# Patient Record
Sex: MALE | Race: WHITE | HISPANIC OR LATINO | URBAN - METROPOLITAN AREA
[De-identification: names, ages, dates, MRNs, and addresses within clinical notes are randomized per-mention and may not be internally consistent; named-entity substitution may affect disease eponyms.]

---

## 2019-12-18 ENCOUNTER — EMERGENCY (EMERGENCY)
Facility: HOSPITAL | Age: 34
LOS: 1 days | Discharge: HOME | End: 2019-12-18
Attending: INTERNAL MEDICINE | Admitting: INTERNAL MEDICINE
Payer: SELF-PAY

## 2019-12-18 VITALS
HEART RATE: 89 BPM | SYSTOLIC BLOOD PRESSURE: 139 MMHG | RESPIRATION RATE: 17 BRPM | DIASTOLIC BLOOD PRESSURE: 86 MMHG | TEMPERATURE: 99 F | OXYGEN SATURATION: 96 % | WEIGHT: 130.07 LBS | HEIGHT: 62 IN

## 2019-12-18 PROCEDURE — 99283 EMERGENCY DEPT VISIT LOW MDM: CPT

## 2019-12-18 RX ORDER — POLYMYXIN B SULF/TRIMETHOPRIM 10000-1/ML
1 DROPS OPHTHALMIC (EYE) ONCE
Refills: 0 | Status: COMPLETED | OUTPATIENT
Start: 2019-12-18 | End: 2019-12-18

## 2019-12-18 RX ADMIN — Medication 1 DROP(S): at 18:26

## 2019-12-18 NOTE — ED ADULT NURSE NOTE - CHIEF COMPLAINT QUOTE
Pt c/o right eye pain/redness, possible foreign body, vision test bilateral eyes 20/40, left eye 20/40, right eye 20/100

## 2019-12-18 NOTE — ED PROVIDER NOTE - ATTENDING CONTRIBUTION TO CARE
Pt c/o right eye pain/redness, possible foreign body, vision test bilateral eyes 20/40, left eye 20/40, right eye 20/100    pain, eye, possible foreign body  + Flourescin uptake R eye  dx corneal abrasion  Dr. García:  I have reviewed and discussed with the PA/ resident the case specifics, including the history, physical assessment, evaluation, conclusion, laboratory results, and medical plan. I agree with the contents, and conclusions. I have personally examined, and interviewed the patient.

## 2019-12-18 NOTE — ED PROVIDER NOTE - PATIENT PORTAL LINK FT
You can access the FollowMyHealth Patient Portal offered by [facility] by registering at the following website: [email or web address]. By joining [facility]’s FollowMyHealth portal, you will also be able to view your health information using other applications (apps) compatible with our system.

## 2019-12-18 NOTE — ED ADULT NURSE NOTE - OBJECTIVE STATEMENT
Pt arrived to the ER c/o right eye pain.  ID #[id number] used for assessment. 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 Pt denies any blurriness. Pt c/o pain in right eye 8/10. Pt presents with right eye redness.

## 2019-12-18 NOTE — ED PROVIDER NOTE - OBJECTIVE STATEMENT
34 year old male, no PMHx, presents to the ED complaining of right eye pain and foreign body sensation. Patient states he was taking off a vent cover, and when he did insulation fell into his right eye. The accident occurred around 0230, he washed it out but it continues to hurt, increased tearing, redness, and he feels like there is a foreign body present. He denies other trauma/injury or complaints.

## 2019-12-18 NOTE — ED ADULT TRIAGE NOTE - CHIEF COMPLAINT QUOTE
Pt c/o right eye pain/redness, possible foreign body Pt c/o right eye pain/redness, possible foreign body, vision test bilateral eyes 20/40, left eye 20/40, right eye 20/100

## 2019-12-18 NOTE — ED PROVIDER NOTE - NSFOLLOWUPINSTRUCTIONS_ED_ALL_ED_FT
Apply clean, warm, moist compresses to eye  Keep hands clean, do not rub the eye  Use Polytrim drops 1 drop in each eye every 4 hours for ten days  Take Motrin 600mg every 6-8 hours with food as needed  Follow up with ophthalmology: [phone]  Follow up with your PMD 2-3 days  Return to the ER for worsening    Corneal Abrasion    The cornea is the clear covering at the front and center of the eye. This very thin tissue is made up of many layers. If a scratch or injury causes the corneal epithelium to come off, it is called a corneal abrasion. Symptoms include eye pain, redness, tearing, difficulty keeping eye open, and light sensitivity. Do not drive or operate machinery if your eye is patched.  Antibiotic eye drops may be prescribed to reduce the risk of infection.  It is important to follow up with an ophthalmologist (eye doctor) to ensure proper healing.    SEEK IMMEDIATE MEDICAL CARE IF YOU HAVE ANY OF THE FOLLOWING SYMPTOMS: discharge from eyes, changes in vision, fever, or swelling.        Aplique compresas limpias, cálidas y húmedas en los ojos.  Mantenga las marco limpias, no se frote los ojos.  Use gotas de Polytrim 1 gota en cada mack cada 4 horas jesus hilary días  [place] Motrin 600mg cada 6-8 horas con alimentos según sea necesario  Seguimiento con oftalmología: [phone]  April un seguimiento con gomes PMD 2-3 días  Regrese a la latonya de emergencias para empeorar    Abrasión corneal    La córnea es la cubierta transparente en la parte frontal y central del mack. Tawnya tejido muy calabrese está formado por muchas capas. Si un rasguño o lesión causa que el epitelio corneal se desprenda, se llama abrasión corneal. Los síntomas incluyen dolor ocular, enrojecimiento, lagrimeo, dificultad para mantener el mack abierto y sensibilidad a la edson. No conduzca ni maneje maquinaria si gomes mack está parchado. Se pueden recetar gotas antibióticas para los ojos para reducir el riesgo de infección. Es importante hacer un seguimiento con un oftalmólogo (oftalmólogo) para garantizar oliver curación adecuada.    BUSQUE ATENCIÓN MÉDICA INMEDIATA SI TIENE ALGUNO DE LOS SÍNTOMAS SIGUIENTES: secreción de los ojos, cambios en la visión, fiebre o hinchazón.

## 2019-12-28 DIAGNOSIS — H57.10 OCULAR PAIN, UNSPECIFIED EYE: ICD-10-CM

## 2020-05-23 ENCOUNTER — EMERGENCY (EMERGENCY)
Facility: HOSPITAL | Age: 35
LOS: 1 days | Discharge: HOME | End: 2020-05-23
Attending: EMERGENCY MEDICINE | Admitting: EMERGENCY MEDICINE
Payer: SELF-PAY

## 2020-05-23 VITALS — WEIGHT: 160.06 LBS | HEIGHT: 65 IN

## 2020-05-23 DIAGNOSIS — F10.129 ALCOHOL ABUSE WITH INTOXICATION, UNSPECIFIED: ICD-10-CM

## 2020-05-23 PROCEDURE — 99284 EMERGENCY DEPT VISIT MOD MDM: CPT

## 2020-05-23 RX ORDER — HALOPERIDOL DECANOATE 100 MG/ML
5 INJECTION INTRAMUSCULAR ONCE
Refills: 0 | Status: COMPLETED | OUTPATIENT
Start: 2020-05-23 | End: 2020-05-23

## 2020-05-23 RX ORDER — SODIUM CHLORIDE 9 MG/ML
1000 INJECTION INTRAMUSCULAR; INTRAVENOUS; SUBCUTANEOUS ONCE
Refills: 0 | Status: COMPLETED | OUTPATIENT
Start: 2020-05-23 | End: 2020-05-23

## 2020-05-23 RX ADMIN — SODIUM CHLORIDE 1000 MILLILITER(S): 9 INJECTION INTRAMUSCULAR; INTRAVENOUS; SUBCUTANEOUS at 22:53

## 2020-05-23 RX ADMIN — Medication 1 MILLIGRAM(S): at 20:55

## 2020-05-23 RX ADMIN — HALOPERIDOL DECANOATE 5 MILLIGRAM(S): 100 INJECTION INTRAMUSCULAR at 20:40

## 2020-05-23 RX ADMIN — Medication 2 MILLIGRAM(S): at 20:40

## 2020-05-23 NOTE — ED ADULT NURSE REASSESSMENT NOTE - NS ED NURSE REASSESS COMMENT FT1
patient too aggressive to take vital signs, security called to assist patient into room and keep a safe environment. medicated with ativan 2 and haldol 5 IM per verbal order SHOAIB NGUYEN

## 2020-05-23 NOTE — ED PROVIDER NOTE - OBJECTIVE STATEMENT
34 y/o 33 y/o M with unknown PMH was BIB EMS for public intoxication, patient found on the streets. Patient agitated on arrival. Unable to obtained further information

## 2020-05-23 NOTE — ED PROVIDER NOTE - PATIENT PORTAL LINK FT
You can access the FollowMyHealth Patient Portal offered by [facility] by registering at the following website: [email or web address]. By joining [facility]’s FollowMyHealth portal, you will also be able to view your health information using other applications (apps) compatible with our system.

## 2020-05-23 NOTE — ED PROVIDER NOTE - PHYSICAL EXAMINATION
PHYSICAL EXAM:   · CONSTITUTIONAL: Well appearing, well nourished, and in no apparent distress.  · ENMT: Airway patent, Nasal mucosa clear. Mouth with normal mucosa. Throat has no vesicles, no oropharyngeal exudates and uvula is midline.  · EYES: Clear bilaterally, pupils equal, round and reactive to light.  · CARDIAC: Normal rate, regular rhythm.  Heart sounds S1, S2.  No murmurs, rubs or gallops.  · RESPIRATORY: Breath sounds clear and equal bilaterally.  · GASTROINTESTINAL: Abdomen soft, non-tender, no guarding.  · MUSCULOSKELETAL: no injury  · NEUROLOGICAL: responds to verbal stimuli, moves all four PHYSICAL EXAM:   · CONSTITUTIONAL: Well appearing, well nourished, and in no apparent distress. +AOB  · ENMT: Airway patent, Nasal mucosa clear. Mouth with normal mucosa. Throat has no vesicles, no oropharyngeal exudates and uvula is midline.  · EYES: Clear bilaterally, pupils equal, round and reactive to light.  · CARDIAC: Normal rate, regular rhythm.  Heart sounds S1, S2.  No murmurs, rubs or gallops.  · RESPIRATORY: Breath sounds clear and equal bilaterally.  · GASTROINTESTINAL: Abdomen soft, non-tender, no guarding.  · MUSCULOSKELETAL: no injury  · NEUROLOGICAL: responds to verbal stimuli, moves all four

## 2020-05-23 NOTE — ED ADULT NURSE NOTE - NSIMPLEMENTINTERV_GEN_ALL_ED
Implemented All Fall Risk Interventions:  [place] to call system. Call bell, personal items and telephone within reach. Instruct patient to call for assistance. Room bathroom lighting operational. Non-slip footwear when patient is off stretcher. Physically safe environment: no spills, clutter or unnecessary equipment. Stretcher in lowest position, wheels locked, appropriate side rails in place. Provide visual cue, wrist band, yellow gown, etc. Monitor gait and stability. Monitor for mental status changes and reorient to person, place, and time. Review medications for side effects contributing to fall risk. Reinforce activity limits and safety measures with patient and family.

## 2020-05-23 NOTE — ED PROVIDER NOTE - CLINICAL SUMMARY MEDICAL DECISION MAKING FREE TEXT BOX
33 y/o M with unknown PMH was BIB EMS for public intoxication, patient found on the streets. Patient agitated on arrival. Unable to obtained further information. PE as noted above.  No signs of trauma. patient agitated and aggressive towards staff, ativan and haldol given for sedation. he was placed on the monitor. Will continue to observe and monitor until sober

## 2020-05-23 NOTE — ED ADULT NURSE REASSESSMENT NOTE - NS ED NURSE REASSESS COMMENT FT1
Pt is in bed sleeping at this time. No agitation noted. Respirations easy and unlabored. Vital signs stable. Bed alarm in place including all fall precautions. Will continue to monitor.

## 2020-05-23 NOTE — ED ADULT NURSE NOTE - OBJECTIVE STATEMENT
Pt brought in by ems for alcohol intox. On arrival pt was agitated and combative. No injuries noted. Pt stated he drank liquor but denies any drug use.

## 2020-05-23 NOTE — ED ADULT NURSE NOTE - PAIN RATING/NUMBER SCALE (0-10): ACTIVITY
Left message for patient she has to come in and sign the form and we give her a copy and a bracelet.     
Pt wants to obtain a sign stating \"do not resuscitate\". She wants something saying this so if something happens the medical staff are aware of her wishes.     
0

## 2020-05-24 VITALS
OXYGEN SATURATION: 99 % | DIASTOLIC BLOOD PRESSURE: 76 MMHG | HEART RATE: 95 BPM | SYSTOLIC BLOOD PRESSURE: 125 MMHG | RESPIRATION RATE: 17 BRPM | TEMPERATURE: 98 F

## 2022-01-01 ENCOUNTER — APPOINTMENT (OUTPATIENT)
Dept: ULTRASOUND IMAGING | Facility: HOSPITAL | Age: 37
End: 2022-01-01

## 2022-01-01 ENCOUNTER — APPOINTMENT (OUTPATIENT)
Dept: FAMILY MEDICINE | Facility: HOSPITAL | Age: 37
End: 2022-01-01

## 2022-01-01 ENCOUNTER — EMERGENCY (EMERGENCY)
Facility: HOSPITAL | Age: 37
LOS: 1 days | Discharge: LEFT BEFORE DISCHARGE | End: 2022-01-01
Admitting: EMERGENCY MEDICINE

## 2022-01-01 VITALS
HEART RATE: 88 BPM | SYSTOLIC BLOOD PRESSURE: 133 MMHG | WEIGHT: 131.4 LBS | TEMPERATURE: 99 F | DIASTOLIC BLOOD PRESSURE: 90 MMHG | HEIGHT: 65 IN | OXYGEN SATURATION: 100 % | RESPIRATION RATE: 18 BRPM

## 2022-01-01 PROCEDURE — L9991: CPT

## 2022-09-06 NOTE — ED ADULT TRIAGE NOTE - CHIEF COMPLAINT QUOTE
Pt c/o dry mouth with tremors/blurry vision started 2 days ago Pt c/o dry mouth with tremors/blurry vision started 2 days ago, refused to do vision test

## 2022-09-24 PROBLEM — Z00.00 ENCOUNTER FOR PREVENTIVE HEALTH EXAMINATION: Status: ACTIVE | Noted: 2022-01-01

## 2023-01-01 ENCOUNTER — INPATIENT (INPATIENT)
Facility: HOSPITAL | Age: 38
LOS: 9 days | DRG: 643 | End: 2023-01-13
Attending: STUDENT IN AN ORGANIZED HEALTH CARE EDUCATION/TRAINING PROGRAM | Admitting: INTERNAL MEDICINE
Payer: MEDICAID

## 2023-01-01 VITALS
HEART RATE: 131 BPM | WEIGHT: 113.98 LBS | TEMPERATURE: 99 F | OXYGEN SATURATION: 97 % | HEIGHT: 63 IN | RESPIRATION RATE: 18 BRPM | DIASTOLIC BLOOD PRESSURE: 97 MMHG | SYSTOLIC BLOOD PRESSURE: 137 MMHG

## 2023-01-01 VITALS — RESPIRATION RATE: 18 BRPM | HEART RATE: 150 BPM | OXYGEN SATURATION: 92 %

## 2023-01-01 DIAGNOSIS — C22.9 MALIGNANT NEOPLASM OF LIVER, NOT SPECIFIED AS PRIMARY OR SECONDARY: ICD-10-CM

## 2023-01-01 DIAGNOSIS — K76.82 HEPATIC ENCEPHALOPATHY: ICD-10-CM

## 2023-01-01 DIAGNOSIS — E16.2 HYPOGLYCEMIA, UNSPECIFIED: ICD-10-CM

## 2023-01-01 DIAGNOSIS — U07.1 COVID-19: ICD-10-CM

## 2023-01-01 DIAGNOSIS — Z71.89 OTHER SPECIFIED COUNSELING: ICD-10-CM

## 2023-01-01 LAB
A1C WITH ESTIMATED AVERAGE GLUCOSE RESULT: 4.7 % — SIGNIFICANT CHANGE UP (ref 4–5.6)
ALBUMIN SERPL ELPH-MCNC: 2.1 G/DL — LOW (ref 3.3–5)
ALBUMIN SERPL ELPH-MCNC: 2.2 G/DL — LOW (ref 3.3–5)
ALBUMIN SERPL ELPH-MCNC: 2.2 G/DL — LOW (ref 3.3–5)
ALBUMIN SERPL ELPH-MCNC: 2.3 G/DL — LOW (ref 3.3–5)
ALBUMIN SERPL ELPH-MCNC: 2.3 G/DL — LOW (ref 3.3–5)
ALP SERPL-CCNC: 199 U/L — HIGH (ref 40–120)
ALP SERPL-CCNC: 206 U/L — HIGH (ref 40–120)
ALP SERPL-CCNC: 207 U/L — HIGH (ref 40–120)
ALP SERPL-CCNC: 245 U/L — HIGH (ref 40–120)
ALP SERPL-CCNC: 245 U/L — HIGH (ref 40–120)
ALT FLD-CCNC: 123 U/L — HIGH (ref 10–45)
ALT FLD-CCNC: 126 U/L — HIGH (ref 10–45)
ALT FLD-CCNC: 78 U/L — HIGH (ref 10–45)
ALT FLD-CCNC: 79 U/L — HIGH (ref 10–45)
ALT FLD-CCNC: 84 U/L — HIGH (ref 10–45)
AMMONIA BLD-MCNC: 136 UMOL/L — HIGH (ref 11–55)
AMMONIA BLD-MCNC: 147 UMOL/L — HIGH (ref 11–55)
ANION GAP SERPL CALC-SCNC: 10 MMOL/L — SIGNIFICANT CHANGE UP (ref 5–17)
ANION GAP SERPL CALC-SCNC: 10 MMOL/L — SIGNIFICANT CHANGE UP (ref 5–17)
ANION GAP SERPL CALC-SCNC: 11 MMOL/L — SIGNIFICANT CHANGE UP (ref 5–17)
ANION GAP SERPL CALC-SCNC: 12 MMOL/L — SIGNIFICANT CHANGE UP (ref 5–17)
ANION GAP SERPL CALC-SCNC: 13 MMOL/L — SIGNIFICANT CHANGE UP (ref 5–17)
ANION GAP SERPL CALC-SCNC: 8 MMOL/L — SIGNIFICANT CHANGE UP (ref 5–17)
ANION GAP SERPL CALC-SCNC: 8 MMOL/L — SIGNIFICANT CHANGE UP (ref 5–17)
ANION GAP SERPL CALC-SCNC: 9 MMOL/L — SIGNIFICANT CHANGE UP (ref 5–17)
ANION GAP SERPL CALC-SCNC: 9 MMOL/L — SIGNIFICANT CHANGE UP (ref 5–17)
ANISOCYTOSIS BLD QL: SLIGHT — SIGNIFICANT CHANGE UP
APPEARANCE UR: CLEAR — SIGNIFICANT CHANGE UP
APTT BLD: 40.4 SEC — HIGH (ref 27.5–35.5)
AST SERPL-CCNC: 106 U/L — HIGH (ref 10–40)
AST SERPL-CCNC: 78 U/L — HIGH (ref 10–40)
AST SERPL-CCNC: 94 U/L — HIGH (ref 10–40)
AST SERPL-CCNC: 96 U/L — HIGH (ref 10–40)
AST SERPL-CCNC: 99 U/L — HIGH (ref 10–40)
B PERT IGG+IGM PNL SER: ABNORMAL
BACTERIA # UR AUTO: ABNORMAL /HPF
BASOPHILS # BLD AUTO: 0 K/UL — SIGNIFICANT CHANGE UP (ref 0–0.2)
BASOPHILS # BLD AUTO: 0.01 K/UL — SIGNIFICANT CHANGE UP (ref 0–0.2)
BASOPHILS # BLD AUTO: 0.01 K/UL — SIGNIFICANT CHANGE UP (ref 0–0.2)
BASOPHILS # BLD AUTO: 0.03 K/UL — SIGNIFICANT CHANGE UP (ref 0–0.2)
BASOPHILS # BLD AUTO: 0.03 K/UL — SIGNIFICANT CHANGE UP (ref 0–0.2)
BASOPHILS # BLD AUTO: 0.04 K/UL — SIGNIFICANT CHANGE UP (ref 0–0.2)
BASOPHILS NFR BLD AUTO: 0 % — SIGNIFICANT CHANGE UP (ref 0–2)
BASOPHILS NFR BLD AUTO: 0.1 % — SIGNIFICANT CHANGE UP (ref 0–2)
BASOPHILS NFR BLD AUTO: 0.1 % — SIGNIFICANT CHANGE UP (ref 0–2)
BASOPHILS NFR BLD AUTO: 0.2 % — SIGNIFICANT CHANGE UP (ref 0–2)
BILIRUB SERPL-MCNC: 2 MG/DL — HIGH (ref 0.2–1.2)
BILIRUB SERPL-MCNC: 2.1 MG/DL — HIGH (ref 0.2–1.2)
BILIRUB SERPL-MCNC: 2.3 MG/DL — HIGH (ref 0.2–1.2)
BILIRUB SERPL-MCNC: 2.5 MG/DL — HIGH (ref 0.2–1.2)
BILIRUB SERPL-MCNC: 2.5 MG/DL — HIGH (ref 0.2–1.2)
BILIRUB SERPL-MCNC: 3.3 MG/DL — HIGH (ref 0.2–1.2)
BILIRUB UR-MCNC: NEGATIVE — SIGNIFICANT CHANGE UP
BLD GP AB SCN SERPL QL: SIGNIFICANT CHANGE UP
BUN SERPL-MCNC: 10 MG/DL — SIGNIFICANT CHANGE UP (ref 7–23)
BUN SERPL-MCNC: 11 MG/DL — SIGNIFICANT CHANGE UP (ref 7–23)
BUN SERPL-MCNC: 11 MG/DL — SIGNIFICANT CHANGE UP (ref 7–23)
BUN SERPL-MCNC: 13 MG/DL — SIGNIFICANT CHANGE UP (ref 7–23)
BUN SERPL-MCNC: 15 MG/DL — SIGNIFICANT CHANGE UP (ref 7–23)
BUN SERPL-MCNC: 15 MG/DL — SIGNIFICANT CHANGE UP (ref 7–23)
BUN SERPL-MCNC: 8 MG/DL — SIGNIFICANT CHANGE UP (ref 7–23)
CALCIUM SERPL-MCNC: 8.2 MG/DL — LOW (ref 8.4–10.5)
CALCIUM SERPL-MCNC: 8.3 MG/DL — LOW (ref 8.4–10.5)
CALCIUM SERPL-MCNC: 8.4 MG/DL — SIGNIFICANT CHANGE UP (ref 8.4–10.5)
CALCIUM SERPL-MCNC: 8.5 MG/DL — SIGNIFICANT CHANGE UP (ref 8.4–10.5)
CALCIUM SERPL-MCNC: 8.6 MG/DL — SIGNIFICANT CHANGE UP (ref 8.4–10.5)
CALCIUM SERPL-MCNC: 8.6 MG/DL — SIGNIFICANT CHANGE UP (ref 8.4–10.5)
CALCIUM SERPL-MCNC: 8.8 MG/DL — SIGNIFICANT CHANGE UP (ref 8.4–10.5)
CALCIUM SERPL-MCNC: 8.8 MG/DL — SIGNIFICANT CHANGE UP (ref 8.4–10.5)
CALCIUM SERPL-MCNC: 8.9 MG/DL — SIGNIFICANT CHANGE UP (ref 8.4–10.5)
CHLORIDE SERPL-SCNC: 101 MMOL/L — SIGNIFICANT CHANGE UP (ref 96–108)
CHLORIDE SERPL-SCNC: 101 MMOL/L — SIGNIFICANT CHANGE UP (ref 96–108)
CHLORIDE SERPL-SCNC: 104 MMOL/L — SIGNIFICANT CHANGE UP (ref 96–108)
CHLORIDE SERPL-SCNC: 95 MMOL/L — LOW (ref 96–108)
CHLORIDE SERPL-SCNC: 98 MMOL/L — SIGNIFICANT CHANGE UP (ref 96–108)
CHLORIDE SERPL-SCNC: 99 MMOL/L — SIGNIFICANT CHANGE UP (ref 96–108)
CO2 SERPL-SCNC: 20 MMOL/L — LOW (ref 22–31)
CO2 SERPL-SCNC: 22 MMOL/L — SIGNIFICANT CHANGE UP (ref 22–31)
CO2 SERPL-SCNC: 23 MMOL/L — SIGNIFICANT CHANGE UP (ref 22–31)
CO2 SERPL-SCNC: 25 MMOL/L — SIGNIFICANT CHANGE UP (ref 22–31)
COLOR FLD: YELLOW — SIGNIFICANT CHANGE UP
COLOR SPEC: YELLOW — SIGNIFICANT CHANGE UP
COMMENT - FLUIDS: SIGNIFICANT CHANGE UP
CORTICOSTEROID BINDING GLOBULIN RESULT: 1.4 MG/DL — LOW
CORTIS F/TOTAL MFR SERPL: <4 % — SIGNIFICANT CHANGE UP
CORTIS SERPL-MCNC: <1 UG/DL — LOW
CORTISOL, FREE RESULT: <0.04 UG/DL — LOW
CREAT SERPL-MCNC: 0.4 MG/DL — LOW (ref 0.5–1.3)
CREAT SERPL-MCNC: 0.4 MG/DL — LOW (ref 0.5–1.3)
CREAT SERPL-MCNC: 0.49 MG/DL — LOW (ref 0.5–1.3)
CREAT SERPL-MCNC: 0.51 MG/DL — SIGNIFICANT CHANGE UP (ref 0.5–1.3)
CREAT SERPL-MCNC: 0.54 MG/DL — SIGNIFICANT CHANGE UP (ref 0.5–1.3)
CREAT SERPL-MCNC: 0.55 MG/DL — SIGNIFICANT CHANGE UP (ref 0.5–1.3)
CREAT SERPL-MCNC: 0.56 MG/DL — SIGNIFICANT CHANGE UP (ref 0.5–1.3)
CREAT SERPL-MCNC: 0.57 MG/DL — SIGNIFICANT CHANGE UP (ref 0.5–1.3)
CREAT SERPL-MCNC: 0.6 MG/DL — SIGNIFICANT CHANGE UP (ref 0.5–1.3)
CREAT SERPL-MCNC: 0.76 MG/DL — SIGNIFICANT CHANGE UP (ref 0.5–1.3)
CULTURE RESULTS: NO GROWTH — SIGNIFICANT CHANGE UP
CULTURE RESULTS: SIGNIFICANT CHANGE UP
CULTURE RESULTS: SIGNIFICANT CHANGE UP
DACRYOCYTES BLD QL SMEAR: SLIGHT — SIGNIFICANT CHANGE UP
DIFF PNL FLD: ABNORMAL
EGFR: 118 ML/MIN/1.73M2 — SIGNIFICANT CHANGE UP
EGFR: 127 ML/MIN/1.73M2 — SIGNIFICANT CHANGE UP
EGFR: 128 ML/MIN/1.73M2 — SIGNIFICANT CHANGE UP
EGFR: 129 ML/MIN/1.73M2 — SIGNIFICANT CHANGE UP
EGFR: 130 ML/MIN/1.73M2 — SIGNIFICANT CHANGE UP
EGFR: 130 ML/MIN/1.73M2 — SIGNIFICANT CHANGE UP
EGFR: 133 ML/MIN/1.73M2 — SIGNIFICANT CHANGE UP
EGFR: 134 ML/MIN/1.73M2 — SIGNIFICANT CHANGE UP
EGFR: 143 ML/MIN/1.73M2 — SIGNIFICANT CHANGE UP
EGFR: 144 ML/MIN/1.73M2 — SIGNIFICANT CHANGE UP
ELLIPTOCYTES BLD QL SMEAR: SLIGHT — SIGNIFICANT CHANGE UP
EOSINOPHIL # BLD AUTO: 0 K/UL — SIGNIFICANT CHANGE UP (ref 0–0.5)
EOSINOPHIL # BLD AUTO: 0.01 K/UL — SIGNIFICANT CHANGE UP (ref 0–0.5)
EOSINOPHIL # BLD AUTO: 0.03 K/UL — SIGNIFICANT CHANGE UP (ref 0–0.5)
EOSINOPHIL NFR BLD AUTO: 0 % — SIGNIFICANT CHANGE UP (ref 0–6)
EOSINOPHIL NFR BLD AUTO: 0.1 % — SIGNIFICANT CHANGE UP (ref 0–6)
EOSINOPHIL NFR BLD AUTO: 0.2 % — SIGNIFICANT CHANGE UP (ref 0–6)
EPI CELLS # UR: SIGNIFICANT CHANGE UP
ESTIMATED AVERAGE GLUCOSE: 88 MG/DL — SIGNIFICANT CHANGE UP (ref 68–114)
FLUID INTAKE SUBSTANCE CLASS: SIGNIFICANT CHANGE UP
GLUCOSE BLDC GLUCOMTR-MCNC: 100 MG/DL — HIGH (ref 70–99)
GLUCOSE BLDC GLUCOMTR-MCNC: 100 MG/DL — HIGH (ref 70–99)
GLUCOSE BLDC GLUCOMTR-MCNC: 101 MG/DL — HIGH (ref 70–99)
GLUCOSE BLDC GLUCOMTR-MCNC: 101 MG/DL — HIGH (ref 70–99)
GLUCOSE BLDC GLUCOMTR-MCNC: 102 MG/DL — HIGH (ref 70–99)
GLUCOSE BLDC GLUCOMTR-MCNC: 106 MG/DL — HIGH (ref 70–99)
GLUCOSE BLDC GLUCOMTR-MCNC: 106 MG/DL — HIGH (ref 70–99)
GLUCOSE BLDC GLUCOMTR-MCNC: 108 MG/DL — HIGH (ref 70–99)
GLUCOSE BLDC GLUCOMTR-MCNC: 109 MG/DL — HIGH (ref 70–99)
GLUCOSE BLDC GLUCOMTR-MCNC: 111 MG/DL — HIGH (ref 70–99)
GLUCOSE BLDC GLUCOMTR-MCNC: 112 MG/DL — HIGH (ref 70–99)
GLUCOSE BLDC GLUCOMTR-MCNC: 113 MG/DL — HIGH (ref 70–99)
GLUCOSE BLDC GLUCOMTR-MCNC: 115 MG/DL — HIGH (ref 70–99)
GLUCOSE BLDC GLUCOMTR-MCNC: 117 MG/DL — HIGH (ref 70–99)
GLUCOSE BLDC GLUCOMTR-MCNC: 118 MG/DL — HIGH (ref 70–99)
GLUCOSE BLDC GLUCOMTR-MCNC: 119 MG/DL — HIGH (ref 70–99)
GLUCOSE BLDC GLUCOMTR-MCNC: 119 MG/DL — HIGH (ref 70–99)
GLUCOSE BLDC GLUCOMTR-MCNC: 121 MG/DL — HIGH (ref 70–99)
GLUCOSE BLDC GLUCOMTR-MCNC: 129 MG/DL — HIGH (ref 70–99)
GLUCOSE BLDC GLUCOMTR-MCNC: 134 MG/DL — HIGH (ref 70–99)
GLUCOSE BLDC GLUCOMTR-MCNC: 135 MG/DL — HIGH (ref 70–99)
GLUCOSE BLDC GLUCOMTR-MCNC: 138 MG/DL — HIGH (ref 70–99)
GLUCOSE BLDC GLUCOMTR-MCNC: 143 MG/DL — HIGH (ref 70–99)
GLUCOSE BLDC GLUCOMTR-MCNC: 160 MG/DL — HIGH (ref 70–99)
GLUCOSE BLDC GLUCOMTR-MCNC: 188 MG/DL — HIGH (ref 70–99)
GLUCOSE BLDC GLUCOMTR-MCNC: 287 MG/DL — HIGH (ref 70–99)
GLUCOSE BLDC GLUCOMTR-MCNC: 304 MG/DL — HIGH (ref 70–99)
GLUCOSE BLDC GLUCOMTR-MCNC: 38 MG/DL — CRITICAL LOW (ref 70–99)
GLUCOSE BLDC GLUCOMTR-MCNC: 39 MG/DL — CRITICAL LOW (ref 70–99)
GLUCOSE BLDC GLUCOMTR-MCNC: 43 MG/DL — CRITICAL LOW (ref 70–99)
GLUCOSE BLDC GLUCOMTR-MCNC: 45 MG/DL — CRITICAL LOW (ref 70–99)
GLUCOSE BLDC GLUCOMTR-MCNC: 45 MG/DL — CRITICAL LOW (ref 70–99)
GLUCOSE BLDC GLUCOMTR-MCNC: 56 MG/DL — LOW (ref 70–99)
GLUCOSE BLDC GLUCOMTR-MCNC: 61 MG/DL — LOW (ref 70–99)
GLUCOSE BLDC GLUCOMTR-MCNC: 62 MG/DL — LOW (ref 70–99)
GLUCOSE BLDC GLUCOMTR-MCNC: 64 MG/DL — LOW (ref 70–99)
GLUCOSE BLDC GLUCOMTR-MCNC: 65 MG/DL — LOW (ref 70–99)
GLUCOSE BLDC GLUCOMTR-MCNC: 67 MG/DL — LOW (ref 70–99)
GLUCOSE BLDC GLUCOMTR-MCNC: 68 MG/DL — LOW (ref 70–99)
GLUCOSE BLDC GLUCOMTR-MCNC: 69 MG/DL — LOW (ref 70–99)
GLUCOSE BLDC GLUCOMTR-MCNC: 77 MG/DL — SIGNIFICANT CHANGE UP (ref 70–99)
GLUCOSE BLDC GLUCOMTR-MCNC: 80 MG/DL — SIGNIFICANT CHANGE UP (ref 70–99)
GLUCOSE BLDC GLUCOMTR-MCNC: 83 MG/DL — SIGNIFICANT CHANGE UP (ref 70–99)
GLUCOSE BLDC GLUCOMTR-MCNC: 84 MG/DL — SIGNIFICANT CHANGE UP (ref 70–99)
GLUCOSE BLDC GLUCOMTR-MCNC: 89 MG/DL — SIGNIFICANT CHANGE UP (ref 70–99)
GLUCOSE BLDC GLUCOMTR-MCNC: 89 MG/DL — SIGNIFICANT CHANGE UP (ref 70–99)
GLUCOSE BLDC GLUCOMTR-MCNC: 90 MG/DL — SIGNIFICANT CHANGE UP (ref 70–99)
GLUCOSE BLDC GLUCOMTR-MCNC: 91 MG/DL — SIGNIFICANT CHANGE UP (ref 70–99)
GLUCOSE BLDC GLUCOMTR-MCNC: 91 MG/DL — SIGNIFICANT CHANGE UP (ref 70–99)
GLUCOSE BLDC GLUCOMTR-MCNC: 92 MG/DL — SIGNIFICANT CHANGE UP (ref 70–99)
GLUCOSE BLDC GLUCOMTR-MCNC: 93 MG/DL — SIGNIFICANT CHANGE UP (ref 70–99)
GLUCOSE BLDC GLUCOMTR-MCNC: 93 MG/DL — SIGNIFICANT CHANGE UP (ref 70–99)
GLUCOSE BLDC GLUCOMTR-MCNC: 94 MG/DL — SIGNIFICANT CHANGE UP (ref 70–99)
GLUCOSE SERPL-MCNC: 104 MG/DL — HIGH (ref 70–99)
GLUCOSE SERPL-MCNC: 109 MG/DL — HIGH (ref 70–99)
GLUCOSE SERPL-MCNC: 117 MG/DL — HIGH (ref 70–99)
GLUCOSE SERPL-MCNC: 118 MG/DL — HIGH (ref 70–99)
GLUCOSE SERPL-MCNC: 118 MG/DL — HIGH (ref 70–99)
GLUCOSE SERPL-MCNC: 175 MG/DL — HIGH (ref 70–99)
GLUCOSE SERPL-MCNC: 241 MG/DL — HIGH (ref 70–99)
GLUCOSE SERPL-MCNC: 250 MG/DL — HIGH (ref 70–99)
GLUCOSE SERPL-MCNC: 86 MG/DL — SIGNIFICANT CHANGE UP (ref 70–99)
GLUCOSE UR QL: 50 MG/DL
HCT VFR BLD CALC: 38.7 % — LOW (ref 39–50)
HCT VFR BLD CALC: 39 % — SIGNIFICANT CHANGE UP (ref 39–50)
HCT VFR BLD CALC: 39.6 % — SIGNIFICANT CHANGE UP (ref 39–50)
HCT VFR BLD CALC: 39.8 % — SIGNIFICANT CHANGE UP (ref 39–50)
HCT VFR BLD CALC: 40.7 % — SIGNIFICANT CHANGE UP (ref 39–50)
HCT VFR BLD CALC: 41.2 % — SIGNIFICANT CHANGE UP (ref 39–50)
HCT VFR BLD CALC: 41.3 % — SIGNIFICANT CHANGE UP (ref 39–50)
HCT VFR BLD CALC: 42.4 % — SIGNIFICANT CHANGE UP (ref 39–50)
HGB BLD-MCNC: 12.6 G/DL — LOW (ref 13–17)
HGB BLD-MCNC: 12.8 G/DL — LOW (ref 13–17)
HGB BLD-MCNC: 13.1 G/DL — SIGNIFICANT CHANGE UP (ref 13–17)
HGB BLD-MCNC: 13.1 G/DL — SIGNIFICANT CHANGE UP (ref 13–17)
HGB BLD-MCNC: 13.2 G/DL — SIGNIFICANT CHANGE UP (ref 13–17)
HGB BLD-MCNC: 13.5 G/DL — SIGNIFICANT CHANGE UP (ref 13–17)
HGB BLD-MCNC: 13.6 G/DL — SIGNIFICANT CHANGE UP (ref 13–17)
HGB BLD-MCNC: 13.8 G/DL — SIGNIFICANT CHANGE UP (ref 13–17)
IMM GRANULOCYTES NFR BLD AUTO: 0.6 % — SIGNIFICANT CHANGE UP (ref 0–0.9)
IMM GRANULOCYTES NFR BLD AUTO: 0.6 % — SIGNIFICANT CHANGE UP (ref 0–0.9)
IMM GRANULOCYTES NFR BLD AUTO: 0.7 % — SIGNIFICANT CHANGE UP (ref 0–0.9)
IMM GRANULOCYTES NFR BLD AUTO: 0.8 % — SIGNIFICANT CHANGE UP (ref 0–0.9)
IMM GRANULOCYTES NFR BLD AUTO: 1.1 % — HIGH (ref 0–0.9)
INR BLD: 2.44 RATIO — HIGH (ref 0.88–1.16)
INR BLD: 2.49 RATIO — HIGH (ref 0.88–1.16)
INR BLD: 2.71 RATIO — HIGH (ref 0.88–1.16)
KETONES UR-MCNC: ABNORMAL
LACTATE SERPL-SCNC: 2.5 MMOL/L — HIGH (ref 0.7–2)
LACTATE SERPL-SCNC: 4.5 MMOL/L — CRITICAL HIGH (ref 0.7–2)
LACTATE SERPL-SCNC: 4.5 MMOL/L — CRITICAL HIGH (ref 0.7–2)
LACTATE SERPL-SCNC: 5 MMOL/L — CRITICAL HIGH (ref 0.7–2)
LACTATE SERPL-SCNC: 5.1 MMOL/L — CRITICAL HIGH (ref 0.7–2)
LACTATE SERPL-SCNC: 5.1 MMOL/L — CRITICAL HIGH (ref 0.7–2)
LACTATE SERPL-SCNC: 5.6 MMOL/L — CRITICAL HIGH (ref 0.7–2)
LEUKOCYTE ESTERASE UR-ACNC: ABNORMAL
LG PLATELETS BLD QL AUTO: SLIGHT — SIGNIFICANT CHANGE UP
LIDOCAIN IGE QN: 23 U/L — LOW (ref 73–393)
LYMPHOCYTES # BLD AUTO: 0.51 K/UL — LOW (ref 1–3.3)
LYMPHOCYTES # BLD AUTO: 0.94 K/UL — LOW (ref 1–3.3)
LYMPHOCYTES # BLD AUTO: 1.1 K/UL — SIGNIFICANT CHANGE UP (ref 1–3.3)
LYMPHOCYTES # BLD AUTO: 1.5 K/UL — SIGNIFICANT CHANGE UP (ref 1–3.3)
LYMPHOCYTES # BLD AUTO: 1.63 K/UL — SIGNIFICANT CHANGE UP (ref 1–3.3)
LYMPHOCYTES # BLD AUTO: 1.69 K/UL — SIGNIFICANT CHANGE UP (ref 1–3.3)
LYMPHOCYTES # BLD AUTO: 11.6 % — LOW (ref 13–44)
LYMPHOCYTES # BLD AUTO: 4 % — LOW (ref 13–44)
LYMPHOCYTES # BLD AUTO: 6.7 % — LOW (ref 13–44)
LYMPHOCYTES # BLD AUTO: 7.9 % — LOW (ref 13–44)
LYMPHOCYTES # BLD AUTO: 8.9 % — LOW (ref 13–44)
LYMPHOCYTES # BLD AUTO: 9.1 % — LOW (ref 13–44)
LYMPHOCYTES # FLD: 14 % — SIGNIFICANT CHANGE UP
MAGNESIUM SERPL-MCNC: 1.7 MG/DL — SIGNIFICANT CHANGE UP (ref 1.6–2.6)
MANUAL SMEAR VERIFICATION: SIGNIFICANT CHANGE UP
MCHC RBC-ENTMCNC: 29.1 PG — SIGNIFICANT CHANGE UP (ref 27–34)
MCHC RBC-ENTMCNC: 29.1 PG — SIGNIFICANT CHANGE UP (ref 27–34)
MCHC RBC-ENTMCNC: 29.2 PG — SIGNIFICANT CHANGE UP (ref 27–34)
MCHC RBC-ENTMCNC: 29.2 PG — SIGNIFICANT CHANGE UP (ref 27–34)
MCHC RBC-ENTMCNC: 29.3 PG — SIGNIFICANT CHANGE UP (ref 27–34)
MCHC RBC-ENTMCNC: 29.8 PG — SIGNIFICANT CHANGE UP (ref 27–34)
MCHC RBC-ENTMCNC: 30 PG — SIGNIFICANT CHANGE UP (ref 27–34)
MCHC RBC-ENTMCNC: 30.2 PG — SIGNIFICANT CHANGE UP (ref 27–34)
MCHC RBC-ENTMCNC: 32.2 GM/DL — SIGNIFICANT CHANGE UP (ref 32–36)
MCHC RBC-ENTMCNC: 32.3 GM/DL — SIGNIFICANT CHANGE UP (ref 32–36)
MCHC RBC-ENTMCNC: 32.5 GM/DL — SIGNIFICANT CHANGE UP (ref 32–36)
MCHC RBC-ENTMCNC: 32.7 GM/DL — SIGNIFICANT CHANGE UP (ref 32–36)
MCHC RBC-ENTMCNC: 33 GM/DL — SIGNIFICANT CHANGE UP (ref 32–36)
MCHC RBC-ENTMCNC: 33.1 GM/DL — SIGNIFICANT CHANGE UP (ref 32–36)
MCHC RBC-ENTMCNC: 33.1 GM/DL — SIGNIFICANT CHANGE UP (ref 32–36)
MCHC RBC-ENTMCNC: 33.2 GM/DL — SIGNIFICANT CHANGE UP (ref 32–36)
MCV RBC AUTO: 88.2 FL — SIGNIFICANT CHANGE UP (ref 80–100)
MCV RBC AUTO: 88.8 FL — SIGNIFICANT CHANGE UP (ref 80–100)
MCV RBC AUTO: 89 FL — SIGNIFICANT CHANGE UP (ref 80–100)
MCV RBC AUTO: 90.1 FL — SIGNIFICANT CHANGE UP (ref 80–100)
MCV RBC AUTO: 90.2 FL — SIGNIFICANT CHANGE UP (ref 80–100)
MCV RBC AUTO: 90.8 FL — SIGNIFICANT CHANGE UP (ref 80–100)
MCV RBC AUTO: 91.1 FL — SIGNIFICANT CHANGE UP (ref 80–100)
MCV RBC AUTO: 92.2 FL — SIGNIFICANT CHANGE UP (ref 80–100)
MELD SCORE WITH DIALYSIS: 35 POINTS — SIGNIFICANT CHANGE UP
MELD SCORE WITHOUT DIALYSIS: 23 POINTS — SIGNIFICANT CHANGE UP
MESOTHL CELL # FLD: 9 % — SIGNIFICANT CHANGE UP
MONOCYTES # BLD AUTO: 0.51 K/UL — SIGNIFICANT CHANGE UP (ref 0–0.9)
MONOCYTES # BLD AUTO: 0.51 K/UL — SIGNIFICANT CHANGE UP (ref 0–0.9)
MONOCYTES # BLD AUTO: 0.56 K/UL — SIGNIFICANT CHANGE UP (ref 0–0.9)
MONOCYTES # BLD AUTO: 0.97 K/UL — HIGH (ref 0–0.9)
MONOCYTES # BLD AUTO: 1.03 K/UL — HIGH (ref 0–0.9)
MONOCYTES # BLD AUTO: 1.34 K/UL — HIGH (ref 0–0.9)
MONOCYTES NFR BLD AUTO: 3.7 % — SIGNIFICANT CHANGE UP (ref 2–14)
MONOCYTES NFR BLD AUTO: 4 % — SIGNIFICANT CHANGE UP (ref 2–14)
MONOCYTES NFR BLD AUTO: 4 % — SIGNIFICANT CHANGE UP (ref 2–14)
MONOCYTES NFR BLD AUTO: 6.1 % — SIGNIFICANT CHANGE UP (ref 2–14)
MONOCYTES NFR BLD AUTO: 6.9 % — SIGNIFICANT CHANGE UP (ref 2–14)
MONOCYTES NFR BLD AUTO: 7.2 % — SIGNIFICANT CHANGE UP (ref 2–14)
MONOS+MACROS # FLD: 29 % — SIGNIFICANT CHANGE UP
NEUTROPHILS # BLD AUTO: 11.32 K/UL — HIGH (ref 1.8–7.4)
NEUTROPHILS # BLD AUTO: 11.68 K/UL — HIGH (ref 1.8–7.4)
NEUTROPHILS # BLD AUTO: 12.15 K/UL — HIGH (ref 1.8–7.4)
NEUTROPHILS # BLD AUTO: 12.38 K/UL — HIGH (ref 1.8–7.4)
NEUTROPHILS # BLD AUTO: 14.05 K/UL — HIGH (ref 1.8–7.4)
NEUTROPHILS # BLD AUTO: 15.33 K/UL — HIGH (ref 1.8–7.4)
NEUTROPHILS NFR BLD AUTO: 80.6 % — HIGH (ref 43–77)
NEUTROPHILS NFR BLD AUTO: 82.5 % — HIGH (ref 43–77)
NEUTROPHILS NFR BLD AUTO: 83.7 % — HIGH (ref 43–77)
NEUTROPHILS NFR BLD AUTO: 87.4 % — HIGH (ref 43–77)
NEUTROPHILS NFR BLD AUTO: 88.8 % — HIGH (ref 43–77)
NEUTROPHILS NFR BLD AUTO: 91 % — HIGH (ref 43–77)
NEUTROPHILS-BODY FLUID: 45 % — SIGNIFICANT CHANGE UP
NITRITE UR-MCNC: NEGATIVE — SIGNIFICANT CHANGE UP
NRBC # BLD: 0 /100 WBCS — SIGNIFICANT CHANGE UP (ref 0–0)
NRBC # BLD: 0 /100 — SIGNIFICANT CHANGE UP (ref 0–0)
OTHER CELLS FLD MANUAL: 3 % — SIGNIFICANT CHANGE UP
PH UR: 6.5 — SIGNIFICANT CHANGE UP (ref 5–8)
PHOSPHATE SERPL-MCNC: 2.9 MG/DL — SIGNIFICANT CHANGE UP (ref 2.5–4.5)
PLAT MORPH BLD: NORMAL — SIGNIFICANT CHANGE UP
PLATELET # BLD AUTO: 126 K/UL — LOW (ref 150–400)
PLATELET # BLD AUTO: 127 K/UL — LOW (ref 150–400)
PLATELET # BLD AUTO: 133 K/UL — LOW (ref 150–400)
PLATELET # BLD AUTO: 139 K/UL — LOW (ref 150–400)
PLATELET # BLD AUTO: 140 K/UL — LOW (ref 150–400)
PLATELET # BLD AUTO: 141 K/UL — LOW (ref 150–400)
PLATELET # BLD AUTO: 141 K/UL — LOW (ref 150–400)
PLATELET # BLD AUTO: 152 K/UL — SIGNIFICANT CHANGE UP (ref 150–400)
POIKILOCYTOSIS BLD QL AUTO: SLIGHT — SIGNIFICANT CHANGE UP
POTASSIUM SERPL-MCNC: 3.5 MMOL/L — SIGNIFICANT CHANGE UP (ref 3.5–5.3)
POTASSIUM SERPL-MCNC: 3.6 MMOL/L — SIGNIFICANT CHANGE UP (ref 3.5–5.3)
POTASSIUM SERPL-MCNC: 3.8 MMOL/L — SIGNIFICANT CHANGE UP (ref 3.5–5.3)
POTASSIUM SERPL-MCNC: 3.8 MMOL/L — SIGNIFICANT CHANGE UP (ref 3.5–5.3)
POTASSIUM SERPL-MCNC: 4 MMOL/L — SIGNIFICANT CHANGE UP (ref 3.5–5.3)
POTASSIUM SERPL-MCNC: 4 MMOL/L — SIGNIFICANT CHANGE UP (ref 3.5–5.3)
POTASSIUM SERPL-MCNC: 4.1 MMOL/L — SIGNIFICANT CHANGE UP (ref 3.5–5.3)
POTASSIUM SERPL-MCNC: 4.1 MMOL/L — SIGNIFICANT CHANGE UP (ref 3.5–5.3)
POTASSIUM SERPL-MCNC: 4.3 MMOL/L — SIGNIFICANT CHANGE UP (ref 3.5–5.3)
POTASSIUM SERPL-SCNC: 3.5 MMOL/L — SIGNIFICANT CHANGE UP (ref 3.5–5.3)
POTASSIUM SERPL-SCNC: 3.6 MMOL/L — SIGNIFICANT CHANGE UP (ref 3.5–5.3)
POTASSIUM SERPL-SCNC: 3.8 MMOL/L — SIGNIFICANT CHANGE UP (ref 3.5–5.3)
POTASSIUM SERPL-SCNC: 3.8 MMOL/L — SIGNIFICANT CHANGE UP (ref 3.5–5.3)
POTASSIUM SERPL-SCNC: 4 MMOL/L — SIGNIFICANT CHANGE UP (ref 3.5–5.3)
POTASSIUM SERPL-SCNC: 4 MMOL/L — SIGNIFICANT CHANGE UP (ref 3.5–5.3)
POTASSIUM SERPL-SCNC: 4.1 MMOL/L — SIGNIFICANT CHANGE UP (ref 3.5–5.3)
POTASSIUM SERPL-SCNC: 4.1 MMOL/L — SIGNIFICANT CHANGE UP (ref 3.5–5.3)
POTASSIUM SERPL-SCNC: 4.3 MMOL/L — SIGNIFICANT CHANGE UP (ref 3.5–5.3)
PROT SERPL-MCNC: 5.6 G/DL — LOW (ref 6–8.3)
PROT SERPL-MCNC: 5.6 G/DL — LOW (ref 6–8.3)
PROT SERPL-MCNC: 5.7 G/DL — LOW (ref 6–8.3)
PROT SERPL-MCNC: 5.8 G/DL — LOW (ref 6–8.3)
PROT SERPL-MCNC: 5.9 G/DL — LOW (ref 6–8.3)
PROT UR-MCNC: 30 MG/DL
PROTHROM AB SERPL-ACNC: 28.6 SEC — HIGH (ref 10.5–13.4)
PROTHROM AB SERPL-ACNC: 29.1 SEC — HIGH (ref 10.5–13.4)
PROTHROM AB SERPL-ACNC: 31.7 SEC — HIGH (ref 10.5–13.4)
RAPID RVP RESULT: DETECTED
RBC # BLD: 4.33 M/UL — SIGNIFICANT CHANGE UP (ref 4.2–5.8)
RBC # BLD: 4.37 M/UL — SIGNIFICANT CHANGE UP (ref 4.2–5.8)
RBC # BLD: 4.39 M/UL — SIGNIFICANT CHANGE UP (ref 4.2–5.8)
RBC # BLD: 4.39 M/UL — SIGNIFICANT CHANGE UP (ref 4.2–5.8)
RBC # BLD: 4.48 M/UL — SIGNIFICANT CHANGE UP (ref 4.2–5.8)
RBC # BLD: 4.6 M/UL — SIGNIFICANT CHANGE UP (ref 4.2–5.8)
RBC # BLD: 4.64 M/UL — SIGNIFICANT CHANGE UP (ref 4.2–5.8)
RBC # BLD: 4.64 M/UL — SIGNIFICANT CHANGE UP (ref 4.2–5.8)
RBC # FLD: 16.8 % — HIGH (ref 10.3–14.5)
RBC # FLD: 17.1 % — HIGH (ref 10.3–14.5)
RBC # FLD: 17.3 % — HIGH (ref 10.3–14.5)
RBC # FLD: 17.9 % — HIGH (ref 10.3–14.5)
RBC # FLD: 18.1 % — HIGH (ref 10.3–14.5)
RBC # FLD: 18.2 % — HIGH (ref 10.3–14.5)
RBC # FLD: 18.3 % — HIGH (ref 10.3–14.5)
RBC # FLD: 18.6 % — HIGH (ref 10.3–14.5)
RBC BLD AUTO: ABNORMAL
RBC CASTS # UR COMP ASSIST: SIGNIFICANT CHANGE UP /HPF (ref 0–4)
RCV VOL RI: 3000 /UL — HIGH (ref 0–0)
SARS-COV-2 RNA SPEC QL NAA+PROBE: DETECTED
SODIUM SERPL-SCNC: 129 MMOL/L — LOW (ref 135–145)
SODIUM SERPL-SCNC: 129 MMOL/L — LOW (ref 135–145)
SODIUM SERPL-SCNC: 130 MMOL/L — LOW (ref 135–145)
SODIUM SERPL-SCNC: 132 MMOL/L — LOW (ref 135–145)
SODIUM SERPL-SCNC: 133 MMOL/L — LOW (ref 135–145)
SODIUM SERPL-SCNC: 134 MMOL/L — LOW (ref 135–145)
SODIUM SERPL-SCNC: 135 MMOL/L — SIGNIFICANT CHANGE UP (ref 135–145)
SODIUM SERPL-SCNC: 137 MMOL/L — SIGNIFICANT CHANGE UP (ref 135–145)
SP GR SPEC: 1.01 — SIGNIFICANT CHANGE UP (ref 1.01–1.02)
SPECIMEN SOURCE: SIGNIFICANT CHANGE UP
TARGETS BLD QL SMEAR: SLIGHT — SIGNIFICANT CHANGE UP
TOTAL NUCLEATED CELL COUNT, BODY FLUID: 259 /UL — SIGNIFICANT CHANGE UP
TUBE TYPE: SIGNIFICANT CHANGE UP
UROBILINOGEN FLD QL: 1
VARIANT LYMPHS # BLD: 1 % — SIGNIFICANT CHANGE UP (ref 0–6)
WBC # BLD: 12.84 K/UL — HIGH (ref 3.8–10.5)
WBC # BLD: 13.9 K/UL — HIGH (ref 3.8–10.5)
WBC # BLD: 13.93 K/UL — HIGH (ref 3.8–10.5)
WBC # BLD: 14.05 K/UL — HIGH (ref 3.8–10.5)
WBC # BLD: 16.81 K/UL — HIGH (ref 3.8–10.5)
WBC # BLD: 17.91 K/UL — HIGH (ref 3.8–10.5)
WBC # BLD: 18.56 K/UL — HIGH (ref 3.8–10.5)
WBC # BLD: 19.1 K/UL — HIGH (ref 3.8–10.5)
WBC # FLD AUTO: 12.84 K/UL — HIGH (ref 3.8–10.5)
WBC # FLD AUTO: 13.9 K/UL — HIGH (ref 3.8–10.5)
WBC # FLD AUTO: 13.93 K/UL — HIGH (ref 3.8–10.5)
WBC # FLD AUTO: 14.05 K/UL — HIGH (ref 3.8–10.5)
WBC # FLD AUTO: 16.81 K/UL — HIGH (ref 3.8–10.5)
WBC # FLD AUTO: 17.91 K/UL — HIGH (ref 3.8–10.5)
WBC # FLD AUTO: 18.56 K/UL — HIGH (ref 3.8–10.5)
WBC # FLD AUTO: 19.1 K/UL — HIGH (ref 3.8–10.5)
WBC UR QL: SIGNIFICANT CHANGE UP /HPF (ref 0–5)

## 2023-01-01 PROCEDURE — 99223 1ST HOSP IP/OBS HIGH 75: CPT

## 2023-01-01 PROCEDURE — 99233 SBSQ HOSP IP/OBS HIGH 50: CPT

## 2023-01-01 PROCEDURE — 99498 ADVNCD CARE PLAN ADDL 30 MIN: CPT

## 2023-01-01 PROCEDURE — 99232 SBSQ HOSP IP/OBS MODERATE 35: CPT

## 2023-01-01 PROCEDURE — 82533 TOTAL CORTISOL: CPT

## 2023-01-01 PROCEDURE — 83690 ASSAY OF LIPASE: CPT

## 2023-01-01 PROCEDURE — 87040 BLOOD CULTURE FOR BACTERIA: CPT

## 2023-01-01 PROCEDURE — 99497 ADVNCD CARE PLAN 30 MIN: CPT

## 2023-01-01 PROCEDURE — 93005 ELECTROCARDIOGRAM TRACING: CPT

## 2023-01-01 PROCEDURE — 99285 EMERGENCY DEPT VISIT HI MDM: CPT

## 2023-01-01 PROCEDURE — 93010 ELECTROCARDIOGRAM REPORT: CPT

## 2023-01-01 PROCEDURE — 86901 BLOOD TYPING SEROLOGIC RH(D): CPT

## 2023-01-01 PROCEDURE — 86850 RBC ANTIBODY SCREEN: CPT

## 2023-01-01 PROCEDURE — 82962 GLUCOSE BLOOD TEST: CPT

## 2023-01-01 PROCEDURE — 87086 URINE CULTURE/COLONY COUNT: CPT

## 2023-01-01 PROCEDURE — 85025 COMPLETE CBC W/AUTO DIFF WBC: CPT

## 2023-01-01 PROCEDURE — 71045 X-RAY EXAM CHEST 1 VIEW: CPT | Mod: 26

## 2023-01-01 PROCEDURE — 85027 COMPLETE CBC AUTOMATED: CPT

## 2023-01-01 PROCEDURE — 12345: CPT | Mod: NC

## 2023-01-01 PROCEDURE — 85610 PROTHROMBIN TIME: CPT

## 2023-01-01 PROCEDURE — 99053 MED SERV 10PM-8AM 24 HR FAC: CPT

## 2023-01-01 PROCEDURE — 71045 X-RAY EXAM CHEST 1 VIEW: CPT

## 2023-01-01 PROCEDURE — 0225U NFCT DS DNA&RNA 21 SARSCOV2: CPT

## 2023-01-01 PROCEDURE — 99497 ADVNCD CARE PLAN 30 MIN: CPT | Mod: 25

## 2023-01-01 PROCEDURE — 86900 BLOOD TYPING SEROLOGIC ABO: CPT

## 2023-01-01 PROCEDURE — 74176 CT ABD & PELVIS W/O CONTRAST: CPT

## 2023-01-01 PROCEDURE — 36415 COLL VENOUS BLD VENIPUNCTURE: CPT

## 2023-01-01 PROCEDURE — 99222 1ST HOSP IP/OBS MODERATE 55: CPT

## 2023-01-01 PROCEDURE — 80053 COMPREHEN METABOLIC PANEL: CPT

## 2023-01-01 PROCEDURE — 96374 THER/PROPH/DIAG INJ IV PUSH: CPT

## 2023-01-01 PROCEDURE — 74176 CT ABD & PELVIS W/O CONTRAST: CPT | Mod: 26

## 2023-01-01 PROCEDURE — 84100 ASSAY OF PHOSPHORUS: CPT

## 2023-01-01 PROCEDURE — 80048 BASIC METABOLIC PNL TOTAL CA: CPT

## 2023-01-01 PROCEDURE — 88108 CYTOPATH CONCENTRATE TECH: CPT | Mod: 26

## 2023-01-01 PROCEDURE — 70450 CT HEAD/BRAIN W/O DYE: CPT | Mod: 26,MA

## 2023-01-01 PROCEDURE — 70450 CT HEAD/BRAIN W/O DYE: CPT | Mod: MA

## 2023-01-01 PROCEDURE — 83605 ASSAY OF LACTIC ACID: CPT

## 2023-01-01 PROCEDURE — 83735 ASSAY OF MAGNESIUM: CPT

## 2023-01-01 PROCEDURE — 85730 THROMBOPLASTIN TIME PARTIAL: CPT

## 2023-01-01 PROCEDURE — 83036 HEMOGLOBIN GLYCOSYLATED A1C: CPT

## 2023-01-01 PROCEDURE — 82140 ASSAY OF AMMONIA: CPT

## 2023-01-01 PROCEDURE — 89051 BODY FLUID CELL COUNT: CPT

## 2023-01-01 PROCEDURE — 81001 URINALYSIS AUTO W/SCOPE: CPT

## 2023-01-01 RX ORDER — DEXAMETHASONE 0.5 MG/5ML
6 ELIXIR ORAL DAILY
Refills: 0 | Status: DISCONTINUED | OUTPATIENT
Start: 2023-01-01 | End: 2023-01-01

## 2023-01-01 RX ORDER — DEXTROSE 50 % IN WATER 50 %
12.5 SYRINGE (ML) INTRAVENOUS ONCE
Refills: 0 | Status: DISCONTINUED | OUTPATIENT
Start: 2023-01-01 | End: 2023-01-01

## 2023-01-01 RX ORDER — FUROSEMIDE 40 MG
1 TABLET ORAL
Qty: 0 | Refills: 0 | DISCHARGE

## 2023-01-01 RX ORDER — ENOXAPARIN SODIUM 100 MG/ML
40 INJECTION SUBCUTANEOUS EVERY 24 HOURS
Refills: 0 | Status: DISCONTINUED | OUTPATIENT
Start: 2023-01-01 | End: 2023-01-01

## 2023-01-01 RX ORDER — NYSTATIN 500MM UNIT
500000 POWDER (EA) MISCELLANEOUS
Refills: 0 | Status: DISCONTINUED | OUTPATIENT
Start: 2023-01-01 | End: 2023-01-01

## 2023-01-01 RX ORDER — HYDROCORTISONE 20 MG
100 TABLET ORAL EVERY 8 HOURS
Refills: 0 | Status: DISCONTINUED | OUTPATIENT
Start: 2023-01-01 | End: 2023-01-01

## 2023-01-01 RX ORDER — SODIUM CHLORIDE 9 MG/ML
1000 INJECTION, SOLUTION INTRAVENOUS
Refills: 0 | Status: DISCONTINUED | OUTPATIENT
Start: 2023-01-01 | End: 2023-01-01

## 2023-01-01 RX ORDER — SCOPALAMINE 1 MG/3D
1 PATCH, EXTENDED RELEASE TRANSDERMAL
Refills: 0 | Status: DISCONTINUED | OUTPATIENT
Start: 2023-01-01 | End: 2023-01-01

## 2023-01-01 RX ORDER — DEXTROSE 50 % IN WATER 50 %
15 SYRINGE (ML) INTRAVENOUS ONCE
Refills: 0 | Status: DISCONTINUED | OUTPATIENT
Start: 2023-01-01 | End: 2023-01-01

## 2023-01-01 RX ORDER — PIPERACILLIN AND TAZOBACTAM 4; .5 G/20ML; G/20ML
3.38 INJECTION, POWDER, LYOPHILIZED, FOR SOLUTION INTRAVENOUS ONCE
Refills: 0 | Status: DISCONTINUED | OUTPATIENT
Start: 2023-01-01 | End: 2023-01-01

## 2023-01-01 RX ORDER — DEXAMETHASONE 0.5 MG/5ML
2 ELIXIR ORAL
Refills: 0 | Status: DISCONTINUED | OUTPATIENT
Start: 2023-01-01 | End: 2023-01-01

## 2023-01-01 RX ORDER — ZINC SULFATE TAB 220 MG (50 MG ZINC EQUIVALENT) 220 (50 ZN) MG
220 TAB ORAL DAILY
Refills: 0 | Status: DISCONTINUED | OUTPATIENT
Start: 2023-01-01 | End: 2023-01-01

## 2023-01-01 RX ORDER — LANOLIN ALCOHOL/MO/W.PET/CERES
3 CREAM (GRAM) TOPICAL AT BEDTIME
Refills: 0 | Status: DISCONTINUED | OUTPATIENT
Start: 2023-01-01 | End: 2023-01-01

## 2023-01-01 RX ORDER — SENNA PLUS 8.6 MG/1
1 TABLET ORAL
Qty: 0 | Refills: 0 | DISCHARGE

## 2023-01-01 RX ORDER — SODIUM CHLORIDE 9 MG/ML
500 INJECTION INTRAMUSCULAR; INTRAVENOUS; SUBCUTANEOUS ONCE
Refills: 0 | Status: COMPLETED | OUTPATIENT
Start: 2023-01-01 | End: 2023-01-01

## 2023-01-01 RX ORDER — SODIUM CHLORIDE 9 MG/ML
500 INJECTION INTRAMUSCULAR; INTRAVENOUS; SUBCUTANEOUS ONCE
Refills: 0 | Status: DISCONTINUED | OUTPATIENT
Start: 2023-01-01 | End: 2023-01-01

## 2023-01-01 RX ORDER — HYDROMORPHONE HYDROCHLORIDE 2 MG/ML
1 INJECTION INTRAMUSCULAR; INTRAVENOUS; SUBCUTANEOUS EVERY 4 HOURS
Refills: 0 | Status: DISCONTINUED | OUTPATIENT
Start: 2023-01-01 | End: 2023-01-01

## 2023-01-01 RX ORDER — PIPERACILLIN AND TAZOBACTAM 4; .5 G/20ML; G/20ML
3.38 INJECTION, POWDER, LYOPHILIZED, FOR SOLUTION INTRAVENOUS ONCE
Refills: 0 | Status: COMPLETED | OUTPATIENT
Start: 2023-01-01 | End: 2023-01-01

## 2023-01-01 RX ORDER — DEXAMETHASONE 0.5 MG/5ML
4 ELIXIR ORAL EVERY 6 HOURS
Refills: 0 | Status: DISCONTINUED | OUTPATIENT
Start: 2023-01-01 | End: 2023-01-01

## 2023-01-01 RX ORDER — SPIRONOLACTONE 25 MG/1
1 TABLET, FILM COATED ORAL
Qty: 0 | Refills: 0 | DISCHARGE

## 2023-01-01 RX ORDER — LACTULOSE 10 G/15ML
200 SOLUTION ORAL THREE TIMES A DAY
Refills: 0 | Status: DISCONTINUED | OUTPATIENT
Start: 2023-01-01 | End: 2023-01-01

## 2023-01-01 RX ORDER — DEXTROSE 50 % IN WATER 50 %
25 SYRINGE (ML) INTRAVENOUS ONCE
Refills: 0 | Status: DISCONTINUED | OUTPATIENT
Start: 2023-01-01 | End: 2023-01-01

## 2023-01-01 RX ORDER — DEXTROSE 50 % IN WATER 50 %
12.5 SYRINGE (ML) INTRAVENOUS ONCE
Refills: 0 | Status: COMPLETED | OUTPATIENT
Start: 2023-01-01 | End: 2023-01-01

## 2023-01-01 RX ORDER — PANTOPRAZOLE SODIUM 20 MG/1
40 TABLET, DELAYED RELEASE ORAL
Refills: 0 | Status: DISCONTINUED | OUTPATIENT
Start: 2023-01-01 | End: 2023-01-01

## 2023-01-01 RX ORDER — HYDROMORPHONE HYDROCHLORIDE 2 MG/ML
4 INJECTION INTRAMUSCULAR; INTRAVENOUS; SUBCUTANEOUS EVERY 4 HOURS
Refills: 0 | Status: DISCONTINUED | OUTPATIENT
Start: 2023-01-01 | End: 2023-01-01

## 2023-01-01 RX ORDER — DEXTROSE 50 % IN WATER 50 %
25 SYRINGE (ML) INTRAVENOUS ONCE
Refills: 0 | Status: COMPLETED | OUTPATIENT
Start: 2023-01-01 | End: 2023-01-01

## 2023-01-01 RX ORDER — SODIUM CHLORIDE 9 MG/ML
1000 INJECTION INTRAMUSCULAR; INTRAVENOUS; SUBCUTANEOUS ONCE
Refills: 0 | Status: DISCONTINUED | OUTPATIENT
Start: 2023-01-01 | End: 2023-01-01

## 2023-01-01 RX ORDER — HYDROMORPHONE HYDROCHLORIDE 2 MG/ML
1 INJECTION INTRAMUSCULAR; INTRAVENOUS; SUBCUTANEOUS
Qty: 0 | Refills: 0 | DISCHARGE

## 2023-01-01 RX ORDER — HYDROMORPHONE HYDROCHLORIDE 2 MG/ML
4 INJECTION INTRAMUSCULAR; INTRAVENOUS; SUBCUTANEOUS EVERY 6 HOURS
Refills: 0 | Status: DISCONTINUED | OUTPATIENT
Start: 2023-01-01 | End: 2023-01-01

## 2023-01-01 RX ORDER — HYDROMORPHONE HYDROCHLORIDE 2 MG/ML
0.5 INJECTION INTRAMUSCULAR; INTRAVENOUS; SUBCUTANEOUS ONCE
Refills: 0 | Status: DISCONTINUED | OUTPATIENT
Start: 2023-01-01 | End: 2023-01-01

## 2023-01-01 RX ORDER — LANOLIN ALCOHOL/MO/W.PET/CERES
6 CREAM (GRAM) TOPICAL AT BEDTIME
Refills: 0 | Status: DISCONTINUED | OUTPATIENT
Start: 2023-01-01 | End: 2023-01-01

## 2023-01-01 RX ORDER — PANTOPRAZOLE SODIUM 20 MG/1
1 TABLET, DELAYED RELEASE ORAL
Qty: 0 | Refills: 0 | DISCHARGE

## 2023-01-01 RX ORDER — SPIRONOLACTONE 25 MG/1
25 TABLET, FILM COATED ORAL DAILY
Refills: 0 | Status: DISCONTINUED | OUTPATIENT
Start: 2023-01-01 | End: 2023-01-01

## 2023-01-01 RX ORDER — CHOLECALCIFEROL (VITAMIN D3) 125 MCG
1000 CAPSULE ORAL DAILY
Refills: 0 | Status: DISCONTINUED | OUTPATIENT
Start: 2023-01-01 | End: 2023-01-01

## 2023-01-01 RX ORDER — HYDROMORPHONE HYDROCHLORIDE 2 MG/ML
0.5 INJECTION INTRAMUSCULAR; INTRAVENOUS; SUBCUTANEOUS EVERY 4 HOURS
Refills: 0 | Status: DISCONTINUED | OUTPATIENT
Start: 2023-01-01 | End: 2023-01-01

## 2023-01-01 RX ORDER — FUROSEMIDE 40 MG
20 TABLET ORAL DAILY
Refills: 0 | Status: DISCONTINUED | OUTPATIENT
Start: 2023-01-01 | End: 2023-01-01

## 2023-01-01 RX ORDER — CEFTRIAXONE 500 MG/1
2000 INJECTION, POWDER, FOR SOLUTION INTRAMUSCULAR; INTRAVENOUS EVERY 24 HOURS
Refills: 0 | Status: DISCONTINUED | OUTPATIENT
Start: 2023-01-01 | End: 2023-01-01

## 2023-01-01 RX ORDER — DEXTROSE 10 % IN WATER 10 %
1000 INTRAVENOUS SOLUTION INTRAVENOUS
Refills: 0 | Status: DISCONTINUED | OUTPATIENT
Start: 2023-01-01 | End: 2023-01-01

## 2023-01-01 RX ORDER — DEXAMETHASONE 0.5 MG/5ML
1 ELIXIR ORAL
Qty: 0 | Refills: 0 | DISCHARGE

## 2023-01-01 RX ORDER — HYDROMORPHONE HYDROCHLORIDE 2 MG/ML
0.5 INJECTION INTRAMUSCULAR; INTRAVENOUS; SUBCUTANEOUS EVERY 6 HOURS
Refills: 0 | Status: DISCONTINUED | OUTPATIENT
Start: 2023-01-01 | End: 2023-01-01

## 2023-01-01 RX ORDER — HYDROMORPHONE HYDROCHLORIDE 2 MG/ML
2 INJECTION INTRAMUSCULAR; INTRAVENOUS; SUBCUTANEOUS ONCE
Refills: 0 | Status: DISCONTINUED | OUTPATIENT
Start: 2023-01-01 | End: 2023-01-01

## 2023-01-01 RX ORDER — DEXAMETHASONE 0.5 MG/5ML
4 ELIXIR ORAL
Refills: 0 | Status: DISCONTINUED | OUTPATIENT
Start: 2023-01-01 | End: 2023-01-01

## 2023-01-01 RX ORDER — HYDROMORPHONE HYDROCHLORIDE 2 MG/ML
4 INJECTION INTRAMUSCULAR; INTRAVENOUS; SUBCUTANEOUS ONCE
Refills: 0 | Status: DISCONTINUED | OUTPATIENT
Start: 2023-01-01 | End: 2023-01-01

## 2023-01-01 RX ORDER — DEXTROSE 50 % IN WATER 50 %
50 SYRINGE (ML) INTRAVENOUS ONCE
Refills: 0 | Status: COMPLETED | OUTPATIENT
Start: 2023-01-01 | End: 2023-01-01

## 2023-01-01 RX ORDER — ALPRAZOLAM 0.25 MG
0.25 TABLET ORAL ONCE
Refills: 0 | Status: DISCONTINUED | OUTPATIENT
Start: 2023-01-01 | End: 2023-01-01

## 2023-01-01 RX ORDER — DEXTROSE 50 % IN WATER 50 %
15 SYRINGE (ML) INTRAVENOUS ONCE
Refills: 0 | Status: COMPLETED | OUTPATIENT
Start: 2023-01-01 | End: 2023-01-01

## 2023-01-01 RX ORDER — PIPERACILLIN AND TAZOBACTAM 4; .5 G/20ML; G/20ML
3.38 INJECTION, POWDER, LYOPHILIZED, FOR SOLUTION INTRAVENOUS EVERY 8 HOURS
Refills: 0 | Status: DISCONTINUED | OUTPATIENT
Start: 2023-01-01 | End: 2023-01-01

## 2023-01-01 RX ORDER — GLUCAGON INJECTION, SOLUTION 0.5 MG/.1ML
1 INJECTION, SOLUTION SUBCUTANEOUS ONCE
Refills: 0 | Status: DISCONTINUED | OUTPATIENT
Start: 2023-01-01 | End: 2023-01-01

## 2023-01-01 RX ORDER — ASCORBIC ACID 60 MG
500 TABLET,CHEWABLE ORAL DAILY
Refills: 0 | Status: DISCONTINUED | OUTPATIENT
Start: 2023-01-01 | End: 2023-01-01

## 2023-01-01 RX ORDER — LACTULOSE 10 G/15ML
20 SOLUTION ORAL ONCE
Refills: 0 | Status: COMPLETED | OUTPATIENT
Start: 2023-01-01 | End: 2023-01-01

## 2023-01-01 RX ORDER — LACTULOSE 10 G/15ML
200 SOLUTION ORAL
Refills: 0 | Status: DISCONTINUED | OUTPATIENT
Start: 2023-01-01 | End: 2023-01-01

## 2023-01-01 RX ORDER — PANTOPRAZOLE SODIUM 20 MG/1
40 TABLET, DELAYED RELEASE ORAL EVERY 12 HOURS
Refills: 0 | Status: DISCONTINUED | OUTPATIENT
Start: 2023-01-01 | End: 2023-01-01

## 2023-01-01 RX ADMIN — Medication 6 MILLIGRAM(S): at 21:39

## 2023-01-01 RX ADMIN — Medication 1000 UNIT(S): at 11:49

## 2023-01-01 RX ADMIN — SODIUM CHLORIDE 125 MILLILITER(S): 9 INJECTION, SOLUTION INTRAVENOUS at 11:48

## 2023-01-01 RX ADMIN — HYDROMORPHONE HYDROCHLORIDE 0.5 MILLIGRAM(S): 2 INJECTION INTRAMUSCULAR; INTRAVENOUS; SUBCUTANEOUS at 12:44

## 2023-01-01 RX ADMIN — Medication 4 MILLIGRAM(S): at 05:50

## 2023-01-01 RX ADMIN — Medication 15 GRAM(S): at 07:01

## 2023-01-01 RX ADMIN — SPIRONOLACTONE 25 MILLIGRAM(S): 25 TABLET, FILM COATED ORAL at 06:27

## 2023-01-01 RX ADMIN — LACTULOSE 200 GRAM(S): 10 SOLUTION ORAL at 15:28

## 2023-01-01 RX ADMIN — SCOPALAMINE 1 PATCH: 1 PATCH, EXTENDED RELEASE TRANSDERMAL at 12:13

## 2023-01-01 RX ADMIN — ENOXAPARIN SODIUM 40 MILLIGRAM(S): 100 INJECTION SUBCUTANEOUS at 16:52

## 2023-01-01 RX ADMIN — Medication 4 MILLIGRAM(S): at 23:35

## 2023-01-01 RX ADMIN — LACTULOSE 200 GRAM(S): 10 SOLUTION ORAL at 23:50

## 2023-01-01 RX ADMIN — PIPERACILLIN AND TAZOBACTAM 25 GRAM(S): 4; .5 INJECTION, POWDER, LYOPHILIZED, FOR SOLUTION INTRAVENOUS at 07:22

## 2023-01-01 RX ADMIN — PANTOPRAZOLE SODIUM 40 MILLIGRAM(S): 20 TABLET, DELAYED RELEASE ORAL at 15:09

## 2023-01-01 RX ADMIN — SODIUM CHLORIDE 70 MILLILITER(S): 9 INJECTION, SOLUTION INTRAVENOUS at 00:19

## 2023-01-01 RX ADMIN — Medication 1000 UNIT(S): at 13:35

## 2023-01-01 RX ADMIN — LACTULOSE 200 GRAM(S): 10 SOLUTION ORAL at 21:17

## 2023-01-01 RX ADMIN — ENOXAPARIN SODIUM 40 MILLIGRAM(S): 100 INJECTION SUBCUTANEOUS at 15:56

## 2023-01-01 RX ADMIN — HYDROMORPHONE HYDROCHLORIDE 4 MILLIGRAM(S): 2 INJECTION INTRAMUSCULAR; INTRAVENOUS; SUBCUTANEOUS at 19:35

## 2023-01-01 RX ADMIN — ENOXAPARIN SODIUM 40 MILLIGRAM(S): 100 INJECTION SUBCUTANEOUS at 18:01

## 2023-01-01 RX ADMIN — Medication 6 MILLIGRAM(S): at 21:56

## 2023-01-01 RX ADMIN — ZINC SULFATE TAB 220 MG (50 MG ZINC EQUIVALENT) 220 MILLIGRAM(S): 220 (50 ZN) TAB at 13:35

## 2023-01-01 RX ADMIN — SODIUM CHLORIDE 100 MILLILITER(S): 9 INJECTION, SOLUTION INTRAVENOUS at 22:35

## 2023-01-01 RX ADMIN — PIPERACILLIN AND TAZOBACTAM 25 GRAM(S): 4; .5 INJECTION, POWDER, LYOPHILIZED, FOR SOLUTION INTRAVENOUS at 13:08

## 2023-01-01 RX ADMIN — Medication 100 MILLIGRAM(S): at 09:31

## 2023-01-01 RX ADMIN — Medication 6 MILLIGRAM(S): at 06:00

## 2023-01-01 RX ADMIN — Medication 500000 UNIT(S): at 06:02

## 2023-01-01 RX ADMIN — Medication 1000 UNIT(S): at 13:04

## 2023-01-01 RX ADMIN — PANTOPRAZOLE SODIUM 40 MILLIGRAM(S): 20 TABLET, DELAYED RELEASE ORAL at 06:03

## 2023-01-01 RX ADMIN — LACTULOSE 200 GRAM(S): 10 SOLUTION ORAL at 23:00

## 2023-01-01 RX ADMIN — Medication 20 MILLIGRAM(S): at 06:03

## 2023-01-01 RX ADMIN — ENOXAPARIN SODIUM 40 MILLIGRAM(S): 100 INJECTION SUBCUTANEOUS at 15:09

## 2023-01-01 RX ADMIN — Medication 4 MILLIGRAM(S): at 06:30

## 2023-01-01 RX ADMIN — Medication 20 MILLIGRAM(S): at 15:08

## 2023-01-01 RX ADMIN — Medication 1000 UNIT(S): at 12:22

## 2023-01-01 RX ADMIN — PANTOPRAZOLE SODIUM 40 MILLIGRAM(S): 20 TABLET, DELAYED RELEASE ORAL at 06:01

## 2023-01-01 RX ADMIN — LACTULOSE 200 GRAM(S): 10 SOLUTION ORAL at 20:33

## 2023-01-01 RX ADMIN — Medication 500000 UNIT(S): at 05:44

## 2023-01-01 RX ADMIN — SODIUM CHLORIDE 500 MILLILITER(S): 9 INJECTION INTRAMUSCULAR; INTRAVENOUS; SUBCUTANEOUS at 18:00

## 2023-01-01 RX ADMIN — Medication 12.5 GRAM(S): at 11:05

## 2023-01-01 RX ADMIN — ENOXAPARIN SODIUM 40 MILLIGRAM(S): 100 INJECTION SUBCUTANEOUS at 14:49

## 2023-01-01 RX ADMIN — LACTULOSE 200 GRAM(S): 10 SOLUTION ORAL at 08:34

## 2023-01-01 RX ADMIN — LACTULOSE 200 GRAM(S): 10 SOLUTION ORAL at 17:34

## 2023-01-01 RX ADMIN — SODIUM CHLORIDE 70 MILLILITER(S): 9 INJECTION, SOLUTION INTRAVENOUS at 19:00

## 2023-01-01 RX ADMIN — Medication 50 MILLILITER(S): at 05:13

## 2023-01-01 RX ADMIN — PANTOPRAZOLE SODIUM 40 MILLIGRAM(S): 20 TABLET, DELAYED RELEASE ORAL at 17:57

## 2023-01-01 RX ADMIN — Medication 500000 UNIT(S): at 15:30

## 2023-01-01 RX ADMIN — Medication 20 MILLIGRAM(S): at 06:31

## 2023-01-01 RX ADMIN — Medication 25 GRAM(S): at 06:02

## 2023-01-01 RX ADMIN — Medication 500000 UNIT(S): at 19:17

## 2023-01-01 RX ADMIN — Medication 500000 UNIT(S): at 11:50

## 2023-01-01 RX ADMIN — Medication 500 MILLIGRAM(S): at 12:22

## 2023-01-01 RX ADMIN — Medication 20 MILLIGRAM(S): at 06:00

## 2023-01-01 RX ADMIN — Medication 4 MILLIGRAM(S): at 06:05

## 2023-01-01 RX ADMIN — Medication 500000 UNIT(S): at 17:55

## 2023-01-01 RX ADMIN — Medication 0.25 MILLIGRAM(S): at 21:36

## 2023-01-01 RX ADMIN — SODIUM CHLORIDE 100 MILLILITER(S): 9 INJECTION, SOLUTION INTRAVENOUS at 19:34

## 2023-01-01 RX ADMIN — HYDROMORPHONE HYDROCHLORIDE 2 MILLIGRAM(S): 2 INJECTION INTRAMUSCULAR; INTRAVENOUS; SUBCUTANEOUS at 22:20

## 2023-01-01 RX ADMIN — Medication 1000 UNIT(S): at 12:04

## 2023-01-01 RX ADMIN — SPIRONOLACTONE 25 MILLIGRAM(S): 25 TABLET, FILM COATED ORAL at 05:54

## 2023-01-01 RX ADMIN — HYDROMORPHONE HYDROCHLORIDE 0.5 MILLIGRAM(S): 2 INJECTION INTRAMUSCULAR; INTRAVENOUS; SUBCUTANEOUS at 21:54

## 2023-01-01 RX ADMIN — LACTULOSE 200 GRAM(S): 10 SOLUTION ORAL at 22:35

## 2023-01-01 RX ADMIN — Medication 1000 UNIT(S): at 11:54

## 2023-01-01 RX ADMIN — SODIUM CHLORIDE 150 MILLILITER(S): 9 INJECTION, SOLUTION INTRAVENOUS at 04:23

## 2023-01-01 RX ADMIN — PANTOPRAZOLE SODIUM 40 MILLIGRAM(S): 20 TABLET, DELAYED RELEASE ORAL at 07:21

## 2023-01-01 RX ADMIN — Medication 500 MILLIGRAM(S): at 13:04

## 2023-01-01 RX ADMIN — Medication 500 MILLIGRAM(S): at 11:49

## 2023-01-01 RX ADMIN — SPIRONOLACTONE 25 MILLIGRAM(S): 25 TABLET, FILM COATED ORAL at 06:03

## 2023-01-01 RX ADMIN — PIPERACILLIN AND TAZOBACTAM 25 GRAM(S): 4; .5 INJECTION, POWDER, LYOPHILIZED, FOR SOLUTION INTRAVENOUS at 05:03

## 2023-01-01 RX ADMIN — Medication 500000 UNIT(S): at 05:53

## 2023-01-01 RX ADMIN — Medication 500000 UNIT(S): at 16:56

## 2023-01-01 RX ADMIN — Medication 2 MILLIGRAM(S): at 17:44

## 2023-01-01 RX ADMIN — SODIUM CHLORIDE 125 MILLILITER(S): 9 INJECTION, SOLUTION INTRAVENOUS at 20:42

## 2023-01-01 RX ADMIN — Medication 20 MILLIGRAM(S): at 05:54

## 2023-01-01 RX ADMIN — PANTOPRAZOLE SODIUM 40 MILLIGRAM(S): 20 TABLET, DELAYED RELEASE ORAL at 06:27

## 2023-01-01 RX ADMIN — Medication 500000 UNIT(S): at 06:00

## 2023-01-01 RX ADMIN — LACTULOSE 200 GRAM(S): 10 SOLUTION ORAL at 06:31

## 2023-01-01 RX ADMIN — PANTOPRAZOLE SODIUM 40 MILLIGRAM(S): 20 TABLET, DELAYED RELEASE ORAL at 05:54

## 2023-01-01 RX ADMIN — HYDROMORPHONE HYDROCHLORIDE 0.5 MILLIGRAM(S): 2 INJECTION INTRAMUSCULAR; INTRAVENOUS; SUBCUTANEOUS at 21:39

## 2023-01-01 RX ADMIN — Medication 4 MILLIGRAM(S): at 12:22

## 2023-01-01 RX ADMIN — Medication 500000 UNIT(S): at 21:56

## 2023-01-01 RX ADMIN — Medication 20 MILLIGRAM(S): at 05:44

## 2023-01-01 RX ADMIN — SODIUM CHLORIDE 75 MILLILITER(S): 9 INJECTION, SOLUTION INTRAVENOUS at 07:37

## 2023-01-01 RX ADMIN — HYDROMORPHONE HYDROCHLORIDE 0.5 MILLIGRAM(S): 2 INJECTION INTRAMUSCULAR; INTRAVENOUS; SUBCUTANEOUS at 12:14

## 2023-01-01 RX ADMIN — ZINC SULFATE TAB 220 MG (50 MG ZINC EQUIVALENT) 220 MILLIGRAM(S): 220 (50 ZN) TAB at 12:04

## 2023-01-01 RX ADMIN — Medication 20 MILLIGRAM(S): at 06:27

## 2023-01-01 RX ADMIN — Medication 500000 UNIT(S): at 12:57

## 2023-01-01 RX ADMIN — Medication 2 MILLIGRAM(S): at 19:00

## 2023-01-01 RX ADMIN — ENOXAPARIN SODIUM 40 MILLIGRAM(S): 100 INJECTION SUBCUTANEOUS at 15:28

## 2023-01-01 RX ADMIN — Medication 500000 UNIT(S): at 21:17

## 2023-01-01 RX ADMIN — LACTULOSE 200 GRAM(S): 10 SOLUTION ORAL at 06:00

## 2023-01-01 RX ADMIN — Medication 4 MILLIGRAM(S): at 17:29

## 2023-01-01 RX ADMIN — SPIRONOLACTONE 25 MILLIGRAM(S): 25 TABLET, FILM COATED ORAL at 05:03

## 2023-01-01 RX ADMIN — ENOXAPARIN SODIUM 40 MILLIGRAM(S): 100 INJECTION SUBCUTANEOUS at 16:56

## 2023-01-01 RX ADMIN — LACTULOSE 200 GRAM(S): 10 SOLUTION ORAL at 17:47

## 2023-01-01 RX ADMIN — Medication 6 MILLIGRAM(S): at 23:00

## 2023-01-01 RX ADMIN — SODIUM CHLORIDE 100 MILLILITER(S): 9 INJECTION, SOLUTION INTRAVENOUS at 18:40

## 2023-01-01 RX ADMIN — Medication 500000 UNIT(S): at 12:04

## 2023-01-01 RX ADMIN — SPIRONOLACTONE 25 MILLIGRAM(S): 25 TABLET, FILM COATED ORAL at 05:44

## 2023-01-01 RX ADMIN — Medication 500000 UNIT(S): at 06:26

## 2023-01-01 RX ADMIN — ENOXAPARIN SODIUM 40 MILLIGRAM(S): 100 INJECTION SUBCUTANEOUS at 18:39

## 2023-01-01 RX ADMIN — Medication 500 MILLIGRAM(S): at 15:31

## 2023-01-01 RX ADMIN — Medication 500 MILLIGRAM(S): at 13:35

## 2023-01-01 RX ADMIN — ZINC SULFATE TAB 220 MG (50 MG ZINC EQUIVALENT) 220 MILLIGRAM(S): 220 (50 ZN) TAB at 11:49

## 2023-01-01 RX ADMIN — Medication 20 MILLIGRAM(S): at 05:03

## 2023-01-01 RX ADMIN — Medication 500000 UNIT(S): at 13:03

## 2023-01-01 RX ADMIN — ZINC SULFATE TAB 220 MG (50 MG ZINC EQUIVALENT) 220 MILLIGRAM(S): 220 (50 ZN) TAB at 12:22

## 2023-01-01 RX ADMIN — Medication 500000 UNIT(S): at 18:01

## 2023-01-01 RX ADMIN — PIPERACILLIN AND TAZOBACTAM 25 GRAM(S): 4; .5 INJECTION, POWDER, LYOPHILIZED, FOR SOLUTION INTRAVENOUS at 21:18

## 2023-01-01 RX ADMIN — HYDROMORPHONE HYDROCHLORIDE 4 MILLIGRAM(S): 2 INJECTION INTRAMUSCULAR; INTRAVENOUS; SUBCUTANEOUS at 08:00

## 2023-01-01 RX ADMIN — Medication 1000 UNIT(S): at 12:58

## 2023-01-01 RX ADMIN — Medication 6 MILLIGRAM(S): at 22:34

## 2023-01-01 RX ADMIN — PIPERACILLIN AND TAZOBACTAM 200 GRAM(S): 4; .5 INJECTION, POWDER, LYOPHILIZED, FOR SOLUTION INTRAVENOUS at 13:31

## 2023-01-01 RX ADMIN — Medication 500000 UNIT(S): at 23:30

## 2023-01-01 RX ADMIN — Medication 25 GRAM(S): at 15:52

## 2023-01-01 RX ADMIN — LACTULOSE 200 GRAM(S): 10 SOLUTION ORAL at 06:04

## 2023-01-01 RX ADMIN — Medication 500 MILLIGRAM(S): at 11:54

## 2023-01-01 RX ADMIN — LACTULOSE 200 GRAM(S): 10 SOLUTION ORAL at 16:33

## 2023-01-01 RX ADMIN — HYDROMORPHONE HYDROCHLORIDE 4 MILLIGRAM(S): 2 INJECTION INTRAMUSCULAR; INTRAVENOUS; SUBCUTANEOUS at 07:39

## 2023-01-01 RX ADMIN — LACTULOSE 200 GRAM(S): 10 SOLUTION ORAL at 18:40

## 2023-01-01 RX ADMIN — Medication 4 MILLIGRAM(S): at 12:58

## 2023-01-01 RX ADMIN — PANTOPRAZOLE SODIUM 40 MILLIGRAM(S): 20 TABLET, DELAYED RELEASE ORAL at 06:00

## 2023-01-01 RX ADMIN — Medication 500 MILLIGRAM(S): at 13:33

## 2023-01-01 RX ADMIN — SPIRONOLACTONE 25 MILLIGRAM(S): 25 TABLET, FILM COATED ORAL at 06:01

## 2023-01-01 RX ADMIN — Medication 2 MILLIGRAM(S): at 06:27

## 2023-01-01 RX ADMIN — ZINC SULFATE TAB 220 MG (50 MG ZINC EQUIVALENT) 220 MILLIGRAM(S): 220 (50 ZN) TAB at 13:31

## 2023-01-01 RX ADMIN — PANTOPRAZOLE SODIUM 40 MILLIGRAM(S): 20 TABLET, DELAYED RELEASE ORAL at 06:31

## 2023-01-01 RX ADMIN — Medication 6 MILLIGRAM(S): at 21:13

## 2023-01-01 RX ADMIN — Medication 500000 UNIT(S): at 21:13

## 2023-01-01 RX ADMIN — Medication 6 MILLIGRAM(S): at 05:54

## 2023-01-01 RX ADMIN — CEFTRIAXONE 100 MILLIGRAM(S): 500 INJECTION, POWDER, FOR SOLUTION INTRAMUSCULAR; INTRAVENOUS at 08:20

## 2023-01-01 RX ADMIN — Medication 500000 UNIT(S): at 23:35

## 2023-01-01 RX ADMIN — Medication 6 MILLIGRAM(S): at 16:56

## 2023-01-01 RX ADMIN — HYDROMORPHONE HYDROCHLORIDE 4 MILLIGRAM(S): 2 INJECTION INTRAMUSCULAR; INTRAVENOUS; SUBCUTANEOUS at 00:40

## 2023-01-01 RX ADMIN — SPIRONOLACTONE 25 MILLIGRAM(S): 25 TABLET, FILM COATED ORAL at 06:31

## 2023-01-01 RX ADMIN — Medication 6 MILLIGRAM(S): at 22:17

## 2023-01-01 RX ADMIN — HYDROMORPHONE HYDROCHLORIDE 4 MILLIGRAM(S): 2 INJECTION INTRAMUSCULAR; INTRAVENOUS; SUBCUTANEOUS at 03:49

## 2023-01-01 RX ADMIN — HYDROMORPHONE HYDROCHLORIDE 2 MILLIGRAM(S): 2 INJECTION INTRAMUSCULAR; INTRAVENOUS; SUBCUTANEOUS at 22:00

## 2023-01-01 RX ADMIN — PANTOPRAZOLE SODIUM 40 MILLIGRAM(S): 20 TABLET, DELAYED RELEASE ORAL at 05:04

## 2023-01-01 RX ADMIN — ENOXAPARIN SODIUM 40 MILLIGRAM(S): 100 INJECTION SUBCUTANEOUS at 15:30

## 2023-01-01 RX ADMIN — Medication 500000 UNIT(S): at 22:18

## 2023-01-01 RX ADMIN — HYDROMORPHONE HYDROCHLORIDE 4 MILLIGRAM(S): 2 INJECTION INTRAMUSCULAR; INTRAVENOUS; SUBCUTANEOUS at 21:30

## 2023-01-01 RX ADMIN — Medication 500000 UNIT(S): at 17:28

## 2023-01-01 RX ADMIN — PANTOPRAZOLE SODIUM 40 MILLIGRAM(S): 20 TABLET, DELAYED RELEASE ORAL at 05:44

## 2023-01-01 RX ADMIN — ZINC SULFATE TAB 220 MG (50 MG ZINC EQUIVALENT) 220 MILLIGRAM(S): 220 (50 ZN) TAB at 12:58

## 2023-01-01 RX ADMIN — Medication 4 MILLIGRAM(S): at 17:28

## 2023-01-01 RX ADMIN — PIPERACILLIN AND TAZOBACTAM 25 GRAM(S): 4; .5 INJECTION, POWDER, LYOPHILIZED, FOR SOLUTION INTRAVENOUS at 20:51

## 2023-01-01 RX ADMIN — Medication 500 MILLIGRAM(S): at 12:58

## 2023-01-01 RX ADMIN — Medication 4 MILLIGRAM(S): at 19:17

## 2023-01-01 RX ADMIN — HYDROMORPHONE HYDROCHLORIDE 4 MILLIGRAM(S): 2 INJECTION INTRAMUSCULAR; INTRAVENOUS; SUBCUTANEOUS at 04:22

## 2023-01-01 RX ADMIN — SODIUM CHLORIDE 150 MILLILITER(S): 9 INJECTION, SOLUTION INTRAVENOUS at 09:53

## 2023-01-01 RX ADMIN — Medication 6 MILLIGRAM(S): at 21:16

## 2023-01-01 RX ADMIN — Medication 500000 UNIT(S): at 12:41

## 2023-01-01 RX ADMIN — Medication 500 MILLIGRAM(S): at 12:42

## 2023-01-01 RX ADMIN — Medication 100 MILLIGRAM(S): at 14:56

## 2023-01-01 RX ADMIN — Medication 1000 UNIT(S): at 12:42

## 2023-01-01 RX ADMIN — ZINC SULFATE TAB 220 MG (50 MG ZINC EQUIVALENT) 220 MILLIGRAM(S): 220 (50 ZN) TAB at 12:42

## 2023-01-01 RX ADMIN — ZINC SULFATE TAB 220 MG (50 MG ZINC EQUIVALENT) 220 MILLIGRAM(S): 220 (50 ZN) TAB at 11:54

## 2023-01-01 RX ADMIN — Medication 500000 UNIT(S): at 06:31

## 2023-01-01 RX ADMIN — Medication 6 MILLIGRAM(S): at 21:36

## 2023-01-01 RX ADMIN — Medication 4 MILLIGRAM(S): at 17:56

## 2023-01-01 RX ADMIN — SPIRONOLACTONE 25 MILLIGRAM(S): 25 TABLET, FILM COATED ORAL at 06:00

## 2023-01-01 RX ADMIN — Medication 500000 UNIT(S): at 12:22

## 2023-01-01 RX ADMIN — Medication 500 MILLIGRAM(S): at 12:04

## 2023-01-01 RX ADMIN — SODIUM CHLORIDE 75 MILLILITER(S): 9 INJECTION, SOLUTION INTRAVENOUS at 19:40

## 2023-01-01 RX ADMIN — Medication 100 MILLIGRAM(S): at 07:22

## 2023-01-01 RX ADMIN — Medication 1000 UNIT(S): at 13:32

## 2023-01-01 RX ADMIN — Medication 1 MILLIGRAM(S): at 10:40

## 2023-01-01 RX ADMIN — HYDROMORPHONE HYDROCHLORIDE 4 MILLIGRAM(S): 2 INJECTION INTRAMUSCULAR; INTRAVENOUS; SUBCUTANEOUS at 00:19

## 2023-01-01 RX ADMIN — ZINC SULFATE TAB 220 MG (50 MG ZINC EQUIVALENT) 220 MILLIGRAM(S): 220 (50 ZN) TAB at 13:04

## 2023-01-01 RX ADMIN — Medication 4 MILLIGRAM(S): at 23:47

## 2023-01-01 RX ADMIN — Medication 100 MILLIGRAM(S): at 21:18

## 2023-01-01 RX ADMIN — Medication 2 MILLIGRAM(S): at 05:03

## 2023-01-01 RX ADMIN — SODIUM CHLORIDE 75 MILLILITER(S): 9 INJECTION, SOLUTION INTRAVENOUS at 09:22

## 2023-01-03 NOTE — CONSULT NOTE ADULT - SUBJECTIVE AND OBJECTIVE BOX
INTERVAL HPI/OVERNIGHT EVENTS:  HPI:  37 Chinese speaking male hx of recently dx metastatic liver cancer to lung about 2 months ago s/p recent abd Pleurx placed and getting drained 3x/week for the past 2 weeks (last drained yesterday) pw AMS and hypoglycemia at home. Pt unable to provide hx sec to AMS. Brother Kvng at bedside provided hx [phone]. He related pt with poor appetite for the past several days and today with noted sugars in 40s. Pt has some mild cough and had lower abd pain radiating to back since yesterday. No noticeable fevers, chills, SOB, NVD or dysuria. In ED afebrile P: 131 BP: 137/97 sat 97% on RA. initial FS 45 in ED s/p D50 and repeat  WBC:18.56 hgb: 13.6 plt 141 83%N INR:2.71 Na:130 cr: 0.57 TP: 5.6 alb: 2.3 TB: 2  AP: 199 AST/ALT: 78/78 Ammonia: 136 now COVID +CT head neg.     Pt has all his care at [facility]. Brother does not have info regarding pt's MD or number but to bring paperwork over later.   Pt is home hospice but is FULL CODE.     GI consultation called for metastatic liver cancer with sepsis r/o SBP. Patient seen and examined at bed side. His brother Kvng at bed side did more discussion. Patient Georgian speaking minimal communication.        (2023 07:45)    MEDICATIONS  (STANDING):  ascorbic acid 500 milliGRAM(s) Oral daily  cholecalciferol 1000 Unit(s) Oral daily  dextrose 5% + sodium chloride 0.9%. 1000 milliLiter(s) (150 mL/Hr) IV Continuous <Continuous>  dextrose 50% Injectable 25 Gram(s) IV Push once  dextrose 50% Injectable 12.5 Gram(s) IV Push once  dextrose 50% Injectable 25 Gram(s) IV Push once  dextrose Oral Gel 15 Gram(s) Oral once  enoxaparin Injectable 40 milliGRAM(s) SubCutaneous every 24 hours  glucagon  Injectable 1 milliGRAM(s) IntraMuscular once  hydrocortisone sodium succinate Injectable 100 milliGRAM(s) IV Push every 8 hours  lactulose Retention Enema 200 Gram(s) Rectal three times a day  pantoprazole  Injectable 40 milliGRAM(s) IV Push every 12 hours  piperacillin/tazobactam IVPB.. 3.375 Gram(s) IV Intermittent every 8 hours  sodium chloride 0.9% Bolus 500 milliLiter(s) IV Bolus once  zinc sulfate 220 milliGRAM(s) Oral daily    MEDICATIONS  (PRN):  HYDROmorphone   Tablet 4 milliGRAM(s) Oral every 4 hours PRN Severe Pain (7 - 10)      Allergies    No Known Allergies    Intolerances        PAST MEDICAL & SURGICAL HISTORY:  Liver cancer      H/O cirrhosis  	    PHYSICAL EXAM:   Vital Signs:  Vital Signs Last 24 Hrs  T(C): 37.5 (2023 07:38), Max: 37.5 (2023 05:41)  T(F): 99.5 (2023 07:38), Max: 99.5 (2023 05:41)  HR: 126 (2023 07:38) (124 - 131)  BP: 138/79 (2023 07:38) (136/97 - 138/79)  BP(mean): --  RR: 18 (2023 07:38) (17 - 18)  SpO2: 98% (2023 07:38) (97% - 98%)    Parameters below as of 2023 07:38  Patient On (Oxygen Delivery Method): room air      Daily Height in cm: 160.02 (2023 05:05)    Daily I&O's Summary    GENERAL:  Cachectic,  no distress  HEENT:  NC/AT,  conjunctivae clear and pink   CHEST:  Full & symmetric excursion, no increased effort, breath sounds clear  HEART:  Regular rhythm, S1, S2,  ABDOMEN:  Soft, mild tenderness upper quadrant mild to moderate distension , normoactive bowel sounds, Abdominal cathter right abdomen.   EXTREMITIES'  no cyanosis, clubbing or edema  SKIN:  warm/dry  NEURO:  Lethargic, answering questions .       LABS:                        13.2   13.93 )-----------( 127      ( 2023 12:30 )             39.8     01-03    129<L>  |  98  |  13  ----------------------------<  250<H>  3.8   |  23  |  0.76    Ca    8.3<L>      2023 12:30    TPro  5.8<L>  /  Alb  2.3<L>  /  TBili  2.1<H>  /  DBili  x   /  AST  94<H>  /  ALT  79<H>  /  AlkPhos  207<H>      PT/INR - ( 2023 05:45 )   PT: 31.7 sec;   INR: 2.71 ratio         PTT - ( 2023 05:45 )  PTT:40.4 sec  Urinalysis Basic - ( 2023 06:55 )    Color: Yellow / Appearance: Clear / S.015 / pH: x  Gluc: x / Ketone: Trace  / Bili: Negative / Urobili: 1   Blood: x / Protein: 30 mg/dL / Nitrite: Negative   Leuk Esterase: Trace / RBC: 0-4 /HPF / WBC 0-2 /HPF   Sq Epi: x / Non Sq Epi: Neg.-Few / Bacteria: Trace /HPF      amylase   lipaseLipase, Serum: 23 U/L ( @ 05:45)    RADIOLOGY & ADDITIONAL TESTS:

## 2023-01-03 NOTE — CONSULT NOTE ADULT - CONVERSATION DETAILS
Met and spoke with pts brother Kvng who was only family member at bedside and is involved with pts care. Brother says pt was very confused at home and is still confused this morning. brother reports pt dx w/ metastatic liver cancer approx 2 months ago, and said they was no chemotherapy offered. Reports pt was at [facility] but has no dr name or records. Says pt has been home on hospice services w/ [facility] . ( Confirmed this today w/ [facility] )Says his brother had lost so much weight and is getting weaker . When he was at home he became very weak and confused so 911 was called. I reviewed with him pts condition and poor prognosis, we discussed cpr and intubation and I did show him the CPR video in [place] as well as gave him Molst information and description all written in Bol[place]ian . I explained that doing cpr at this point would likely cause more harm and suffering and will not benefit his brother as his cancer will still remain.  Brother was tearful and says he understands , but he wishes to speak to pts wife and to pt if he becomes less confused . Brother understands that there is no treatment for the cancer , as it has spread , but wishes for some care at this time w/ iv fluids . I discussed if it is decided that pt does not want cpr, then pt could be a candidate for inpatient hospice services. I also d/w [facility] liaison today.   brother has my contact info for any questions.

## 2023-01-03 NOTE — CONSULT NOTE ADULT - SUBJECTIVE AND OBJECTIVE BOX
HPI: 37 Slovak speaking male hx of recently dx metastatic liver cancer to lung about 2 months ago s/p recent abd Pleurx placed and getting drained 3x/week for the past 2 weeks (last drained yesterday) pw AMS and hypoglycemia at home. Pt unable to provide hx sec to AMS. Brother Kvng at bedside provided hx [phone]. He related pt with poor appetite for the past several days and today with noted sugars in 40s. Pt has some mild cough and had lower abd pain radiating to back since yesterday. No noticeable fevers, chills, SOB, NVD or dysuria. In ED afebrile P: 131 BP: 137/97 sat 97% on RA,  initial FS 45 in ED s/p D50 and repeat  WBC:18.56 hgb: 13.6 plt 141 83%N INR:2.71 Na:130 cr: 0.57 TP: 5.6 alb: 2.3 TB: 2  AP: 199 AST/ALT: 78/78 Ammonia: 136 now COVID +CT head neg.     Pt has all his care at [facility]. Brother at bedside does not have info regarding pt's MD or number but to bring paperwork over later.       Pt is currently on home hospice services  w/ [facility]  but is FULL CODE.              PAST MEDICAL & SURGICAL HISTORY:  Liver cancer      H/O cirrhosis          SOCIAL HISTORY:    Admitted from:  home   Substance abuse history:              Tobacco hx:                  Alcohol hx:              Home Opioid hx:  Anglican:                                    Preferred Language:    Surrogate:   brother Kvng  (and pts  wife ? )          Phone#:  [phone]    FAMILY HISTORY:    Baseline ADLs (prior to admission):    Allergies    No Known Allergies    Intolerances      Unable to obtain due to poor mentation/ams    MEDICATIONS  (STANDING):  ascorbic acid 500 milliGRAM(s) Oral daily  cholecalciferol 1000 Unit(s) Oral daily  dextrose 5% + sodium chloride 0.9%. 1000 milliLiter(s) (150 mL/Hr) IV Continuous <Continuous>  dextrose 50% Injectable 25 Gram(s) IV Push once  dextrose 50% Injectable 12.5 Gram(s) IV Push once  dextrose 50% Injectable 25 Gram(s) IV Push once  dextrose Oral Gel 15 Gram(s) Oral once  enoxaparin Injectable 40 milliGRAM(s) SubCutaneous every 24 hours  glucagon  Injectable 1 milliGRAM(s) IntraMuscular once  hydrocortisone sodium succinate Injectable 100 milliGRAM(s) IV Push every 8 hours  lactulose Retention Enema 200 Gram(s) Rectal three times a day  pantoprazole  Injectable 40 milliGRAM(s) IV Push every 12 hours  piperacillin/tazobactam IVPB.. 3.375 Gram(s) IV Intermittent every 8 hours  sodium chloride 0.9% Bolus 500 milliLiter(s) IV Bolus once  zinc sulfate 220 milliGRAM(s) Oral daily    MEDICATIONS  (PRN):  HYDROmorphone   Tablet 4 milliGRAM(s) Oral every 4 hours PRN Severe Pain (7 - 10)      PHYSICAL EXAM:    Vital Signs Last 24 Hrs  T(C): 36.6 (2023 15:44), Max: 37.5 (2023 05:41)  T(F): 97.8 (2023 15:44), Max: 99.5 (2023 05:41)  HR: 112 (2023 15:44) (112 - 131)  BP: 112/86 (2023 15:44) (112/86 - 138/79)  BP(mean): --  RR: 14 (2023 15:44) (14 - 18)  SpO2: 98% (2023 15:44) (97% - 98%)    Parameters below as of 2023 15:44  Patient On (Oxygen Delivery Method): room air        General: alert  oriented x 1-2 lethargic,  + cachexia  verbal few words , very weakened , frail   Karnofsky Performance Score/Palliative Performance Status Version2:   30  %  PPSV: 30  HEENT: normal  dry mouth, + temp wasting   Lungs: cl breathing comfortable   CV: +  tachycardia  GI:  abd  distended , sl firm .  : normal  incontinent  ,   Musculoskeletal: normal  w/ weakness  , cachectic   Skin: normal, w/d   Neuro: awake, alert, w/ some  confusion noted,     Oral intake ability: unable to minimal   Diet: as damon     LABS:                        13.2   13.93 )-----------( 127      ( 2023 12:30 )             39.8     01-03    129<L>  |  98  |  13  ----------------------------<  250<H>  3.8   |  23  |  0.76    Ca    8.3<L>      2023 12:30    TPro  5.8<L>  /  Alb  2.3<L>  /  TBili  2.1<H>  /  DBili  x   /  AST  94<H>  /  ALT  79<H>  /  AlkPhos  207<H>      Urinalysis Basic - ( 2023 06:55 )    Color: Yellow / Appearance: Clear / S.015 / pH: x  Gluc: x / Ketone: Trace  / Bili: Negative / Urobili: 1   Blood: x / Protein: 30 mg/dL / Nitrite: Negative   Leuk Esterase: Trace / RBC: 0-4 /HPF / WBC 0-2 /HPF   Sq Epi: x / Non Sq Epi: Neg.-Few / Bacteria: Trace /HPF        RADIOLOGY & ADDITIONAL STUDIES: < from: CT Head No Cont (23 @ 06:21) >    ACC: [id number] EXAM:  CT BRAIN                          PROCEDURE DATE:  2023          INTERPRETATION:  CLINICAL INFORMATION:  ams    TECHNIQUE: Multiple axial CT images of the brain were obtained without   the administration of IV contrast.  Multiplanar 2D reformations obtained   from thin slice transverse reconstructions.    COMPARISON:  None available.    FINDINGS:    The ventricles and sulci are within normal limits. There is no sulcal   effacement. There is no intracranial hemorrhage,extra axial fluid   collection, mass effect or midline shift. There is no acute large vessel   territorial infarct. The skull is intact. The paranasal sinuses and   mastoid air cells are clear.      IMPRESSION:    No acute intracranial pathology.      < from: CT Abdomen and Pelvis No Cont (23 @ 07:58) >  ACC: [id number] EXAM:  CT ABDOMEN AND PELVIS                          PROCEDURE DATE:  2023          INTERPRETATION:  CLINICAL INFORMATION: Sepsis, abdominal pain. Reported   hepatic malignancy/cirrhosis.    COMPARISON: None.    CONTRAST/COMPLICATIONS:  IV Contrast: NONE  Oral Contrast: NONE  Complications: None reported at time of study completion    PROCEDURE:  CT of the Abdomen and Pelvis was performed.  Sagittal and coronal reformats were performed.    FINDINGS:  LOWER CHEST: Innumerable masses are visualized within the lung parenchyma   measuring up to 2.5 cm in size consistent with lung parenchymal   metastatic neoplastic disease.    LIVER: Hepatomegaly. Heterogeneous attenuation of the hepatic parenchyma   identified with innumerable masses within the hepatic parenchyma   measuring up to 7.5 cm consistent with reported hepatic malignancy.  BILE DUCTS: No intrahepatic or extrahepatic biliary ductal dilatation   identified.  GALLBLADDER: No gross gallstones or gallbladder wall thickening   identified.  SPLEEN: Splenomegaly.  PANCREAS: Within normal limits.  ADRENALS: Soft tissue nodularity measuring 1.9 cm is identified within   the right suprarenal region and a right adrenal nodule cannot be   excluded. The left adrenal appears unremarkable.  KIDNEYS/URETERS: No hydronephrosis. No renal calculi. No space-occupying   lesions of the renal parenchyma identified.    BLADDER: Minimally distended, precluding assessment.  REPRODUCTIVE ORGANS: The prostate appears unremarkable.    BOWEL: No evidence for mechanical bowel obstruction. Pancolonic wall   thickening is suggested raising suspicion for colitis. No evidence for   acute appendicitis.  PERITONEUM: Indwelling peritoneal change catheter. A small volume of   ascites isidentified with fluid noted within the right inguinal canal.   Diffuse stranding of the mesentery noted which may be related to the   presence of ascites; a 2.2 cm soft tissue nodule is identified posterior   to the cecum and a mesenteric neoplastic deposit cannot be excluded. No   free intraperitoneal air.  VESSELS: Recanalized paraumbilical vein and perisplenic varices likely   related to portal hypertension.  RETROPERITONEUM/LYMPH NODES: No lymphadenopathy.  ABDOMINAL WALL: Within normal limits.  BONES: A region of circumscribed lucency is identified within the   anterior/inferior aspect of the L4 vertebral body, a metastatic   neoplastic deposit cannot be excluded.    IMPRESSION:  1. Pancolonic wall thickening is suggested raising suspicion for colitis.  2. Innumerable masses are visualized within the lung parenchyma measuring   up to 2.5 cm in size consistent with lung parenchymal metastatic   neoplastic disease.  3. Hepatomegaly. Heterogeneous attenuation of the hepatic parenchyma   identified with innumerable masses within the hepatic parenchyma   measuring up to 7.5 cm consistent with reported hepatic malignancy.   Splenomegaly. Portal hypertension.  4. Indwelling peritoneal change catheter. A small volume of ascites is   identified with fluid noted within the right inguinal canal. Diffuse   stranding of the mesentery noted which may be related to the presence of   ascites; a 2.2 cm soft tissue nodule is identified posterior to the cecum   and a mesenteric neoplastic deposit cannot be excluded.  5. A region of circumscribed lucency is identified within the   anterior/inferior aspect of the L4 vertebral body, a metastatic   neoplastic deposit cannot be excluded.              ADVANCE DIRECTIVES: none  Advanced Care Planning discussion total time spent:

## 2023-01-03 NOTE — ED PROVIDER NOTE - OBJECTIVE STATEMENT
[id number].   HO from pt's brother.  [facility].  pt is confused for 2 hours as per pt's brother and pt.  pt's doctors are in [facility].  FS 45 in the ED.  No ho dm.  Decreased po intake.  No vomiting or diarrhea.  No ho low blood gluc or prior confusion. No sick contacts.  No other complaints.  [id number].   HO from pt's brother.  bibems.  pt is confused for 2 hours as per pt's brother and pt.  pt's doctors are in [facility].  FS 45 in the ED.  No ho dm.  Decreased po intake.  HO liver ca c mets to lung.  No vomiting or diarrhea.  No ho low blood gluc or prior confusion. No sick contacts.  No other complaints.

## 2023-01-03 NOTE — CONSULT NOTE ADULT - PROBLEM SELECTOR RECOMMENDATION 9
Acute change in mental status R/O hepatic encephalopathy   Drain Pleurx 3X week last drained yesterday  Check Pleurx fluid C&S R/O SBP   Continue Lactulose enema   His brother will yani [facility] discharge papers. Acute change in mental status R/O hepatic encephalopathy   Empiric antibiotics   Unlikely of SBP check cell count from Pleurx fluid    Continue Lactulose enema   His brother will [facility] discharge papers.

## 2023-01-03 NOTE — H&P ADULT - NSHPLABSRESULTS_GEN_ALL_CORE
13.6   18.56 )-----------( 141      ( 2023 05:45 )             41.2           130<L>  |  99  |  15  ----------------------------<  175<H>  4.1   |  23  |  0.57    Ca    8.2<L>      2023 05:45    TPro  5.6<L>  /  Alb  2.3<L>  /  TBili  2.0<H>  /  DBili  x   /  AST  78<H>  /  ALT  78<H>  /  AlkPhos  199<H>           LIVER FUNCTIONS - ( 2023 05:45 )  Alb: 2.3 g/dL / Pro: 5.6 g/dL / ALK PHOS: 199 U/L / ALT: 78 U/L / AST: 78 U/L / GGT: x             Lipase, Serum: 23 U/L (23 @ 05:45)    PT/INR - ( 2023 05:45 )   PT: 31.7 sec;   INR: 2.71 ratio         PTT - ( 2023 05:45 )  PTT:40.4 sec          Urinalysis Basic - ( 2023 06:55 )    Color: Yellow / Appearance: Clear / S.015 / pH: x  Gluc: x / Ketone: Trace  / Bili: Negative / Urobili: 1   Blood: x / Protein: 30 mg/dL / Nitrite: Negative   Leuk Esterase: Trace / RBC: 0-4 /HPF / WBC 0-2 /HPF   Sq Epi: x / Non Sq Epi: Neg.-Few / Bacteria: Trace /HPF        CAPILLARY BLOOD GLUCOSE      POCT Blood Glucose.: 121 mg/dL (2023 07:33)  POCT Blood Glucose.: 84 mg/dL (2023 07:03)  POCT Blood Glucose.: 69 mg/dL (2023 07:00)  POCT Blood Glucose.: 143 mg/dL (2023 05:40)  POCT Blood Glucose.: 45 mg/dL (2023 05:11)  POCT Blood Glucose.: 45 mg/dL (2023 05:08)        EKG: personally rev. sinus tachy at 126bpm, no acute st changes      CXR: wet read. mutliple nodules throughout bl lung fields.    < from: CT Head No Cont (23 @ 06:21) >    IMPRESSION:    No acute intracranial pathology.      < end of copied text >

## 2023-01-03 NOTE — CONSULT NOTE ADULT - PROBLEM SELECTOR RECOMMENDATION 9
metastatic with abdominal drain in place presenting with what appears to be a metabolic encephalopathy.  MELD score with approximately 30% mortality in the next three months, patient had been discharged to home hospice.  Appreciate GI follow up, empiric antibiotics for possible SBP.  Send ascitic fluid for cell count and culture.  Continue medical management of high ammonia level, pain.   Limited medical options currently available with advanced metastatic cancer, continue supportive care metastatic with abdominal drain in place presenting with what appears to be a metabolic encephalopathy.  MELD score with approximately 30% mortality in the next three months, patient had been discharged to home hospice.  Appreciate GI follow up, empiric antibiotics for possible SBP.  Send ascitic fluid for cell count and culture, drain PRN for patient comfort.  Continue medical management of high ammonia level, pain.   Limited medical options currently available with advanced metastatic cancer, continue supportive care metastatic with abdominal drain in place presenting with what appears to be a metabolic encephalopathy.  MELD score with approximately 30% mortality in the next three months, patient had been discharged to home hospice.  Appreciate GI follow up, empiric antibiotics for possible SBP.  Send ascitic fluid for cell count and culture, drain PRN for patient comfort.  Continue medical management of high ammonia level, pain.   Limited medical options currently available with advanced metastatic cancer, continue supportive care    GOC discussions ongoing with family and palliative care, and if DNR can go to inpatient hospice

## 2023-01-03 NOTE — H&P ADULT - ASSESSMENT
37 [place] speaking male hx of recently dx metastatic liver cancer to lung about 2 months ago s/p recent abd Pleurx placed and getting drained 3x/week for the past 2 weeks (last drained yesterday) pw AMS with hypoglycemia, elevated ammonia levels,   sepsis and +COVID.    # AMS with hepatic encephalopathy in setting of metastatic liver cancer  r/o infection joanne SBP in setting of sig leukocytosis  Check CTAP.  IV Ceftriaxone  GI consult  lactulose enemas (failed dysphagia in ED)   hold diuretics for now.     #Hypoglycemia  on dexamethasone.   switch to IV stress dose steroids.  cont D5 for now and taper off as tolerated.     #Metastatic liver cancer  brother to bring in info regarding pt's care providers  reportedly plans to start chemo when pt is more stable.   palliative care consult    #[facility]  Brother Kvng wants 'everything to be done' including full resuscitation.  37 [place] speaking male hx of recently dx metastatic liver cancer to lung about 2 months ago s/p recent abd Pleurx placed and getting drained 3x/week for the past 2 weeks (last drained yesterday) pw AMS with hypoglycemia, elevated ammonia levels,   sepsis and +COVID.    # AMS with hepatic encephalopathy in setting of metastatic liver cancer  r/o infection joanne SBP in setting of sig leukocytosis  Check CTAP.  IV Ceftriaxone  GI consult  lactulose enemas (failed dysphagia in ED)   hold diuretics for now.     #Hypoglycemia  on dexamethasone.   switch to IV stress dose steroids.  cont D5 for now and taper off as tolerated.     #Metastatic liver cancer  brother to bring in info regarding pt's care providers  reportedly plans to start chemo when pt is more stable.   palliative care consult    #COVID positive  not candidate for Remdesivir given LFTs/liver cancer  supportive care.     #[facility]  Brother Kvng wants 'everything to be done' including full resuscitation.

## 2023-01-03 NOTE — CHART NOTE - NSCHARTNOTEFT_GEN_A_CORE
admitted this AM  sepsis  pancolitis on CT  hypoglycemia  acute hepatic encephalopathy  metastatic liver cancer  lactic acidosis  home hospice but not DNR    - d/w brother Kvng, critical care, pall care, GI teams the plan of care  - continue aggressive IVF  - trend lactate q4-6 hours until normal  - broaden coverage to Zosyn  - f/up GI recs  - NPO with IVF  - hypoglycemia protocol  - lactulose enemas  - vitals q4  - await further info from OSH  - poor prognosis  - f/up onc recs    Jimi Aguayo MD, MHA, FACP, FHM  Available via Teams admitted this AM  sepsis  pancolitis on CT  hypoglycemia  acute hepatic encephalopathy  metastatic liver cancer  lactic acidosis  home hospice but not DNR    - d/w brother Kvng, critical care, pall care, GI teams the plan of care  - continue aggressive IVF  - likely type B lactic acidosis given stable hemodynamics  - broaden coverage to Zosyn  - f/up GI recs  - hypoglycemia protocol  - lactulose enemas  - vitals q4  - await further info from OSH  - poor prognosis  - f/up onc recs    Jiim Aguayo MD, MHA, FACP, FHM  Available via Teams

## 2023-01-03 NOTE — ED PROVIDER NOTE - CARE PLAN
1 Principal Discharge DX:	Hypoglycemia  Secondary Diagnosis:	Metabolic encephalopathy  Secondary Diagnosis:	Liver failure

## 2023-01-03 NOTE — H&P ADULT - NSHPREVIEWOFSYSTEMS_GEN_ALL_CORE
as per HPI    IMPROVE VTE Individual Risk Assessment          RISK                                                          Points  [  ] Previous VTE                                                3  [  ] Thrombophilia                                             2  [2  ] Lower limb paralysis                                   2        (unable to hold up >15 seconds)    [ 2 ] Current Cancer                                             2         (within 6 months)  [  ] Immobilization > 24 hrs                              1  [  ] ICU/CCU stay > 24 hours                             1  [  ] Age > 60                                                         1    IMPROVE VTE Score:         [   4      ]    Total Risk Factor Score:    0 - 1:   Consider IPC  >2 - 3:  Thromboprophylaxis required (enoxaparin or SQ heparin)        >4:   High Risk: Thromboprophylaxis required (enoxaparin or SQ heparin), optional add IPC  **If CONTRAINDICATION to enoxaparin or SQ heparin, USE IPCs**

## 2023-01-03 NOTE — CONSULT NOTE ADULT - SUBJECTIVE AND OBJECTIVE BOX
Source:  Reliability:    CC:    HPI  37yMale    PMH    PSH    Allergies    No Known Allergies    Intolerances        MEDICATIONS  (STANDING):  ascorbic acid 500 milliGRAM(s) Oral daily  cholecalciferol 1000 Unit(s) Oral daily  dextrose 5% + sodium chloride 0.9%. 1000 milliLiter(s) (150 mL/Hr) IV Continuous <Continuous>  dextrose 50% Injectable 25 Gram(s) IV Push once  dextrose 50% Injectable 12.5 Gram(s) IV Push once  dextrose 50% Injectable 25 Gram(s) IV Push once  dextrose Oral Gel 15 Gram(s) Oral once  enoxaparin Injectable 40 milliGRAM(s) SubCutaneous every 24 hours  glucagon  Injectable 1 milliGRAM(s) IntraMuscular once  hydrocortisone sodium succinate Injectable 100 milliGRAM(s) IV Push every 8 hours  lactulose Retention Enema 200 Gram(s) Rectal three times a day  pantoprazole  Injectable 40 milliGRAM(s) IV Push every 12 hours  piperacillin/tazobactam IVPB.. 3.375 Gram(s) IV Intermittent every 8 hours  sodium chloride 0.9% Bolus 500 milliLiter(s) IV Bolus once  zinc sulfate 220 milliGRAM(s) Oral daily    MEDICATIONS  (PRN):  HYDROmorphone   Tablet 4 milliGRAM(s) Oral every 4 hours PRN Severe Pain (7 - 10)      Psoc    Pfam    Review of Systems:   	CONSTITUTIONAL: Denies fever, weight loss, or fatigue  	ENT: Denies dysphagia, difficulty chewing, tinnitus, blurred vision, double vision  	RESPIRATORY: Denies cough, wheeze, hemoptysis, shortness of breath  	CARDIOVASCULAR: Denies chest pain, palpitations irregular HR  	GASTROINTESTINAL:  Denies nausea, vomiting, abdominal pain, diarrhea, constipation, melena, BRBPR, food intolerances  	GENITOURINARY: Denies dysuria, hematuria, urinary frequency, urinary incontinence  	NEUROLOGICAL: Denies headaches, syncope, near syncope, dizziness, lightheadedness, headaches, seizures  	SKIN: Denies rashes, masses, bruising, lesions   	MUSCULOSKELETAL: Denies history of OA or gout, joint swelling  	PSYCHIATRIC: Denies depression, anxiety, mood swings, or difficulty sleeping  	HEME: Denies history of DVT/PE, blood transfusion    Labs                        13.2   13.93 )-----------( 127      ( 03 Jan 2023 12:30 )             39.8       01-03    129<L>  |  98  |  13  ----------------------------<  250<H>  3.8   |  23  |  0.76    Ca    8.3<L>      03 Jan 2023 12:30    TPro  5.8<L>  /  Alb  2.3<L>  /  TBili  2.1<H>  /  DBili  x   /  AST  94<H>  /  ALT  79<H>  /  AlkPhos  207<H>  01-03            Lactic Acid Trend  01-03-23 @ 12:30   -   5.6<HH>  01-03-23 @ 11:23   -   4.5<HH>  01-03-23 @ 07:18   -   5.1<HH>          Radiology    Vital Signs Last 24 Hrs  T(C): 36.6 (03 Jan 2023 15:44), Max: 37.5 (03 Jan 2023 05:41)  T(F): 97.8 (03 Jan 2023 15:44), Max: 99.5 (03 Jan 2023 05:41)  HR: 103 (03 Jan 2023 15:59) (103 - 131)  BP: 129/- (03 Jan 2023 15:59) (112/86 - 138/79)  BP(mean): --  RR: 16 (03 Jan 2023 15:59) (14 - 18)  SpO2: 98% (03 Jan 2023 15:59) (97% - 98%)    Physical Exam  Gen:  WN/WD Male resting in bed, NAD  ENT:  NC/AT, no JVD noted  Thorax:  Symmetric, no retractions  Lung:  CTA b/l  CV:  S1, S2. RRR  Abd:  Soft, NT/ND.  BS normoactive, no masses to palp  Extrem:  No C/C/E, DP/radial pulses +2  Neuro:  No gross motor/sensory deficits  Psych:  Awake, alert and calm   Source:  Brother  Reliability:  Good    CC:  "The nurse called an ambulance because he was sleepy"  this morning    HPI  37 year old male with PMH recently diagnosed metastatic liver cancer who presented to ED today from home.  Patient had been hospitalized and evaluated at [facility] for end stage liver cancer last week, had been sent home with home hospice services.  Brother states at baseline his brother is awake and verbal although has had poor PO intake over the past several days.  This morning he noted that he was very sleepy.  Called the hospice nurse who activated EMS.    In ED patient found hypoglycemia and noted to be COVID and flu positive.  Remains hemodynamically stable with lactic acidosis and leukocytosis.  Blood sugar improved with D50, mental status improved with hydration.  Cannot elicit further history from patient as he remains lethargic.    PMH  Metastatic liver cancer with abdominal plurx in place, last drained yesterday.  Drained TIW    PSH  Abdominal plurx placement    Allergies  No known allergies     Current Medications  ascorbic acid 500 milliGRAM(s) Oral daily  cholecalciferol 1000 Unit(s) Oral daily  dextrose 5% + sodium chloride 0.9%. 1000 milliLiter(s) (150 mL/Hr) IV Continuous <Continuous>  enoxaparin Injectable 40 milliGRAM(s) SubCutaneous every 24 hours  glucagon  Injectable 1 milliGRAM(s) IntraMuscular once  hydrocortisone sodium succinate Injectable 100 milliGRAM(s) IV Push every 8 hours  lactulose Retention Enema 200 Gram(s) Rectal three times a day  pantoprazole  Injectable 40 milliGRAM(s) IV Push every 12 hours  piperacillin/tazobactam IVPB.. 3.375 Gram(s) IV Intermittent every 8 hours  sodium chloride 0.9% Bolus 500 milliLiter(s) IV Bolus once  zinc sulfate 220 milliGRAM(s) Oral daily  HYDROmorphone   Tablet 4 milliGRAM(s) Oral every 4 hours PRN Severe Pain (7 - 10)    Psoc  H  Pfam    Review of Systems  Unable to elicit at this time due to patient condition    Labs                        13.2   13.93 )-----------( 127      ( 03 Jan 2023 12:30 )             39.8       129<L>  |  98  |  13  ----------------------------<  250<H>  3.8   |  23  |  0.76    Ca    8.3<L>      03 Jan 2023 12:30  TPro  5.8<L>  /  Alb  2.3<L>  /  TBili  2.1<H>  /  DBili  x   /  AST  94<H>  /  ALT  79<H>  /  AlkPhos  207<H>  01-03    Lactic Acid Trend  01-03-23 @ 12:30   -   5.6<HH>  01-03-23 @ 11:23   -   4.5<HH>  01-03-23 @ 07:18   -   5.1<HH>    COVID  01-03-23 @ 05:45  COVID -   Detected<!>    Radiology    Vital Signs Last 24 Hrs  T(C): 36.6 (03 Jan 2023 15:44), Max: 37.5 (03 Jan 2023 05:41)  T(F): 97.8 (03 Jan 2023 15:44), Max: 99.5 (03 Jan 2023 05:41)  HR: 103 (03 Jan 2023 15:59) (103 - 131)  BP: 129/- (03 Jan 2023 15:59) (112/86 - 138/79)  BP(mean): --  RR: 16 (03 Jan 2023 15:59) (14 - 18)  SpO2: 98% (03 Jan 2023 15:59) (97% - 98%)    Physical Exam  Gen:  WN/WD Male resting in bed, NAD  ENT:  NC/AT, no JVD noted  Thorax:  Symmetric, no retractions  Lung:  CTA b/l  CV:  S1, S2. RRR  Abd:  Soft, NT/ND.  BS normoactive, no masses to palp  Extrem:  No C/C/E, DP/radial pulses +2  Neuro:  No gross motor/sensory deficits  Psych:  Awake, alert and calm   Source:  Brother  Reliability:  Good    CC:  "The nurse called an ambulance because he was sleepy"  this morning    HPI  37 year old male with PMH recently diagnosed metastatic liver cancer who presented to ED today from home.  Patient had been hospitalized and evaluated at [facility] for end stage liver cancer last week, had been sent home with home hospice services.  Brother states at baseline his brother is awake and verbal although has had poor PO intake over the past several days.  This morning he noted that he was very sleepy.  Called the hospice nurse who activated EMS.    In ED patient found hypoglycemia and noted to be COVID positive.  Remains hemodynamically stable with lactic acidosis and leukocytosis.  Blood sugar improved with D50, mental status improved with hydration.  Cannot elicit further history from patient as he remains lethargic.    PMH  Metastatic liver cancer with abdominal plurx in place, last drained yesterday.  Drained TIW    PSH  Abdominal plurx placement    Allergies  No known allergies     Current Medications  ascorbic acid 500 milliGRAM(s) Oral daily  cholecalciferol 1000 Unit(s) Oral daily  dextrose 5% + sodium chloride 0.9%. 1000 milliLiter(s) (150 mL/Hr) IV Continuous <Continuous>  enoxaparin Injectable 40 milliGRAM(s) SubCutaneous every 24 hours  glucagon  Injectable 1 milliGRAM(s) IntraMuscular once  hydrocortisone sodium succinate Injectable 100 milliGRAM(s) IV Push every 8 hours  lactulose Retention Enema 200 Gram(s) Rectal three times a day  pantoprazole  Injectable 40 milliGRAM(s) IV Push every 12 hours  piperacillin/tazobactam IVPB.. 3.375 Gram(s) IV Intermittent every 8 hours  sodium chloride 0.9% Bolus 500 milliLiter(s) IV Bolus once  zinc sulfate 220 milliGRAM(s) Oral daily  HYDROmorphone   Tablet 4 milliGRAM(s) Oral every 4 hours PRN Severe Pain (7 - 10)    Psoc  No documented history of tobacco or ETOH abuse  No documented history of substance abuse/illicit drug use    Pfam  No pertinent family history documented    Review of Systems  Unable to elicit at this time due to patient condition    Labs                        13.2   13.93 )-----------( 127      ( 03 Jan 2023 12:30 )             39.8       129<L>  |  98  |  13  ----------------------------<  250<H>  3.8   |  23  |  0.76    Ca    8.3<L>      03 Jan 2023 12:30  TPro  5.8<L>  /  Alb  2.3<L>  /  TBili  2.1<H>  /  DBili  x   /  AST  94<H>  /  ALT  79<H>  /  AlkPhos  207<H>  01-03    Ammonia, Serum: 136 umol/L (01.03.23 @ 05:45)     CAPILLARY BLOOD GLUCOSE    POCT Blood Glucose.: 160 mg/dL (03 Jan 2023 14:41)  POCT Blood Glucose.: 138 mg/dL (03 Jan 2023 11:40)  POCT Blood Glucose.: 61 mg/dL (03 Jan 2023 10:54)  POCT Blood Glucose.: 67 mg/dL (03 Jan 2023 10:53)  POCT Blood Glucose.: 91 mg/dL (03 Jan 2023 08:32)  POCT Blood Glucose.: 121 mg/dL (03 Jan 2023 07:33)  POCT Blood Glucose.: 84 mg/dL (03 Jan 2023 07:03)  POCT Blood Glucose.: 69 mg/dL (03 Jan 2023 07:00)  POCT Blood Glucose.: 143 mg/dL (03 Jan 2023 05:40)  POCT Blood Glucose.: 45 mg/dL (03 Jan 2023 05:11)  POCT Blood Glucose.: 45 mg/dL (03 Jan 2023 05:08)    Lactic Acid Trend  01-03-23 @ 12:30   -   5.6<HH>  01-03-23 @ 11:23   -   4.5<HH>  01-03-23 @ 07:18   -   5.1<HH>    COVID  01-03-23 @ 05:45  COVID -   Detected<!>    Radiology  ACC: [id number] EXAM:  CT ABDOMEN AND PELVIS                          PROCEDURE DATE:  01/03/2023      INTERPRETATION:  CLINICAL INFORMATION: Sepsis, abdominal pain. Reported   hepatic malignancy/cirrhosis.    COMPARISON: None.    CONTRAST/COMPLICATIONS:  IV Contrast: NONE  Oral Contrast: NONE  Complications: None reported at time of study completion    PROCEDURE:  CT of the Abdomen and Pelvis was performed.  Sagittal and coronal reformats were performed.    FINDINGS:  LOWER CHEST: Innumerable masses are visualized within the lung parenchyma   measuring up to 2.5 cm in size consistent with lung parenchymal   metastatic neoplastic disease.    LIVER: Hepatomegaly. Heterogeneous attenuation of the hepatic parenchyma   identified with innumerable masses within the hepatic parenchyma   measuring up to 7.5 cm consistent with reported hepatic malignancy.  BILE DUCTS: No intrahepatic or extrahepatic biliary ductal dilatation   identified.  GALLBLADDER: No gross gallstones or gallbladder wall thickening   identified.  SPLEEN: Splenomegaly.  PANCREAS: Within normal limits.  ADRENALS: Soft tissue nodularity measuring 1.9 cm is identified within   the right suprarenal region and a right adrenal nodule cannot be   excluded. The left adrenal appears unremarkable.  KIDNEYS/URETERS: No hydronephrosis. No renal calculi. No space-occupying   lesions of the renal parenchyma identified.    BLADDER: Minimally distended, precluding assessment.  REPRODUCTIVE ORGANS: The prostate appears unremarkable.    BOWEL: No evidence for mechanical bowel obstruction. Pancolonic wall   thickening is suggested raising suspicion for colitis. No evidence for   acute appendicitis.  PERITONEUM: Indwelling peritoneal change catheter. A small volume of   ascites isidentified with fluid noted within the right inguinal canal.   Diffuse stranding of the mesentery noted which may be related to the   presence of ascites; a 2.2 cm soft tissue nodule is identified posterior   to the cecum and a mesenteric neoplastic deposit cannot be excluded. No   free intraperitoneal air.  VESSELS: Recanalized paraumbilical vein and perisplenic varices likely   related to portal hypertension.  RETROPERITONEUM/LYMPH NODES: No lymphadenopathy.  ABDOMINAL WALL: Within normal limits.  BONES: A region of circumscribed lucency is identified within the   anterior/inferior aspect of the L4 vertebral body, a metastatic   neoplastic deposit cannot be excluded.    IMPRESSION:  1. Pancolonic wall thickening is suggested raising suspicion for colitis.  2. Innumerable masses are visualized within the lung parenchyma measuring   up to 2.5 cm in size consistent with lung parenchymal metastatic   neoplastic disease.  3. Hepatomegaly. Heterogeneous attenuation of the hepatic parenchyma   identified with innumerable masses within the hepatic parenchyma   measuring up to 7.5 cm consistent with reported hepatic malignancy.   Splenomegaly. Portal hypertension.  4. Indwelling peritoneal change catheter. A small volume of ascites is   identified with fluid noted within the right inguinal canal. Diffuse   stranding of the mesentery noted which may be related to the presence of   ascites; a 2.2 cm soft tissue nodule is identified posterior to the cecum   and a mesenteric neoplastic deposit cannot be excluded.  5. A region of circumscribed lucency is identified within the   anterior/inferior aspect of the L4 vertebral body, a metastatic   neoplastic deposit cannot be excluded.    DANIEL HARE MD; Attending Radiologist  This document has been electronically signed. Nick  3 2023  8:53AM    Vital Signs Last 24 Hrs  T(C): 36.6 (03 Jan 2023 15:44), Max: 37.5 (03 Jan 2023 05:41)  T(F): 97.8 (03 Jan 2023 15:44), Max: 99.5 (03 Jan 2023 05:41)  HR: 103 (03 Jan 2023 15:59) (103 - 131)  BP: 129/- (03 Jan 2023 15:59) (112/86 - 138/79)  RR: 16 (03 Jan 2023 15:59) (14 - 18)  SpO2: 98% RA (03 Jan 2023 15:59) (97% - 98%)    Physical Exam  Gen:  WN/WD Male resting in bed, cachectic appearing  ENT:  bitemporal wasting, no JVD noted  Thorax:  Symmetric, no retractions  Lung:  CTA b/l  CV:  S1, S2. RRR  Abd:  Softly distended, NT/ND.  BS normoactive, no masses to palp, drain with dressing C/D/I  Extrem:  No C/C/E, DP/radial pulses +2  Neuro:  No gross motor/sensory deficits  Psych:  Lethargic, responds to questioning    Source:  Brother  Reliability:  Good    CC:  "The nurse called an ambulance because he was sleepy"  this morning    HPI  37 year old male with PMH recently diagnosed metastatic liver cancer who presented to ED today from home.  Patient had been hospitalized and evaluated at [facility] for end stage liver cancer last week, had been sent home with home hospice services.  Brother states at baseline his brother is awake and verbal although has had poor PO intake over the past several days.  This morning he noted that he was very sleepy.  Called the hospice nurse who activated EMS.    In ED patient found hypoglycemia and noted to be COVID positive.  Remains hemodynamically stable with lactic acidosis and leukocytosis.  Blood sugar improved with D50, mental status improved with hydration.  Cannot elicit further history from patient as he remains lethargic.    Patient seen and examined with Pallaitive care NP, GI NP, And critical care team.  Brother bedside who states patient [facility] where wants patient to be treated but unsure of further advanced directives as patient has wife.    PMH  Metastatic liver cancer with abdominal plurx in place, last drained yesterday.  Drained TIW    PSH  Abdominal plurx placement    Allergies  No known allergies     Current Medications  ascorbic acid 500 milliGRAM(s) Oral daily  cholecalciferol 1000 Unit(s) Oral daily  dextrose 5% + sodium chloride 0.9%. 1000 milliLiter(s) (150 mL/Hr) IV Continuous <Continuous>  enoxaparin Injectable 40 milliGRAM(s) SubCutaneous every 24 hours  glucagon  Injectable 1 milliGRAM(s) IntraMuscular once  hydrocortisone sodium succinate Injectable 100 milliGRAM(s) IV Push every 8 hours  lactulose Retention Enema 200 Gram(s) Rectal three times a day  pantoprazole  Injectable 40 milliGRAM(s) IV Push every 12 hours  piperacillin/tazobactam IVPB.. 3.375 Gram(s) IV Intermittent every 8 hours  sodium chloride 0.9% Bolus 500 milliLiter(s) IV Bolus once  zinc sulfate 220 milliGRAM(s) Oral daily  HYDROmorphone   Tablet 4 milliGRAM(s) Oral every 4 hours PRN Severe Pain (7 - 10)    Psoc  No documented history of tobacco or ETOH abuse  No documented history of substance abuse/illicit drug use    Pfam  No pertinent family history documented    Review of Systems  Unable to elicit at this time due to patient condition    Labs                        13.2   13.93 )-----------( 127      ( 03 Jan 2023 12:30 )             39.8       129<L>  |  98  |  13  ----------------------------<  250<H>  3.8   |  23  |  0.76    Ca    8.3<L>      03 Jan 2023 12:30  TPro  5.8<L>  /  Alb  2.3<L>  /  TBili  2.1<H>  /  DBili  x   /  AST  94<H>  /  ALT  79<H>  /  AlkPhos  207<H>  01-03    Ammonia, Serum: 136 umol/L (01.03.23 @ 05:45)     CAPILLARY BLOOD GLUCOSE    POCT Blood Glucose.: 160 mg/dL (03 Jan 2023 14:41)  POCT Blood Glucose.: 138 mg/dL (03 Jan 2023 11:40)  POCT Blood Glucose.: 61 mg/dL (03 Jan 2023 10:54)  POCT Blood Glucose.: 67 mg/dL (03 Jan 2023 10:53)  POCT Blood Glucose.: 91 mg/dL (03 Jan 2023 08:32)  POCT Blood Glucose.: 121 mg/dL (03 Jan 2023 07:33)  POCT Blood Glucose.: 84 mg/dL (03 Jan 2023 07:03)  POCT Blood Glucose.: 69 mg/dL (03 Jan 2023 07:00)  POCT Blood Glucose.: 143 mg/dL (03 Jan 2023 05:40)  POCT Blood Glucose.: 45 mg/dL (03 Jan 2023 05:11)  POCT Blood Glucose.: 45 mg/dL (03 Jan 2023 05:08)    Lactic Acid Trend  01-03-23 @ 12:30   -   5.6<HH>  01-03-23 @ 11:23   -   4.5<HH>  01-03-23 @ 07:18   -   5.1<HH>    COVID  01-03-23 @ 05:45  COVID -   Detected<!>    Radiology  ACC: [id number] EXAM:  CT ABDOMEN AND PELVIS                          PROCEDURE DATE:  01/03/2023      INTERPRETATION:  CLINICAL INFORMATION: Sepsis, abdominal pain. Reported   hepatic malignancy/cirrhosis.    COMPARISON: None.    CONTRAST/COMPLICATIONS:  IV Contrast: NONE  Oral Contrast: NONE  Complications: None reported at time of study completion    PROCEDURE:  CT of the Abdomen and Pelvis was performed.  Sagittal and coronal reformats were performed.    FINDINGS:  LOWER CHEST: Innumerable masses are visualized within the lung parenchyma   measuring up to 2.5 cm in size consistent with lung parenchymal   metastatic neoplastic disease.    LIVER: Hepatomegaly. Heterogeneous attenuation of the hepatic parenchyma   identified with innumerable masses within the hepatic parenchyma   measuring up to 7.5 cm consistent with reported hepatic malignancy.  BILE DUCTS: No intrahepatic or extrahepatic biliary ductal dilatation   identified.  GALLBLADDER: No gross gallstones or gallbladder wall thickening   identified.  SPLEEN: Splenomegaly.  PANCREAS: Within normal limits.  ADRENALS: Soft tissue nodularity measuring 1.9 cm is identified within   the right suprarenal region and a right adrenal nodule cannot be   excluded. The left adrenal appears unremarkable.  KIDNEYS/URETERS: No hydronephrosis. No renal calculi. No space-occupying   lesions of the renal parenchyma identified.    BLADDER: Minimally distended, precluding assessment.  REPRODUCTIVE ORGANS: The prostate appears unremarkable.    BOWEL: No evidence for mechanical bowel obstruction. Pancolonic wall   thickening is suggested raising suspicion for colitis. No evidence for   acute appendicitis.  PERITONEUM: Indwelling peritoneal change catheter. A small volume of   ascites isidentified with fluid noted within the right inguinal canal.   Diffuse stranding of the mesentery noted which may be related to the   presence of ascites; a 2.2 cm soft tissue nodule is identified posterior   to the cecum and a mesenteric neoplastic deposit cannot be excluded. No   free intraperitoneal air.  VESSELS: Recanalized paraumbilical vein and perisplenic varices likely   related to portal hypertension.  RETROPERITONEUM/LYMPH NODES: No lymphadenopathy.  ABDOMINAL WALL: Within normal limits.  BONES: A region of circumscribed lucency is identified within the   anterior/inferior aspect of the L4 vertebral body, a metastatic   neoplastic deposit cannot be excluded.    IMPRESSION:  1. Pancolonic wall thickening is suggested raising suspicion for colitis.  2. Innumerable masses are visualized within the lung parenchyma measuring   up to 2.5 cm in size consistent with lung parenchymal metastatic   neoplastic disease.  3. Hepatomegaly. Heterogeneous attenuation of the hepatic parenchyma   identified with innumerable masses within the hepatic parenchyma   measuring up to 7.5 cm consistent with reported hepatic malignancy.   Splenomegaly. Portal hypertension.  4. Indwelling peritoneal change catheter. A small volume of ascites is   identified with fluid noted within the right inguinal canal. Diffuse   stranding of the mesentery noted which may be related to the presence of   ascites; a 2.2 cm soft tissue nodule is identified posterior to the cecum   and a mesenteric neoplastic deposit cannot be excluded.  5. A region of circumscribed lucency is identified within the   anterior/inferior aspect of the L4 vertebral body, a metastatic   neoplastic deposit cannot be excluded.    DANIEL HARE MD; Attending Radiologist  This document has been electronically signed. Nick  3 2023  8:53AM    Vital Signs Last 24 Hrs  T(C): 36.6 (03 Jan 2023 15:44), Max: 37.5 (03 Jan 2023 05:41)  T(F): 97.8 (03 Jan 2023 15:44), Max: 99.5 (03 Jan 2023 05:41)  HR: 103 (03 Jan 2023 15:59) (103 - 131)  BP: 129/- (03 Jan 2023 15:59) (112/86 - 138/79)  RR: 16 (03 Jan 2023 15:59) (14 - 18)  SpO2: 98% RA (03 Jan 2023 15:59) (97% - 98%)    Physical Exam  Gen:  WN/WD Male resting in bed, cachectic appearing  ENT:  bitemporal wasting, no JVD noted  Thorax:  Symmetric, no retractions  Lung:  CTA b/l  CV:  S1, S2. RRR  Abd:  Softly distended, NT/ND.  BS normoactive, no masses to palp, drain with dressing C/D/I  Extrem:  No C/C/E, DP/radial pulses +2  Neuro:  No gross motor/sensory deficits  Psych:  Lethargic, responds to questioning

## 2023-01-03 NOTE — CONSULT NOTE ADULT - PROBLEM SELECTOR RECOMMENDATION 2
Prefer further management at [facility]   He was on Home hospice care since discharge   Goals care to be addressed
Asymptomatic from respiratory standpoint although may be contributing to metabolic encephalopathy.  Not a candidate for current recommended COVID treatment regimen due to advanced cancer.  Continue supportive care

## 2023-01-03 NOTE — PROVIDER CONTACT NOTE (HYPOGLYCEMIA EVENT) - NS PROVIDER CONTACT BACKGROUND-HYPO
Age: 37y    Gender: Male    POCT Blood Glucose:  61 mg/dL (01-03-23 @ 10:54)  67 mg/dL (01-03-23 @ 10:53)  91 mg/dL (01-03-23 @ 08:32)  121 mg/dL (01-03-23 @ 07:33)  84 mg/dL (01-03-23 @ 07:03)  69 mg/dL (01-03-23 @ 07:00)  143 mg/dL (01-03-23 @ 05:40)  45 mg/dL (01-03-23 @ 05:11)      eMAR:dextrose 50% Injectable   50 milliLiter(s) IV Push (01-03-23 @ 05:13)    hydrocortisone sodium succinate Injectable   100 milliGRAM(s) IV Push (01-03-23 @ 09:31)    MD Aware. Pt NPO, unable to tolerate thin liquids. Meds given as per orders. Pt is alert and oriented.

## 2023-01-03 NOTE — H&P ADULT - NSHPPHYSICALEXAM_GEN_ALL_CORE
Vital Signs Last 24 Hrs  T(C): 37.5 (03 Jan 2023 05:41), Max: 37.5 (03 Jan 2023 05:41)  T(F): 99.5 (03 Jan 2023 05:41), Max: 99.5 (03 Jan 2023 05:41)  HR: 124 (03 Jan 2023 05:41) (124 - 131)  BP: 136/97 (03 Jan 2023 05:41) (136/97 - 137/97)  BP(mean): --  RR: 17 (03 Jan 2023 05:41) (17 - 18)  SpO2: 98% (03 Jan 2023 05:41) (97% - 98%)    Parameters below as of 03 Jan 2023 05:41  Patient On (Oxygen Delivery Method): room air      Daily Height in cm: 160.02 (03 Jan 2023 05:05)    Daily   CAPILLARY BLOOD GLUCOSE      POCT Blood Glucose.: 121 mg/dL (03 Jan 2023 07:33)    I&O's Summary      GENERAL: lethargic cachetic,  HEAD:  Normocephalic  EYES: EOMI, PERRLA, conjunctiva and sclera clear  ENMT: No tonsillar erythema, exudates, or enlargement; dry mucous membranes, No lesions  NECK: Supple, No JVD, no bruit, normal thyroid  NERVOUS SYSTEM:  lethargic grossly moves all fours.  CHEST/LUNG: Clear to auscultation bilaterally; No rales, rhonchi, wheezing, or rubs  HEART: Regular rate and rhythm; No murmurs, rubs, or gallops  ABDOMEN: Soft, mild L sided abd tenderness. mild to mod distention. abd catheter RLQ.  Bowel sounds present  EXTREMITIES:  2+ Peripheral Pulses, No clubbing, cyanosis, +bl LE edema  LYMPH: No lymphadenopathy noted  SKIN: No rashes or lesions

## 2023-01-03 NOTE — H&P ADULT - HISTORY OF PRESENT ILLNESS
37 Urdu speaking male hx of recently dx metastatic liver cancer to lung about 2 months ago s/p recent abd Pleurx placed and getting drained 3x/week for the past 2 weeks (last drained yesterday) pw AMS and hypoglycemia at home. Pt unable to provide hx sec to AMS. Brother Knvg at bedside provided hx [phone]. He related pt with poor appetite for the past several days and today with noted sugars in 40s. Pt has some mild cough and had lower abd pain radiating to back since yesterday. No noticeable fevers, chills, SOB, NVD or dysuria. In ED afebrile P: 131 BP: 137/97 sat 97% on RA. initial FS 45 in ED s/p D50 and repeat  WBC:18.56 hgb: 13.6 plt 141 83%N INR:2.71 Na:130 cr: 0.57 TP: 5.6 alb: 2.3 TB: 2  AP: 199 AST/ALT: 78/78 Ammonia: 136 now COVID +CT head neg.     Pt has all his care at [facility]. Brother does not have info regarding pt's MD or number but to bring paperwork over later.   Pt is home hospice but is FULL CODE.

## 2023-01-03 NOTE — CONSULT NOTE ADULT - PROBLEM SELECTOR RECOMMENDATION 3
Patient home hospice yet remains full code.  Discussed with brother at bedside ultimate goals of care, he seems and the patient seem to both understand that the patient is actively dying of cancer but there may be some confusion with regards to meaning of full code status.  Brother would like to see patient return to some level of functional status as he was at yesterday so is interesting in seeking some treatment, it seems unclear that he or patient fully understand that a full code status may bring interventions that will in actuality prolong suffering.    Appreciate palliative care's efforts and input.  Palliative care showed brother CPR video with assistance of [employer] translation, brother wishes to speak to patients other family and do some more thinking before changing code status.    At this time patient hemodynamically stable, lactic acidosis likely due to advanced liver cancer.  At this time there are no ICU interventions required or that would change outcomes.  If patient and family are considering aggressive care then consider transfer to [facility] where patient has received all his care as modalities of intervention in this situation such as interventional radiology and radiotherapy are unavailable at [facility].

## 2023-01-03 NOTE — CONSULT NOTE ADULT - NS ATTEND AMEND GEN_ALL_CORE FT
pt seen and examined  patient with metabolic encephelopathy due to worsening liver failure  doubt sepsis as cause of lactic acidosis, suspect liver failure    at this time patient hemodynamically stable and ogoing discussion with advanced directives  reconsult ICU as needed

## 2023-01-03 NOTE — ED ADULT TRIAGE NOTE - CHIEF COMPLAINT QUOTE
Patient presents to ED via EMS for altered mental status as per family and home nurse. Patient lethargic although follows commands. Seen by Dr. Burr and sedricktick 45. Was 96 as per EMS.

## 2023-01-04 NOTE — CONSULT NOTE ADULT - SUBJECTIVE AND OBJECTIVE BOX
NARINDER LAKHANI,  37y Male  MRN: [id number]  ATTENDING: Dr. Rosemarie Barr      HPI:  37M, [place] speaking only, reportedly diagnosed with metastatic hepatoma in October 2022 at [facility], admitted to home hospice, brought in by family to the ED at [place] with altered mentation and hypoglycemia.  Patient has been clinically deteriorating, has a Pleurx catheter in place and has therapeutic pleural fluid removal 3 times a week.  On arrival to the emergency room with abdominal pain patient had a CT abdomen did show hepatosplenomegaly, innumerable masses visualized within the lung parenchyma measuring up to 2.5 cm in size consistent with metastatic disease, innumerable masses in the hepatic parenchyma measuring up to 7.5 cm consistent with hepatic malignancy, portal hypertension, a small volume of ascites, L4 metastatic deposit.  CT head showed no acute pathology.  Evaluated by palliative care team in [place] deemed appropriate for hospice.  Patient has never been treated with systemic therapy.  Hematologic picture consistent with coagulopathy, secondary to hypersplenism/thrombocytopenia, elevated LFTs.  In the emergency room he was found encephalopathic, hypoglycemic, acute COVID +.  Medical oncology consulted to discuss prognosis.    PAST MEDICAL & SURGICAL HISTORY:  Liver cancer  H/O cirrhosis    MEDICATION:  ascorbic acid 500 milliGRAM(s) Oral daily  cholecalciferol 1000 Unit(s) Oral daily  dexAMETHasone     Tablet 2 milliGRAM(s) Oral two times a day  dextrose 50% Injectable 25 Gram(s) IV Push once  dextrose 50% Injectable 12.5 Gram(s) IV Push once  dextrose 50% Injectable 25 Gram(s) IV Push once  dextrose Oral Gel 15 Gram(s) Oral once  enoxaparin Injectable 40 milliGRAM(s) SubCutaneous every 24 hours  furosemide    Tablet 20 milliGRAM(s) Oral daily  glucagon  Injectable 1 milliGRAM(s) IntraMuscular once  lactulose Retention Enema 200 Gram(s) Rectal three times a day  pantoprazole    Tablet 40 milliGRAM(s) Oral before breakfast  piperacillin/tazobactam IVPB.. 3.375 Gram(s) IV Intermittent every 8 hours  sodium chloride 0.9% Bolus 500 milliLiter(s) IV Bolus once  spironolactone 25 milliGRAM(s) Oral daily  zinc sulfate 220 milliGRAM(s) Oral daily    ALLERGIES:  No Known Allergies    FAMILY HISTORY:  Reviewed, non-contributory: [ x ]     SOCIAL HISTORY:  Tobacco: YES [ ]  ; NO [ ]; Former smoker [ x ]  Alcohol:   YES [ ]  ; NO [ ]; Social alcohol user [ x]    REVIEW SYSTEMS:  Constitutional: +weight loss, no appetite  HEENT: denies visual changes; no oral ulcers, dysphagia, no epistaxis;   Respiratory: no dyspnea , wheezing, cough, hemoptysis; + acute covid  Cardiovascular: denies acute chest pain, palpitations  GI: no loss of appetite, dark stools, or abdominal tenderness / pain; no change in bowel habits.  Musculoskeletal: no new back pain, bone/ joint pain ,no extremity swelling  Integumentary: denies pruritus; + jaundice  Neurologic: denies peripheral numbness, no dizziness, no gait problems.  Heme: no reported easy bruisability; no lymph node enlargement    VITALS:  T(C): 37, Max: 37 (01-04-23 @ 02:00)  T(F): 98.6, Max: 98.6 (01-04-23 @ 02:00)  HR: 107 (103 - 112)  BP: 116/79 (112/86 - 129/-)  SpO2: 97% (97% - 98%)    PHYSICAL EXAM: examined in ER Hold  GENERAL: lethargic cachetic,  HEAD:  Normocephalic  EYES: EOMI, PERRLA, conjunctiva and sclera clear  ENMT: No tonsillar erythema, exudates, or enlargement; dry mucous membranes, No lesions  NECK: Supple, No JVD, no bruit, normal thyroid  NERVOUS SYSTEM:  lethargic grossly moves all fours.  CHEST/LUNG: Clear to auscultation bilaterally; No rales, rhonchi, wheezing, or rubs  HEART: Regular rate and rhythm; No murmurs, rubs, or gallops  ABDOMEN: Soft, mild L sided abd tenderness. mild to mod distention. abd catheter RLQ.  Bowel sounds present  EXTREMITIES:  2+ Peripheral Pulses, No clubbing, cyanosis, +bl LE edema  LYMPH: No lymphadenopathy noted  SKIN: No rashes or lesions        LABS:  (01-04) WBC: 12.84 K/uL,Hemoglobin: 13.1 g/dL, Hematocrit: 40.7 %,  Platelet: 139 K/uL  (01-04) Na: 133 mmol/L ; K: 4.0 mmol/L ; BUN: 13 mg/dL ; Cr: 0.40 mg/dL.  PT/INR - ( 03 Jan 2023 05:45 )   PT: 31.7 sec;   INR: 2.71 ratio  PTT - ( 03 Jan 2023 05:45 )  PTT:40.4 sec  Ca    8.2<L>      03 Jan 2023 05:45  TPro  5.6<L>  /  Alb  2.3<L>  /  TBili  2.0<H>  /  DBili  x   /  AST  78<H>  /  ALT  78<H>  /  AlkPhos  199<H>  01-03  LIVER FUNCTIONS - ( 03 Jan 2023 05:45 )  Alb: 2.3 g/dL / Pro: 5.6 g/dL / ALK PHOS: 199 U/L / ALT: 78 U/L / AST: 78 U/L / GGT: x         Lipase, Serum: 23 U/L (01-03-23 @ 05:45)    RADIOLOGY:  ACC: [id number] EXAM:  CT ABDOMEN AND PELVIS                          PROCEDURE DATE:  01/03/2023      INTERPRETATION:  CLINICAL INFORMATION: Sepsis, abdominal pain. Reported   hepatic malignancy/cirrhosis.    COMPARISON: None.    CONTRAST/COMPLICATIONS:  IV Contrast: NONE  Oral Contrast: NONE  Complications: None reported at time of study completion    PROCEDURE:  CT of the Abdomen and Pelvis was performed.  Sagittal and coronal reformats were performed.    FINDINGS:  LOWER CHEST: Innumerable masses are visualized within the lung parenchyma   measuring up to 2.5 cm in size consistent with lung parenchymal   metastatic neoplastic disease.    LIVER: Hepatomegaly. Heterogeneous attenuation of the hepatic parenchyma   identified with innumerable masses within the hepatic parenchyma   measuring up to 7.5 cm consistent with reported hepatic malignancy.  BILE DUCTS: No intrahepatic or extrahepatic biliary ductal dilatation   identified.  GALLBLADDER: No gross gallstones or gallbladder wall thickening   identified.  SPLEEN: Splenomegaly.  PANCREAS: Within normal limits.  ADRENALS: Soft tissue nodularity measuring 1.9 cm is identified within   the right suprarenal region and a right adrenal nodule cannot be   excluded. The left adrenal appears unremarkable.  KIDNEYS/URETERS: No hydronephrosis. No renal calculi. No space-occupying   lesions of the renal parenchyma identified.    BLADDER: Minimally distended, precluding assessment.  REPRODUCTIVE ORGANS: The prostate appears unremarkable.    BOWEL: No evidence for mechanical bowel obstruction. Pancolonic wall   thickening is suggested raising suspicion for colitis. No evidence for   acute appendicitis.  PERITONEUM: Indwelling peritoneal change catheter. A small volume of   ascites isidentified with fluid noted within the right inguinal canal.   Diffuse stranding of the mesentery noted which may be related to the   presence of ascites; a 2.2 cm soft tissue nodule is identified posterior   to the cecum and a mesenteric neoplastic deposit cannot be excluded. No   free intraperitoneal air.  VESSELS: Recanalized paraumbilical vein and perisplenic varices likely   related to portal hypertension.  RETROPERITONEUM/LYMPH NODES: No lymphadenopathy.  ABDOMINAL WALL: Within normal limits.  BONES: A region of circumscribed lucency is identified within the   anterior/inferior aspect of the L4 vertebral body, a metastatic   neoplastic deposit cannot be excluded.    IMPRESSION:  1. Pancolonic wall thickening is suggested raising suspicion for colitis.  2. Innumerable masses are visualized within the lung parenchyma measuring   up to 2.5 cm in size consistent with lung parenchymal metastatic   neoplastic disease.  3. Hepatomegaly. Heterogeneous attenuation of the hepatic parenchyma   identified with innumerable masses within the hepatic parenchyma   measuring up to 7.5 cm consistent with reported hepatic malignancy.   Splenomegaly. Portal hypertension.  4. Indwelling peritoneal change catheter. A small volume of ascites is   identified with fluid noted within the right inguinal canal. Diffuse   stranding of the mesentery noted which may be related to the presence of   ascites; a 2.2 cm soft tissue nodule is identified posterior to the cecum   and a mesenteric neoplastic deposit cannot be excluded.  5. A region of circumscribed lucency is identified within the   anterior/inferior aspect of the L4 vertebral body, a metastatic   neoplastic deposit cannot be excluded.

## 2023-01-04 NOTE — PROGRESS NOTE ADULT - SUBJECTIVE AND OBJECTIVE BOX
Progress: pt more alert today, oriented to self, place , family names.    Present Symptoms:   Dyspnea: no  Nausea/Vomiting:   Anxiety:    Depressed Mood:   Fatigue: yes  Loss of appetite: yes  Pain:    no,  not now                location:   Review of Systems: Unable to obtain due to poor mentation    MEDICATIONS  (STANDING):  ascorbic acid 500 milliGRAM(s) Oral daily  cholecalciferol 1000 Unit(s) Oral daily  dexAMETHasone     Tablet 2 milliGRAM(s) Oral two times a day  dextrose 50% Injectable 25 Gram(s) IV Push once  dextrose 50% Injectable 12.5 Gram(s) IV Push once  dextrose 50% Injectable 25 Gram(s) IV Push once  dextrose Oral Gel 15 Gram(s) Oral once  enoxaparin Injectable 40 milliGRAM(s) SubCutaneous every 24 hours  furosemide    Tablet 20 milliGRAM(s) Oral daily  glucagon  Injectable 1 milliGRAM(s) IntraMuscular once  lactulose Retention Enema 200 Gram(s) Rectal three times a day  pantoprazole    Tablet 40 milliGRAM(s) Oral before breakfast  piperacillin/tazobactam IVPB.. 3.375 Gram(s) IV Intermittent every 8 hours  sodium chloride 0.9% Bolus 500 milliLiter(s) IV Bolus once  spironolactone 25 milliGRAM(s) Oral daily  zinc sulfate 220 milliGRAM(s) Oral daily    MEDICATIONS  (PRN):  HYDROmorphone   Tablet 4 milliGRAM(s) Oral every 4 hours PRN Severe Pain (7 - 10)      PHYSICAL EXAM:  Vital Signs Last 24 Hrs  T(C): 37 (2023 02:00), Max: 37 (2023 02:00)  T(F): 98.6 (2023 02:00), Max: 98.6 (2023 02:00)  HR: 107 (2023 02:00) (103 - 112)  BP: 116/79 (2023 02:00) (112/86 - 129/-)  BP(mean): --  RR: 14 (2023 02:00) (14 - 16)  SpO2: 97% (2023 19:00) (97% - 98%)    Parameters below as of 2023 02:00  Patient On (Oxygen Delivery Method): room air      General: alert  oriented x 2 -3 , speaks mostly Dive[place] , able to understand some english  HEENT: n/c, a/t, + temp wasting . lips mouth dry     Lungs: resp non labored , breathing comfortable   CV: tachy    GI: abd dist , n/t     : normal    Musculoskeletal: cachectic, weakened, solis's    Skin: pale, w/d     Neuro: more awake, alert, today , speaks few words in english, oriented to self, place, year   Oral intake ability:  oral feeding- minimal   Diet: soft, as damon         LABS:                          13.1   12.84 )-----------( 139      ( 2023 07:00 )             40.7         133<L>  |  101  |  13  ----------------------------<  104<H>  4.0   |  20<L>  |  0.40<L>    Ca    8.8      2023 07:00    TPro  5.7<L>  /  Alb  2.2<L>  /  TBili  2.3<H>  /  DBili  x   /  AST  96<H>  /  ALT  84<H>  /  AlkPhos  206<H>      Urinalysis Basic - ( 2023 06:55 )    Color: Yellow / Appearance: Clear / S.015 / pH: x  Gluc: x / Ketone: Trace  / Bili: Negative / Urobili: 1   Blood: x / Protein: 30 mg/dL / Nitrite: Negative   Leuk Esterase: Trace / RBC: 0-4 /HPF / WBC 0-2 /HPF   Sq Epi: x / Non Sq Epi: Neg.-Few / Bacteria: Trace /HPF        RADIOLOGY & ADDITIONAL STUDIES: < from: CT Abdomen and Pelvis No Cont (23 @ 07:58) >  ACC: [id number] EXAM:  CT ABDOMEN AND PELVIS                          PROCEDURE DATE:  2023        < from: CT Abdomen and Pelvis No Cont (23 @ 07:58) >  IMPRESSION:  1. Pancolonic wall thickening is suggested raising suspicion for colitis.  2. Innumerable masses are visualized within the lung parenchyma measuring   up to 2.5 cm in size consistent with lung parenchymal metastatic   neoplastic disease.  3. Hepatomegaly. Heterogeneous attenuation of the hepatic parenchyma   identified with innumerable masses within the hepatic parenchyma   measuring up to 7.5 cm consistent with reported hepatic malignancy.   Splenomegaly. Portal hypertension.  4. Indwelling peritoneal change catheter. A small volume of ascites is   identified with fluid noted within the right inguinal canal. Diffuse   stranding of the mesentery noted which may be related to the presence of   ascites; a 2.2 cm soft tissue nodule is identified posterior to the cecum   and a mesenteric neoplastic deposit cannot be excluded.  5. A region of circumscribed lucency is identified within the   anterior/inferior aspect of the L4 vertebral body, a metastatic   neoplastic deposit cannot be excluded.          ADVANCE DIRECTIVES: No  Full Code   Advanced Care Planning discussion total time spent:

## 2023-01-04 NOTE — PROGRESS NOTE ADULT - SUBJECTIVE AND OBJECTIVE BOX
Patient is a 37y old  Male who presents with a chief complaint of Hypoglycemia, AMS (2023 16:20)      INTERVAL HPI/OVERNIGHT EVENTS: Patient seen and examined at bedside. No overnight events.  Mental status significantly improved from yesterday, A&Ox3. Denies current pain. Had minimal breakfast. Feels better with IVF.     MEDICATIONS  (STANDING):  ascorbic acid 500 milliGRAM(s) Oral daily  cholecalciferol 1000 Unit(s) Oral daily  dexAMETHasone     Tablet 2 milliGRAM(s) Oral two times a day  dextrose 5% + sodium chloride 0.9%. 1000 milliLiter(s) (150 mL/Hr) IV Continuous <Continuous>  dextrose 50% Injectable 25 Gram(s) IV Push once  dextrose 50% Injectable 12.5 Gram(s) IV Push once  dextrose 50% Injectable 25 Gram(s) IV Push once  dextrose Oral Gel 15 Gram(s) Oral once  enoxaparin Injectable 40 milliGRAM(s) SubCutaneous every 24 hours  furosemide    Tablet 20 milliGRAM(s) Oral daily  glucagon  Injectable 1 milliGRAM(s) IntraMuscular once  pantoprazole    Tablet 40 milliGRAM(s) Oral before breakfast  piperacillin/tazobactam IVPB.. 3.375 Gram(s) IV Intermittent every 8 hours  sodium chloride 0.9% Bolus 500 milliLiter(s) IV Bolus once  spironolactone 25 milliGRAM(s) Oral daily  zinc sulfate 220 milliGRAM(s) Oral daily    MEDICATIONS  (PRN):  HYDROmorphone   Tablet 4 milliGRAM(s) Oral every 4 hours PRN Severe Pain (7 - 10)      Allergies    No Known Allergies    Intolerances        REVIEW OF SYSTEMS:  CONSTITUTIONAL: No fever or chills  CARDIOVASCULAR: No chest pain, palpitations    Vital Signs Last 24 Hrs  T(C): 37 (2023 02:00), Max: 37 (2023 02:00)  T(F): 98.6 (2023 02:00), Max: 98.6 (2023 02:00)  HR: 107 (2023 02:00) (103 - 112)  BP: 116/79 (2023 02:00) (112/86 - 129/-)  BP(mean): --  RR: 14 (2023 02:00) (14 - 16)  SpO2: 97% (2023 19:00) (97% - 98%)    Parameters below as of 2023 02:00  Patient On (Oxygen Delivery Method): room air      I&O's Summary    2023 07:01  -  2023 07:00  --------------------------------------------------------  IN: 0 mL / OUT: 400 mL / NET: -400 mL      BMI (kg/m2): 20.2 (23 @ 05:05)    PHYSICAL EXAM:  GENERAL: chronically ill appearing young male in NAD, frail, thin, cachectic   HEENT:  AT/NC, anicteric, dry mucous membranes, EOMI, PERRL, no lid-lag, conjunctiva and sclera clear  CHEST/LUNG: diminished breath sounds at bases, overall CTA b/l, no wheezes normal respiratory effort, no intercostal retractions  HEART:  RRR, S1, S2, no murmurs; trace pitting edema  ABDOMEN:  BS+, diffuse distention, mild tenderness to palpation in all quadrants, abd catheter in place   MSK/EXTREMITIES: palpable peripheral pulses, no clubbing or cyanosis  NERVOUS SYSTEM:  A&Ox3, follows simple commands, grossly moves all extremities   PSYCH: flat affect, Alert & Awake; fair-poor judgement      LABS: Personally reviewed                        13.1   12.84 )-----------( 139      ( 2023 07:00 )             40.7     -    133  |  101  |  13  ----------------------------<  104  4.0   |  20  |  0.40    Ca    8.8      2023 07:00    TPro  5.7  /  Alb  2.2  /  TBili  2.3  /  DBili  x   /  AST  96  /  ALT  84  /  AlkPhos  206        Lipase, Serum: 23 U/L (23 @ 05:45)      PT/INR - ( 2023 05:45 )   PT: 31.7 sec;   INR: 2.71 ratio         PTT - ( 2023 05:45 )  PTT:40.4 sec  Lactate, Blood: 5.0 mmol/L ( @ 21:00)  Lactate, Blood: 5.1 mmol/L ( @ 17:25)  Lactate, Blood: 5.6 mmol/L ( @ 12:30)  Lactate, Blood: 4.5 mmol/L ( @ 11:23)  Lactate, Blood: 5.1 mmol/L ( @ 07:18)                        POCT Blood Glucose.: 91 mg/dL (2023 06:05)  POCT Blood Glucose.: 83 mg/dL (2023 04:27)  POCT Blood Glucose.: 106 mg/dL (2023 02:10)  POCT Blood Glucose.: 134 mg/dL (2023 20:33)  POCT Blood Glucose.: 160 mg/dL (2023 14:41)      Urinalysis Basic - ( 2023 06:55 )    Color: Yellow / Appearance: Clear / S.015 / pH: x  Gluc: x / Ketone: Trace  / Bili: Negative / Urobili: 1   Blood: x / Protein: 30 mg/dL / Nitrite: Negative   Leuk Esterase: Trace / RBC: 0-4 /HPF / WBC 0-2 /HPF   Sq Epi: x / Non Sq Epi: Neg.-Few / Bacteria: Trace /HPF                RADIOLOGY & ADDITIONAL TESTS: Personally reviewed.   < from: CT Abdomen and Pelvis No Cont (23 @ 07:58) >  ACC: [id number] EXAM:  CT ABDOMEN AND PELVIS                          PROCEDURE DATE:  2023          INTERPRETATION:  CLINICAL INFORMATION: Sepsis, abdominal pain. Reported   hepatic malignancy/cirrhosis.    COMPARISON: None.    CONTRAST/COMPLICATIONS:  IV Contrast: NONE  Oral Contrast: NONE  Complications: None reported at time of study completion    PROCEDURE:  CT of the Abdomen and Pelvis was performed.  Sagittal and coronal reformats were performed.    FINDINGS:  LOWER CHEST: Innumerable masses are visualized within the lung parenchyma   measuring up to 2.5 cm in size consistent with lung parenchymal   metastatic neoplastic disease.    LIVER: Hepatomegaly. Heterogeneous attenuation of the hepatic parenchyma   identified with innumerable masses within the hepatic parenchyma   measuring up to 7.5 cm consistent with reported hepatic malignancy.  BILE DUCTS: No intrahepatic or extrahepatic biliary ductal dilatation   identified.  GALLBLADDER: No gross gallstones or gallbladder wall thickening   identified.  SPLEEN: Splenomegaly.  PANCREAS: Within normal limits.  ADRENALS: Soft tissue nodularity measuring 1.9 cm is identified within   the right suprarenal region and a right adrenal nodule cannot be   excluded. The left adrenal appears unremarkable.  KIDNEYS/URETERS: No hydronephrosis. No renal calculi. No space-occupying   lesions of the renal parenchyma identified.    BLADDER: Minimally distended, precluding assessment.  REPRODUCTIVE ORGANS: The prostate appears unremarkable.    BOWEL: No evidence for mechanical bowel obstruction. Pancolonic wall   thickening is suggested raising suspicion for colitis. No evidence for   acute appendicitis.  PERITONEUM: Indwelling peritoneal change catheter. A small volume of   ascites isidentified with fluid noted within the right inguinal canal.   Diffuse stranding of the mesentery noted which may be related to the   presence of ascites; a 2.2 cm soft tissue nodule is identified posterior   to the cecum and a mesenteric neoplastic deposit cannot be excluded. No   free intraperitoneal air.  VESSELS: Recanalized paraumbilical vein and perisplenic varices likely   related to portal hypertension.  RETROPERITONEUM/LYMPH NODES: No lymphadenopathy.  ABDOMINAL WALL: Within normal limits.  BONES: A region of circumscribed lucency is identified within the   anterior/inferior aspect of the L4 vertebral body, a metastatic   neoplastic deposit cannot be excluded.    IMPRESSION:  1. Pancolonic wall thickening is suggested raising suspicion for colitis.  2. Innumerable masses are visualized within the lung parenchyma measuring   up to 2.5 cm in size consistent with lung parenchymal metastatic   neoplastic disease.  3. Hepatomegaly. Heterogeneous attenuation of the hepatic parenchyma   identified with innumerable masses within the hepatic parenchyma   measuring up to 7.5 cm consistent with reported hepatic malignancy.   Splenomegaly. Portal hypertension.  4. Indwelling peritoneal change catheter. A small volume of ascites is   identified with fluid noted within the right inguinal canal. Diffuse   stranding of the mesentery noted which may be related to the presence of   ascites; a 2.2 cm soft tissue nodule is identified posterior to the cecum   and a mesenteric neoplastic deposit cannot be excluded.  5. A region of circumscribed lucency is identified within the   anterior/inferior aspect of the L4 vertebral body, a metastatic   neoplastic deposit cannot be excluded.          --- End of Report ---            DANIEL HARE MD; Attending Radiologist  This document has been electronically signed. Nick  3 2023  8:53AM    < end of copied text >      Consultant(s) Notes Reviewed:  [x] YES  [ ] NO - Discussed with Kendra RUTHERFORD pallative, patient more alert than yesterday, to discuss with family continued GOC with eventual dc either to inpatient hospice v home with home hospice    Discussed with TELMA/REDDY, RN

## 2023-01-04 NOTE — PROGRESS NOTE ADULT - ASSESSMENT
37M with recently diagnosed metastatic liver cancer to lung about 2 months ago s/p recent abd Pleurx getting drained 3x/week presents with AMS and hypoglycemia. Admitted for metabolic encephalopathy, sepsis, and viral PNA from COVID.     #Metabolic encephalopathy  #Metastatic liver Ca  #Hyponatremia  #Transaminitis  #Elevated total bilirubin   Likely hepatic encephalopathy in setting of metastatic liver cancer  Sepsis ruled out, Leukocytosis likely in setting of chronic steroid use.   -Will continue IV Zosyn for now pending blood cultures x2.   -GI Dr Moss consulted, recommendations appreciated. Unlikely SBP, follow up cell -count from pleux fluid  -CT abd/pelvis performed  -Discussed with Dr Rouse, heme/conc, plan to obtain records from [facility], continue hospice   -Resume home medications: Spironolactone, Lasix  -Will stop IVF, PO hydration encouraged  -Continue Lactulose enema TID  -Palliative care consulted, recommendations appreciated. Currently on home hospice. ?inpatient hospice v home     #Hypoglycemia  Likely due to poor PO intake in setting of encephalopathy  -Mental status significantly improved  -PO hydration encouraged  -Continue hypoglycemia protocol   -Resume Dexamethasone PO  -Hold IVF    #Viral PNA 2/2 COVID  -Not a candidate for Remdesivir given transaminitis  -Spo2 appropriate on RA, monitor respiratory status  -Continue multivitamin, Vitamin D, Vitamin C    #Severe protein calorie malnutrition  Likely related to above  -Continue multivitamin  -Nutrition evaluation    #Prophylactic Measure  -DVT ppx: Lovenox    Discussed with brother Kvng at bedside aware and in agreement with above. Continue GOC to determine ultimate dispo plans.     Wife Kendra [phone]

## 2023-01-04 NOTE — CONSULT NOTE ADULT - SUBJECTIVE AND OBJECTIVE BOX
HPI:   Patient is a 37y male with hepatitis b associated cirrhosis with hepatic cancer with extensive mets to lung diagnosed in November at [facility]. Deemed not a candidate for chemo, had a drainage catheter placed for symptomatic ascites and was sent out to hospice. He came here yesterday for altered mental status, found to have covid and hypoglycemia. He has on ct some colonic thickening but he is not with any diarrhea. He has ongoing chronic abdomen discomfort. He is not hypoxic, no diarrhea, no vomiting, no ha.     REVIEW OF SYSTEMS:  All other review of systems negative (Comprehensive ROS)    PAST MEDICAL & SURGICAL HISTORY:  Liver cancer      H/O cirrhosis          Allergies    No Known Allergies    Intolerances        Antimicrobials Day #  :2  piperacillin/tazobactam IVPB.. 3.375 Gram(s) IV Intermittent every 8 hours    Other Medications:  ascorbic acid 500 milliGRAM(s) Oral daily  cholecalciferol 1000 Unit(s) Oral daily  dexAMETHasone     Tablet 2 milliGRAM(s) Oral two times a day  dextrose 50% Injectable 25 Gram(s) IV Push once  dextrose 50% Injectable 12.5 Gram(s) IV Push once  dextrose 50% Injectable 25 Gram(s) IV Push once  dextrose Oral Gel 15 Gram(s) Oral once  enoxaparin Injectable 40 milliGRAM(s) SubCutaneous every 24 hours  furosemide    Tablet 20 milliGRAM(s) Oral daily  glucagon  Injectable 1 milliGRAM(s) IntraMuscular once  HYDROmorphone   Tablet 4 milliGRAM(s) Oral every 4 hours PRN  lactulose Retention Enema 200 Gram(s) Rectal three times a day  melatonin 6 milliGRAM(s) Oral at bedtime  pantoprazole    Tablet 40 milliGRAM(s) Oral before breakfast  sodium chloride 0.9% Bolus 500 milliLiter(s) IV Bolus once  spironolactone 25 milliGRAM(s) Oral daily  zinc sulfate 220 milliGRAM(s) Oral daily      FAMILY HISTORY:      SOCIAL HISTORY:  Smoking: [ ]Yes [ ]xNo  ETOH: [ ]Yes [ x]No  Drug Use: [ ]Yes [ x]No   [ ] Single[x ]    T(F): 97.9 (23 @ 16:42), Max: 98.6 (23 @ 02:00)  HR: 123 (23 @ 16:42)  BP: 129/89 (23 @ 16:42)  RR: 16 (23 @ 16:42)  SpO2: 97% (23 @ 16:42)  Wt(kg): --    PHYSICAL EXAM:  General: alert, no acute distress, cachectic  Eyes:  icteric, no conjunctival injection, no discharge  Oropharynx: no lesions or injection 	  Neck: supple, without adenopathy  Lungs: clear to auscultation  Heart: regular rate and rhythm; no murmur, rubs or gallops  Abdomen: soft, rlq catheter , mild fluid distended, nontender, without mass or organomegaly  Skin: no lesions  Extremities: no clubbing, cyanosis, or edema  Neurologic: alert,  moves all extremities    LAB RESULTS:                        13.1   12.84 )-----------( 139      ( 2023 07:00 )             40.7         133<L>  |  101  |  13  ----------------------------<  104<H>  4.0   |  20<L>  |  0.40<L>    Ca    8.8      2023 07:00    TPro  5.7<L>  /  Alb  2.2<L>  /  TBili  2.3<H>  /  DBili  x   /  AST  96<H>  /  ALT  84<H>  /  AlkPhos  206<H>      LIVER FUNCTIONS - ( 2023 07:00 )  Alb: 2.2 g/dL / Pro: 5.7 g/dL / ALK PHOS: 206 U/L / ALT: 84 U/L / AST: 96 U/L / GGT: x           Urinalysis Basic - ( 2023 06:55 )    Color: Yellow / Appearance: Clear / S.015 / pH: x  Gluc: x / Ketone: Trace  / Bili: Negative / Urobili: 1   Blood: x / Protein: 30 mg/dL / Nitrite: Negative   Leuk Esterase: Trace / RBC: 0-4 /HPF / WBC 0-2 /HPF   Sq Epi: x / Non Sq Epi: Neg.-Few / Bacteria: Trace /HPF        MICROBIOLOGY:  RECENT CULTURES:   @ 07:18 .Blood Blood     No growth to date.            RADIOLOGY REVIEWED:    < from: CT Abdomen and Pelvis No Cont (23 @ 07:58) >  IMPRESSION:  1. Pancolonic wall thickening is suggested raising suspicion for colitis.  2. Innumerable masses are visualized within the lung parenchyma measuring   up to 2.5 cm in size consistent with lung parenchymal metastatic   neoplastic disease.  3. Hepatomegaly. Heterogeneous attenuation of the hepatic parenchyma   identified with innumerable masses within the hepatic parenchyma   measuring up to 7.5 cm consistent with reported hepatic malignancy.   Splenomegaly. Portal hypertension.  4. Indwelling peritoneal change catheter. A small volume of ascites is   identified with fluid noted within the right inguinal canal. Diffuse   stranding of the mesentery noted which may be related to the presence of   ascites; a 2.2 cm soft tissue nodule is identified posterior to the cecum   and a mesenteric neoplastic deposit cannot be excluded.  5. A region of circumscribed lucency is identified within the   anterior/inferior aspect of the L4 vertebral body, a metastatic   neoplastic deposit cannot be excluded.      < end of copied text >        Impression:  Young man with hcc and extensive mets, cirrhosis with liver failure, not candidate for chemo, was in hospice, came in for encephalopathy and found hypoglycemic. concern raised for infection since wbc high, has covid but not likely cause of the leukocytosis. Has colon thickening but not clinically with colitis, no diarrhea. Has ascites with catheter but small volume and not tender. Suspect leukocytosis just reactive to his tumor, some dehydration, some hypoglycemia. and steroids. Suspect hypoglycemic from liver failure, tumor with high metabolic rate. Covid without hypoxia and want to avoid rdv and paxlovid with liver failure and no ID need for steroids at higher than baseline dose since not hypoxic  I am not convinced patient has a bacterial infection      Recommendations:  will continue zosyn  if cultures remain negative will likely stop zosyn tomorrow  monitor sats  hospice

## 2023-01-04 NOTE — PROGRESS NOTE ADULT - ASSESSMENT
37 Tajik speaking male  recently dx metastatic liver cancer to lung about 2 months ago s/p recent abd Pleurx placed and getting drained 3x/week for the past 2 weeks (last drained yesterday) Home hospes nurse sent him to ER with AMS with hypoglycemia, elevated ammonia levels,   sepsis and +COVID.

## 2023-01-04 NOTE — PROGRESS NOTE ADULT - PROBLEM SELECTOR PLAN 2
Ana says he is POA.   He was on Home hospice care since discharge   Goals care to be addressed. He was on Home hospice care since discharge   Goals of care to be addressed.

## 2023-01-04 NOTE — PROGRESS NOTE ADULT - PROBLEM SELECTOR PLAN 1
Acute change in mental status un like of hepatic encephalopathy   Empiric antibiotics   Unlikely of SBP normal cell count from Pleurx fluid    Continue Lactulose enema   Supportive care Acute change in mental status unlikely to be due to hepatic encephalopathy   Empiric antibiotics   Unlikely SBP as normal cell count from Pleurx fluid    Continue Lactulose enemas  Supportive care

## 2023-01-04 NOTE — PROGRESS NOTE ADULT - ASSESSMENT
A/P 37 Indonesian speaking male hx of recently dx metastatic liver cancer to lung about 2 months ago s/p recent abd Pleurx placed and getting drained 3x/week for the past 2 weeks  pw from home w/ AMS with hypoglycemia, elevated ammonia levels,   sepsis and +COVID.       AMS with hepatic encephalopathy in setting of metastatic liver cancer- improving today      leukocytosis  Check CTAP.  IV Ceftriaxone  GI consult - appreciated   lactulose enemas (failed dysphagia in ED)  Likely hepatic encephalopathy in setting of metastatic liver cancer    Sepsis ruled out, Leukocytosis likely in setting of chronic steroid use.   - IV Zosyn for now , pending blood cultures x2.   -GI Dr Moss consulted, recommendations appreciated. Unlikely SBP, follow up cell -count from pleux fluid  -CT abd/pelvis performed  -Heme/Onc -  Dr Rouse,, plan to obtain records from [facility], suggests continue hospice   Hypoglycemia  on dexamethasone.   switched back to dexa     Metastatic liver cancer  brother to bring in info regarding pt's care providers?   Dx approx 2 months ago at [facility]   pt currently on Home Hospice services w/ [facility]  - confirmed w/ GS   -  E[place] - [phone]       COVID positive  not candidate for Remdesivir given LFTs/liver cancer  supportive care.       Palliative ;   as a f/u , case d/w med team Dr Chacon today, chart reviewed. Pt seen bedside. Pt more awake, alert, today , can speak few words in English but mostly Indonesian speaking.    A/P 37 Belgian speaking male hx of recently dx metastatic liver cancer to lung about 2 months ago s/p recent abd Pleurx placed and getting drained 3x/week for the past 2 weeks , has been home w/ hospice services,  pw from home w/ AMS with hypoglycemia, elevated ammonia levels,   sepsis and +COVID.       AMS with hepatic encephalopathy in setting of metastatic liver cancer- improving today      leukocytosis  Check CTAP.  IV Ceftriaxone  GI consult - appreciated   lactulose enemas (failed dysphagia in ED)  Likely hepatic encephalopathy in setting of metastatic liver cancer    Sepsis ruled out, Leukocytosis likely in setting of chronic steroid use.   - IV Zosyn for now , pending blood cultures x2.   -GI Dr Moss consulted, recommendations appreciated. Unlikely SBP, follow up cell -count from pleux fluid  -CT abd/pelvis performed  -Heme/Onc -  Dr Rouse,, plan to obtain records from [facility], suggests continue hospice   Hypoglycemia  on dexamethasone.   switched back to dexa po    Metastatic liver cancer  brother to bring in info regarding pt's care providers? from [facility]   Dx approx 2 months ago at [facility]   [facility]  - confirmed w/ [facility]     ***-  at [facility]  Effie - [phone] ***      COVID positive  not candidate for Remdesivir given LFTs/liver cancer  supportive care.       Palliative ;   as a f/u , case d/w med team Dr Chacon today, chart reviewed. Pt seen bedside. Pt more awake, alert, today , can speak and understand a few words in English but mostly Belgian speaking.   Able to understand some conversation, denies pain presently. Asking for something to help him sleep.   Pt stated he is aware he is " very sick", "cancer". Stated" im waiting to die."  I asked about his wife, pt says her name is Kendra and had her number on his phone-  [phone]- I called and no answer, unable to leave any message  voice Mbox not set up.  I also called brother Kvng as a follow up , who I met with yesterday, pt agrees for me to speak with his brother , I reached out to cont our [facility] discussion , [phone] - also no answer, unable to leave message , voice Mbox not set up.  If brother comes to visit again , can have continued  discussion on goc , w/ pt and brother together .   Discussed w/ med team today , if pt remains full code, he cannot be considered for inpatient hospice services.    In that case , when pt  stable , per med team, he should return to home and have home hospice services re- instated with [facility] .

## 2023-01-04 NOTE — CONSULT NOTE ADULT - PROBLEM SELECTOR RECOMMENDATION 9
Patient with reported primary liver carcinoma, though no pathology available for review.  Called [facility] pathology department to retrieve records as well as radiology department for initial images.  Due to patient's comorbidities and rapid progression of disease he was deemed appropriate for hospice.  Family aware of poor prognosis.  No plans for systemic therapy discussed.  Patient is currently encephalopathic, with acute COVID infection, possibly septic.  To be treated supportively and once stabilized to be reevaluated for hospice.  Case discussed with  ( hospitalist)

## 2023-01-05 NOTE — DIETITIAN INITIAL EVALUATION ADULT - ORAL INTAKE PTA/DIET HISTORY
Patient noted confused , hepatic encephalopathy in setting of metastatic liver cancer, as per RN patient noted with poor po , family was noted bring food from home ,

## 2023-01-05 NOTE — PATIENT PROFILE ADULT - FALL HARM RISK - HARM RISK INTERVENTIONS
Assistance with ambulation/Assistance OOB with selected safe patient handling equipment/Communicate Risk of Fall with Harm to all staff/Discuss with provider need for PT consult/Monitor gait and stability/Reinforce activity limits and safety measures with patient and family/Tailored Fall Risk Interventions/Use of alarms - bed, chair and/or voice tab/Visual Cue: Yellow wristband and red socks/Bed in lowest position, wheels locked, appropriate side rails in place/Call bell, personal items and telephone in reach/Instruct patient to call for assistance before getting out of bed or chair/Non-slip footwear when patient is out of bed/[place] to call system/Physically safe environment - no spills, clutter or unnecessary equipment/Purposeful Proactive Rounding/Room/bathroom lighting operational, light cord in reach

## 2023-01-05 NOTE — PROVIDER CONTACT NOTE (HYPOGLYCEMIA EVENT) - NS PROVIDER CONTACT RECOMMEND-HYPO
Pt was started on fluids D5NS @ 70 cc/hr Pt was started on fluids D5NS @ 70 cc/hr. Unable to enter accepted meter d/t blank screen on the monitor. Monitor restarted and BS assessed with 15 min.

## 2023-01-05 NOTE — DIETITIAN INITIAL EVALUATION ADULT - ETIOLOGY
related to metastatic liver disease to lung  related to suboptimal nutrient intake in the setting of chronic disease

## 2023-01-05 NOTE — DIETITIAN INITIAL EVALUATION ADULT - PERTINENT LABORATORY DATA
01-05    134<L>  |  x   |  x   ----------------------------<  x   x    |  x   |  0.55    Ca    8.8      04 Jan 2023 07:00    TPro  x   /  Alb  x   /  TBili  3.3<H>  /  DBili  x   /  AST  x   /  ALT  x   /  AlkPhos  x   01-05  POCT Blood Glucose.: 112 mg/dL (01-05-23 @ 12:03)  A1C with Estimated Average Glucose Result: 4.7 % (01-04-23 @ 11:17)

## 2023-01-05 NOTE — PATIENT PROFILE ADULT - FUNCTIONAL ASSESSMENT - BASIC MOBILITY 6.
2-calculated by average/Not able to assess (calculate score using [facility] averaging method)

## 2023-01-05 NOTE — PROGRESS NOTE ADULT - SUBJECTIVE AND OBJECTIVE BOX
NEUROLOGY CLINIC RETURN VISIT      CC: myasthenia gravis      ASSESSMENT AND PLAN:    1. MYASTHENIA GRAVIS, w/o acute exacerbation, responsive to mestinon and IVIG    2. Dysautonomia: Due to diabetes mellitus type 2; vit def's. 7/2015 auto lab testing with postganglionic, cardiovagal impairment; unable to evaluate BP response to VM, no OH or POTS on auto lab tilt.    3. Diabetic autonomic neuropathy associated with type 2 diabetes mellitus    4. Diabetic peripheral neuropathy associated with type 2 diabetes mellitus    5. SOB (shortness of breath) likely multifactorial   6. Falling with above    7. Abnormal antinuclear antibody titer    8. SS-B antibody positive    9. Vitamin B6 deficiency, potential contributing factor for neuropathy    10. Vitamin B1 deficiency, as above   11. Vitamin B12 deficiency: intracellular with low NL B12 level and elevated MMA, as above   12. Undifferentiated connective tissue disease per rheumatology, potential contributing factor for sxs   13. MGUS (monoclonal gammopathy of unknown significance), followed by hematology       Neuro exam stable today. Reviewed plan with patient:    1. Continue cellcept at 1500 mg bid. Weekly IVIG.    2. Continue to limit exposure to infections as much as possible.  3. Follow recommendations of Dr Campbell re: kidney.  4.  Make follow up with her hematologist, Dr Gil.  5. Keep upcoming follow up appt with rheumatology.  6. Contacted Dr Rivera's office on behalf of patient.  Recommend follow up SNIFF and PFT. Will then make further recommendations regarding diaphragmatic weakness.  7. RTC in 3-4 months        Findings and recommendations were discussed with the patient.  Her questions were answered.  Provided patient with After Visit Summary.      HPI: Patient is a 55 year old female returning for follow up of clinic visit, for MG, diabetic autonomic neuropathy, diabetic polyneuropathy  I have reviewed the patient's medical record in detail.     Mestinon 90 q  Patient is a 37y old  Male who presents with a chief complaint of Hypoglycemia     (2023 13:14)     ID #[id number] utilized     Patient seen and examined at bedside. Overnight, patient was hypoglycemic. Not eating much, however. No pain or any other complaints on exam currently.     ALLERGIES:  No Known Allergies    MEDICATIONS  (STANDING):  ascorbic acid 500 milliGRAM(s) Oral daily  cholecalciferol 1000 Unit(s) Oral daily  dexAMETHasone     Tablet 2 milliGRAM(s) Oral two times a day  dextrose 50% Injectable 25 Gram(s) IV Push once  dextrose 50% Injectable 12.5 Gram(s) IV Push once  dextrose 50% Injectable 25 Gram(s) IV Push once  dextrose Oral Gel 15 Gram(s) Oral once  enoxaparin Injectable 40 milliGRAM(s) SubCutaneous every 24 hours  furosemide    Tablet 20 milliGRAM(s) Oral daily  glucagon  Injectable 1 milliGRAM(s) IntraMuscular once  lactulose Retention Enema 200 Gram(s) Rectal three times a day  melatonin 6 milliGRAM(s) Oral at bedtime  pantoprazole    Tablet 40 milliGRAM(s) Oral before breakfast  piperacillin/tazobactam IVPB.. 3.375 Gram(s) IV Intermittent every 8 hours  sodium chloride 0.9% Bolus 500 milliLiter(s) IV Bolus once  spironolactone 25 milliGRAM(s) Oral daily  zinc sulfate 220 milliGRAM(s) Oral daily    MEDICATIONS  (PRN):  HYDROmorphone   Tablet 4 milliGRAM(s) Oral every 4 hours PRN Severe Pain (7 - 10)    Vital Signs Last 24 Hrs  T(F): 98 (2023 12:03), Max: 98 (2023 12:03)  HR: 125 (2023 12:03) (105 - 125)  BP: 129/93 (2023 12:03) (118/81 - 129/93)  RR: 15 (2023 12:03) (15 - 16)  SpO2: 96% (2023 12:03) (96% - 98%)  I&O's Summary    PHYSICAL EXAM:  General: cachectic, A/O x 3  ENT: No gross hearing impairment, Moist mucous membranes, + thrush  Neck: Supple, No JVD  Lungs: Clear to auscultation bilaterally, good air entry, non-labored breathing  Cardio: tachycardic, S1/S2, No murmur  Abdomen: Soft, Nontender, Nondistended; Bowel sounds present  Extremities: No calf tenderness, No cyanosis, No pitting edema  Psych: Appropriate mood and affect    LABS:                        13.1   12.84 )-----------( 139      ( 2023 07:00 )             40.7         134  |  x   |  x   ----------------------------<  x   x    |  x   |  0.55    Ca    8.8      2023 07:00    TPro  x   /  Alb  x   /  TBili  3.3  /  DBili  x   /  AST  x   /  ALT  x   /  AlkPhos  x         Lipase, Serum: 23 U/L (23 @ 05:45)      PT/INR - ( 2023 05:05 )   PT: 28.6 sec;   INR: 2.44 ratio         PTT - ( 2023 05:45 )  PTT:40.4 sec  Lactate, Blood: 2.5 mmol/L ( @ 05:05)  Lactate, Blood: 4.5 mmol/L ( @ 00:10)  Lactate, Blood: 5.0 mmol/L ( @ 21:00)  Lactate, Blood: 5.1 mmol/L ( @ 17:25)  Lactate, Blood: 5.6 mmol/L ( @ 12:30)      POCT Blood Glucose.: 112 mg/dL (2023 12:03)  POCT Blood Glucose.: 93 mg/dL (2023 09:33)  POCT Blood Glucose.: 89 mg/dL (2023 07:44)  POCT Blood Glucose.: 65 mg/dL (2023 06:54)  POCT Blood Glucose.: 64 mg/dL (2023 06:44)      Urinalysis Basic - ( 2023 06:55 )    Color: Yellow / Appearance: Clear / S.015 / pH: x  Gluc: x / Ketone: Trace  / Bili: Negative / Urobili: 1   Blood: x / Protein: 30 mg/dL / Nitrite: Negative   Leuk Esterase: Trace / RBC: 0-4 /HPF / WBC 0-2 /HPF   Sq Epi: x / Non Sq Epi: Neg.-Few / Bacteria: Trace /HPF        Culture - Blood (collected 2023 07:18)  Source: .Blood Blood  Preliminary Report (2023 13:02):    No growth to date.    Culture - Blood (collected 2023 07:18)  Source: .Blood Blood  Preliminary Report (2023 13:02):    No growth to date.   SWELLING     Throat swelling   • Contrast Media PRURITUS     Pt. States this is CT contrast that she is allergic to.   • Keflex SWELLING     Had lip, tongue and throat swelling.    • Penicillin G ANAPHYLAXIS   • Penicillins ANAPHYLAXIS     All PCN derivatives   • Tape [Adhesive] RASH     Blisters    • Dilaudid [Hydromorphone] PRURITUS   • Fentanyl PRURITUS   • Azithromycin    • Biaxin [Clarithromycin] RASH     cellulitis   • Cyclosporine Other (See Comments)     Built up intolerance - affected liver and blood counts - also   • Erythromycin Other (See Comments)     Affects MG - causes generalized weakness   • Hydrocodone PRURITUS   • Morphine RASH     blisters   • Oxycodone RASH and PRURITUS   • Sulfa Antibiotics RASH and PRURITUS       SOCIAL HISTORY: Unchanged from last clinic visit.    SURGICAL HISTORY:   Past Surgical History   Procedure Laterality Date   • Appendectomy     • Removal gallbladder       Cholecystectomy (gallbladder)   • Thymectomy,radical mediast disssec  01/01/1993   • Laparoscopy procedure unlisted       operative laparoscopies,    • Leg/ankle surgery proc unlisted       Ankle surgeries   • Repair epigastric hernia,reduc       s/p surg. infections from epigastric hernia repair   • Diaphragm plication  08/2011   • Pelvic laparoscopy  8/1990     omental adhesions lysed, peritoneal window noted   • Pelvic laparoscopy  11/1990     unremarkable   • Tubal ligation  5/1995     postpartum partial salpingectomies   • Endometrial ablation  11/2005     NovaSure   • Salpingoophorectomy Left 04/02/2015     [facility], Dr. Birch   • Partial nephrectomy Left 04/02/2015     Left, Dr. Partida, Robotic Partial Nephrectomy   • Mediport insertion, single Right 4/19/2014     Power Port   • Esophagogastroduodenoscopy transoral flex w/bx single or mult  7/8/2015     EGD with Bx   • Open access colonoscopy  7/8/2015     Colonoscopy, Screen   • Hb endoscopy capsule colon  7/17/2015       MEDICAL HISTORY:   Past Medical History    Gait: Careful, mild waddling, slightly unsteady jabari with turning.       CT Abdomen/Chest 5/25/18:    Stable a 26 mm hyperdense nodule superior right kidney.     Stable 26 mm exophytic hypodense nodule superior pole right kidney image 49  series 4 measuring 16 Hounsfield units.     Stable 35 mm exophytic hypodense nodule inferior pole right kidney  measuring 17 Hounsfield units.      TESTS REVIEWED:        Component      Latest Ref Rng & Units 9/11/2017 9/11/2017          12:01 AM 12:01 AM   WBC      4.2 - 11.0 K/mcL 7.8    RBC      4.00 - 5.20 mil/mcL 3.29 (L)    HGB      12.0 - 15.5 g/dL 9.7 (L)    HCT      36.0 - 46.5 % 31.8 (L)    MCV      78.0 - 100.0 fl 96.7    MCH      26.0 - 34.0 pg 29.5    MCHC      32.0 - 36.5 g/dL 30.5 (L)    RDW-CV      11.0 - 15.0 % 14.0    PLT      140 - 450 K/mcL 351    DIFFERENTIAL TYPE       AUTOMATED DIFFERENTIAL    Neutrophil      % 85    LYMPH      % 10    MONO      % 5    EOSIN      % 0    BASO      % 0    Absolute Neutrophil      1.8 - 7.7 K/mcL 6.6    Absolute Lymph      1.0 - 4.0 K/mcL 0.8 (L)    Absolute Mono      0.3 - 0.9 K/mcL 0.4    Absolute Eos      0.1 - 0.5 K/mcL 0.0 (L)    Absolute Baso      0.0 - 0.3 K/mcL 0.0    Fasting Status      hrs UNK    Sodium      135 - 145 mmol/L 136    Potassium      3.4 - 5.1 mmol/L 4.0    Chloride      98 - 107 mmol/L 104    CO2      21 - 32 mmol/L 25    ANION GAP      10 - 20 mmol/L 11    Glucose      65 - 99 mg/dL 81    BUN      6 - 20 mg/dL 23 (H)    Creatinine      0.51 - 0.95 mg/dL 1.41 (H)    GFR Estimate,        48    GFR Estimate, Non        42    

## 2023-01-05 NOTE — PROVIDER CONTACT NOTE (HYPOGLYCEMIA EVENT) - NS PROVIDER CONTACT BACKGROUND-HYPO
Age: 37y    Gender: Male    POCT Blood Glucose:  188 mg/dL (01-05-23 @ 19:03)  287 mg/dL (01-05-23 @ 15:56)  38 mg/dL (01-05-23 @ 15:42)  112 mg/dL (01-05-23 @ 12:03)  93 mg/dL (01-05-23 @ 09:33)  89 mg/dL (01-05-23 @ 07:44)  65 mg/dL (01-05-23 @ 06:54)  64 mg/dL (01-05-23 @ 06:44)      eMAR:dexAMETHasone     Tablet   2 milliGRAM(s) Oral (01-05-23 @ 19:00)   2 milliGRAM(s) Oral (01-05-23 @ 05:03)    dextrose 50% Injectable   25 Gram(s) IV Push (01-05-23 @ 15:52)    dextrose Oral Gel   15 Gram(s) Oral (01-05-23 @ 07:01)

## 2023-01-05 NOTE — PROVIDER CONTACT NOTE (HYPOGLYCEMIA EVENT) - NS PROVIDER CONTACT BACKGROUND-HYPO
Age: 37y    Gender: Male    POCT Blood Glucose:  65 mg/dL (01-05-23 @ 06:54)  64 mg/dL (01-05-23 @ 06:44)      eMAR:dexAMETHasone     Tablet   2 milliGRAM(s) Oral (01-05-23 @ 05:03)   2 milliGRAM(s) Oral (01-04-23 @ 17:44)    dextrose Oral Gel   15 Gram(s) Oral (01-05-23 @ 07:01)    hydrocortisone sodium succinate Injectable   100 milliGRAM(s) IV Push (01-04-23 @ 07:22)     Age: 37y    Gender: Male    POCT Blood Glucose:  65 mg/dL (01-05-23 @ 06:54)  64 mg/dL (01-05-23 @ 06:44)      eMAR:dexAMETHasone     Tablet   2 milliGRAM(s) Oral (01-05-23 @ 05:03)   2 milliGRAM(s) Oral (01-04-23 @ 17:44)    dextrose Oral Gel   15 Gram(s) Oral (01-05-23 @ 07:01)    hydrocortisone sodium succinate Injectable   100 milliGRAM(s) IV Push (01-04-23 @ 07:22)    Morning RN made aware - fs to be rechecked.

## 2023-01-05 NOTE — PROGRESS NOTE ADULT - PROBLEM SELECTOR PLAN 1
Hepatocellular carcinoma on background of hepatic cirrhosis, liver failure, ascites.  Patient admitted to hospice, but brought in with encephalopathy, leukocytosis, dehydration and hypoglycemia.  Evaluated by ID for increased lactate; pending cultures but not clear evidence of bacterial infection.  Continues on Zosyn.  Patient with COVID infection.  Hematologic picture stable, with minor leukocytosis and predominant neutrophils.  Continue supportive care.  No role for systemic therapy.  Hospice reevaluation to be requested.

## 2023-01-05 NOTE — PROGRESS NOTE ADULT - ASSESSMENT
A/P 37 Greenlandic speaking male hx of recently dx metastatic liver cancer to lung about 2 months ago s/p recent abd Pleurx placed and getting drained 3x/week for the past 2 weeks , has been home w/ hospice services,  pw from home w/ AMS with hypoglycemia, elevated ammonia levels,   sepsis and +COVID.       AMS with hepatic encephalopathy in setting of metastatic liver cancer- improving today      leukocytosis  Check CTAP.  IV Ceftriaxone  GI consult - appreciated   lactulose enemas (failed dysphagia in ED)  Likely hepatic encephalopathy in setting of metastatic liver cancer    Sepsis ruled out, Leukocytosis likely in setting of chronic steroid use.   - IV Zosyn for now , pending blood cultures x2.   -GI Dr Moss consulted, recommendations appreciated. Unlikely SBP, follow up cell -count from pleux fluid  -CT abd/pelvis performed  -Heme/Onc -  Dr Rouse,, plan to obtain records from [facility], suggests continue hospice   Hypoglycemia  on dexamethasone.   switched back to dexa po    Metastatic liver cancer  brother to bring in info regarding pt's care providers? from [facility]   Dx approx 2 months ago at [facility]   [facility]  - confirmed w/ GS     ***-  at [facility]  [place] - [phone] ***      COVID positive  not candidate for Remdesivir given LFTs/liver cancer  supportive care.       Palliative ;   Pt denies pain presently. Asking for something to help him sleep.   Pt stated he is aware he is " very sick", "cancer". Stated" im waiting to die."  I asked about his wife, pt says her name is Kendra and had her number on his phone-  [phone]- I called and no answer, unable to leave any message  voice Mbox not set up.  I also called brother Kvng as a follow up , who I met with yesterday, pt agrees for me to speak with his brother , I reached out to cont our [facility] discussion , [phone] - also no answer, unable to leave message , voice Mbox not set up.  No family at bedside repeatedly    if pt remains full code, he cannot be considered for inpatient hospice services.    In that case , when pt  stable , per med team, he should return to home and have home hospice services re- instated with [facility] .

## 2023-01-05 NOTE — DIETITIAN INITIAL EVALUATION ADULT - PERTINENT MEDS FT
MEDICATIONS  (STANDING):  ascorbic acid 500 milliGRAM(s) Oral daily  cholecalciferol 1000 Unit(s) Oral daily  dexAMETHasone     Tablet 2 milliGRAM(s) Oral two times a day  dextrose 50% Injectable 25 Gram(s) IV Push once  dextrose 50% Injectable 12.5 Gram(s) IV Push once  dextrose 50% Injectable 25 Gram(s) IV Push once  dextrose Oral Gel 15 Gram(s) Oral once  enoxaparin Injectable 40 milliGRAM(s) SubCutaneous every 24 hours  furosemide    Tablet 20 milliGRAM(s) Oral daily  glucagon  Injectable 1 milliGRAM(s) IntraMuscular once  lactulose Retention Enema 200 Gram(s) Rectal three times a day  melatonin 6 milliGRAM(s) Oral at bedtime  pantoprazole    Tablet 40 milliGRAM(s) Oral before breakfast  piperacillin/tazobactam IVPB.. 3.375 Gram(s) IV Intermittent every 8 hours  sodium chloride 0.9% Bolus 500 milliLiter(s) IV Bolus once  spironolactone 25 milliGRAM(s) Oral daily  zinc sulfate 220 milliGRAM(s) Oral daily    MEDICATIONS  (PRN):  HYDROmorphone   Tablet 4 milliGRAM(s) Oral every 4 hours PRN Severe Pain (7 - 10)

## 2023-01-05 NOTE — PROGRESS NOTE ADULT - SUBJECTIVE AND OBJECTIVE BOX
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  Patient has been clinically deteriorating, has a Pleurx catheter in place and has therapeutic pleural fluid removal 3 times a week.  On arrival to the emergency room with abdominal pain patient had a CT abdomen did show hepatosplenomegaly, innumerable masses visualized within the lung parenchyma measuring up to 2.5 cm in size consistent with metastatic disease, innumerable masses in the hepatic parenchyma measuring up to 7.5 cm consistent with hepatic malignancy, portal hypertension, a small volume of ascites, L4 metastatic deposit.  CT head showed no acute pathology.  Evaluated by palliative care team in [place] deemed appropriate for hospice.  Patient has never been treated with systemic therapy.  Hematologic picture consistent with coagulopathy, secondary to hypersplenism/thrombocytopenia, elevated LFTs.  In the emergency room he was found encephalopathic, hypoglycemic, acute COVID +.  Medical oncology consulted to discuss prognosis.    MEDICATIONS:  ascorbic acid 500 milliGRAM(s) Oral daily  cholecalciferol 1000 Unit(s) Oral daily  dexAMETHasone     Tablet 2 milliGRAM(s) Oral two times a day  dextrose 50% Injectable 25 Gram(s) IV Push once  dextrose 50% Injectable 12.5 Gram(s) IV Push once  dextrose 50% Injectable 25 Gram(s) IV Push once  dextrose Oral Gel 15 Gram(s) Oral once  enoxaparin Injectable 40 milliGRAM(s) SubCutaneous every 24 hours  furosemide    Tablet 20 milliGRAM(s) Oral daily  glucagon  Injectable 1 milliGRAM(s) IntraMuscular once  HYDROmorphone   Tablet 4 milliGRAM(s) Oral every 4 hours PRN  lactulose Retention Enema 200 Gram(s) Rectal three times a day  melatonin 6 milliGRAM(s) Oral at bedtime  pantoprazole    Tablet 40 milliGRAM(s) Oral before breakfast  piperacillin/tazobactam IVPB.. 3.375 Gram(s) IV Intermittent every 8 hours  sodium chloride 0.9% Bolus 500 milliLiter(s) IV Bolus once  spironolactone 25 milliGRAM(s) Oral daily  zinc sulfate 220 milliGRAM(s) Oral daily    All other medications reviewed.    SUBJECTIVE:  No new events over night.  Stable clinically; poor po intake.    VITALS:  T(C): 36.6 (01-04-23 @ 16:42), Max: 36.6 (01-04-23 @ 16:42)  T(F): 97.9 (01-04-23 @ 16:42), Max: 97.9 (01-04-23 @ 16:42)  HR: 105 (01-05-23 @ 05:10) (105 - 123)  BP: 126/89 (01-05-23 @ 05:10) (118/81 - 129/89)      PHYSICAL EXAM:  Constitutional: alert,cachectic  HEENT: normocephalic, anicteric sclerae, no mucositis or thrush  Respiratory: bilateral clear to auscultation anteriorly  Cardiovascular : S1, S2 regular, rhythmic, no murmurs, gallops or rubs  Abdomen: soft, distended, + normoactive BS, no palpable HS- megaly  Extremities: no tenderness;  -c/c/e, pulses equal bilaterally    LABS:  (01-04) WBC: 12.84 K/uL,Hemoglobin: 13.1 g/dL, Hematocrit: 40.7 %,  Platelet: 139 K/uL  (01-04) Na: 133 mmol/L ; K: 4.0 mmol/L ; BUN: 13 mg/dL ; Cr: 0.40 mg/dL.  PT/INR - ( 03 Jan 2023 05:45 )   PT: 31.7 sec;   INR: 2.71 ratio  PTT - ( 03 Jan 2023 05:45 )  PTT:40.4 sec  Ca    8.2<L>      03 Jan 2023 05:45  TPro  5.6<L>  /  Alb  2.3<L>  /  TBili  2.0<H>  /  DBili  x   /  AST  78<H>  /  ALT  78<H>  /  AlkPhos  199<H>  01-03  LIVER FUNCTIONS - ( 03 Jan 2023 05:45 )  Alb: 2.3 g/dL / Pro: 5.6 g/dL / ALK PHOS: 199 U/L / ALT: 78 U/L / AST: 78 U/L / GGT: x         Lipase, Serum: 23 U/L (01-03-23 @ 05:45)    RADIOLOGY:  ACC: [id number] EXAM:  CT ABDOMEN AND PELVIS                          PROCEDURE DATE:  01/03/2023      INTERPRETATION:  CLINICAL INFORMATION: Sepsis, abdominal pain. Reported   hepatic malignancy/cirrhosis.    COMPARISON: None.    CONTRAST/COMPLICATIONS:  IV Contrast: NONE  Oral Contrast: NONE  Complications: None reported at time of study completion    PROCEDURE:  CT of the Abdomen and Pelvis was performed.  Sagittal and coronal reformats were performed.    FINDINGS:  LOWER CHEST: Innumerable masses are visualized within the lung parenchyma   measuring up to 2.5 cm in size consistent with lung parenchymal   metastatic neoplastic disease.    LIVER: Hepatomegaly. Heterogeneous attenuation of the hepatic parenchyma   identified with innumerable masses within the hepatic parenchyma   measuring up to 7.5 cm consistent with reported hepatic malignancy.  BILE DUCTS: No intrahepatic or extrahepatic biliary ductal dilatation   identified.  GALLBLADDER: No gross gallstones or gallbladder wall thickening   identified.  SPLEEN: Splenomegaly.  PANCREAS: Within normal limits.  ADRENALS: Soft tissue nodularity measuring 1.9 cm is identified within   the right suprarenal region and a right adrenal nodule cannot be   excluded. The left adrenal appears unremarkable.  KIDNEYS/URETERS: No hydronephrosis. No renal calculi. No space-occupying   lesions of the renal parenchyma identified.    BLADDER: Minimally distended, precluding assessment.  REPRODUCTIVE ORGANS: The prostate appears unremarkable.    BOWEL: No evidence for mechanical bowel obstruction. Pancolonic wall   thickening is suggested raising suspicion for colitis. No evidence for   acute appendicitis.  PERITONEUM: Indwelling peritoneal change catheter. A small volume of   ascites isidentified with fluid noted within the right inguinal canal.   Diffuse stranding of the mesentery noted which may be related to the   presence of ascites; a 2.2 cm soft tissue nodule is identified posterior   to the cecum and a mesenteric neoplastic deposit cannot be excluded. No   free intraperitoneal air.  VESSELS: Recanalized paraumbilical vein and perisplenic varices likely   related to portal hypertension.  RETROPERITONEUM/LYMPH NODES: No lymphadenopathy.  ABDOMINAL WALL: Within normal limits.  BONES: A region of circumscribed lucency is identified within the   anterior/inferior aspect of the L4 vertebral body, a metastatic   neoplastic deposit cannot be excluded.    IMPRESSION:  1. Pancolonic wall thickening is suggested raising suspicion for colitis.  2. Innumerable masses are visualized within the lung parenchyma measuring   up to 2.5 cm in size consistent with lung parenchymal metastatic   neoplastic disease.  3. Hepatomegaly. Heterogeneous attenuation of the hepatic parenchyma   identified with innumerable masses within the hepatic parenchyma   measuring up to 7.5 cm consistent with reported hepatic malignancy.   Splenomegaly. Portal hypertension.  4. Indwelling peritoneal change catheter. A small volume of ascites is   identified with fluid noted within the right inguinal canal. Diffuse   stranding of the mesentery noted which may be related to the presence of   ascites; a 2.2 cm soft tissue nodule is identified posterior to the cecum   and a mesenteric neoplastic deposit cannot be excluded.  5. A region of circumscribed lucency is identified within the   anterior/inferior aspect of the L4 vertebral body, a metastatic   neoplastic deposit cannot be excluded.

## 2023-01-05 NOTE — PROGRESS NOTE ADULT - ASSESSMENT
37M with recently diagnosed metastatic liver cancer to lung about 2 months ago, on home hospice, s/p recent abdominal Pleurx getting drained 3x/week presents with AMS and hypoglycemia. Admitted for metabolic encephalopathy, sepsis, and viral PNA from COVID.     #Metabolic and hepatic encephalopathy  #Metastatic liver Cancer  #Hyponatremia  #Transaminitis  #Elevated total bilirubin   #Elevated lactate due to liver cancer and poor PO intake  -Encourage PO intake.... can give IVF prn  -GI following, c/w lactulose, lasix and aldactone  -Oncology following - consideration to resume hospice upon d/c  -Palliative care consulted - advanced care planning discussions in progress     #Hypoglycemia  Likely due to poor PO intake and poor liver function  -Continue hypoglycemia protocol prn  -c/w Dexamethasone PO    #Viral PNA 2/2 COVID  -Not a candidate for Remdesivir given transaminitis  -Not hypoxic at this time    #Severe protein calorie malnutrition  #Cachexia  -Continue multivitamin  -Supportive care  -Dietician support appreciated, ? role for dietary supplements     #Prophylactic Measure  -DVT ppx: Lovenox    Discussed with brother Manual at bedside. We discussed advanced care planning. Patient is debating about DNR/DNI, but wants to be full code right. Patient seems to not fully understand ramifications of CPR. Will need continued discussions with patient - who is currently competent to make these decision. Prognosis long-term is poor. Patient also inquiring about returning to [facility] and spend his remaining days there - but may require certain documentation for travel - I asked CM/SW team to assist with this....    Wife Kendra [phone] - no answer   37M with recently diagnosed metastatic liver cancer to lung about 2 months ago, on home hospice, s/p recent abdominal Pleurx getting drained 3x/week presents with AMS and hypoglycemia. Admitted for metabolic encephalopathy, sepsis, and viral PNA from COVID.     #Metabolic and hepatic encephalopathy  #Metastatic liver Cancer  #Hyponatremia  #Transaminitis  #Elevated total bilirubin   #Elevated lactate due to liver cancer and poor PO intake  -Encourage PO intake.... can give IVF prn  -GI following, c/w lactulose, lasix and aldactone  -Oncology following - consideration to resume hospice upon d/c  -Palliative care consulted - advanced care planning discussions in progress     #Hypoglycemia  Likely due to poor PO intake and poor liver function  -Continue hypoglycemia protocol prn  -c/w Dexamethasone PO    #Viral PNA 2/2 COVID  -Not a candidate for Remdesivir given transaminitis  -Not hypoxic at this time    #Severe protein calorie malnutrition  #Cachexia  -Continue multivitamin  -Supportive care  -Dietician support appreciated, ? role for dietary supplements     #Thrush  -start Nystatin    #Tachycardia  -Multifactorial including deconditioning and poor PO intake  -Encourage PO    #Prophylactic Measure  -DVT ppx: Lovenox    Discussed with brother Manual at bedside. We discussed advanced care planning. Patient is debating about DNR/DNI, but wants to be full code right. Patient seems to not fully understand ramifications of CPR. Will need continued discussions with patient - who is currently competent to make these decision. Prognosis long-term is poor. Patient also inquiring about returning to [facility] and spend his remaining days there - but may require certain documentation for travel - I asked CM/SW team to assist with this....    Wife Kendra [phone] - no answer

## 2023-01-05 NOTE — PATIENT PROFILE ADULT - LANGUAGE ASSISTANCE NEEDED
pt can communicate needs in english/No-Patient/Caregiver offered and refused free interpretation services.

## 2023-01-05 NOTE — PROGRESS NOTE ADULT - SUBJECTIVE AND OBJECTIVE BOX
Progress: pt comfortable,  however remains confused.  Call placed to brother.  No answer    Present Symptoms:   Dyspnea: n  Nausea/Vomiting: n  Anxiety:  n  Depressed Mood: n  Fatigue: n  Loss of appetite: y  Pain:   no apparent                location:   Review of Systems:  Unable to obtain due to poor mentation    MEDICATIONS  (STANDING):  ascorbic acid 500 milliGRAM(s) Oral daily  cholecalciferol 1000 Unit(s) Oral daily  dexAMETHasone     Tablet 2 milliGRAM(s) Oral two times a day  dextrose 50% Injectable 25 Gram(s) IV Push once  dextrose 50% Injectable 12.5 Gram(s) IV Push once  dextrose 50% Injectable 25 Gram(s) IV Push once  dextrose Oral Gel 15 Gram(s) Oral once  enoxaparin Injectable 40 milliGRAM(s) SubCutaneous every 24 hours  furosemide    Tablet 20 milliGRAM(s) Oral daily  glucagon  Injectable 1 milliGRAM(s) IntraMuscular once  lactulose Retention Enema 200 Gram(s) Rectal three times a day  melatonin 6 milliGRAM(s) Oral at bedtime  pantoprazole    Tablet 40 milliGRAM(s) Oral before breakfast  piperacillin/tazobactam IVPB.. 3.375 Gram(s) IV Intermittent every 8 hours  sodium chloride 0.9% Bolus 500 milliLiter(s) IV Bolus once  spironolactone 25 milliGRAM(s) Oral daily  zinc sulfate 220 milliGRAM(s) Oral daily    MEDICATIONS  (PRN):  HYDROmorphone   Tablet 4 milliGRAM(s) Oral every 4 hours PRN Severe Pain (7 - 10)      PHYSICAL EXAM:  Vital Signs Last 24 Hrs  T(C): 36.6 (04 Jan 2023 16:42), Max: 36.6 (04 Jan 2023 16:42)  T(F): 97.9 (04 Jan 2023 16:42), Max: 97.9 (04 Jan 2023 16:42)  HR: 105 (05 Jan 2023 05:10) (105 - 123)  BP: 126/89 (05 Jan 2023 05:10) (118/81 - 129/89)  BP(mean): --  RR: 16 (04 Jan 2023 16:42) (16 - 16)  SpO2: 98% (05 Jan 2023 05:10) (97% - 98%)    Parameters below as of 04 Jan 2023 16:42  Patient On (Oxygen Delivery Method): room air      General:cachectic alert  oriented x _1___      HEENT: normal    Lungs: comfortable   CV: normal    GI: abdomen distended  : normal    Musculoskeletal: profound weekness  Skin: normal    Neuro:confusion  Oral intake ability:  oral feeding  Diet: NPO,     LABS:                          13.1   12.84 )-----------( 139      ( 04 Jan 2023 07:00 )             40.7     01-05    134<L>  |  x   |  x   ----------------------------<  x   x    |  x   |  0.55    Ca    8.8      04 Jan 2023 07:00    TPro  x   /  Alb  x   /  TBili  3.3<H>  /  DBili  x   /  AST  x   /  ALT  x   /  AlkPhos  x   01-05        RADIOLOGY & ADDITIONAL STUDIES:    ADVANCE DIRECTIVES:  Advanced Care Planning discussion total time spent:

## 2023-01-05 NOTE — DIETITIAN INITIAL EVALUATION ADULT - SIGNS/SYMPTOMS
as evidence by loss of body fat & severe muscle wasting  as evidence by elevated LFT's , elevated t.bili

## 2023-01-05 NOTE — DIETITIAN INITIAL EVALUATION ADULT - OTHER INFO
37 Tajik speaking male hx of recently dx metastatic liver cancer to lung about 2 months ago s/p recent abd Pleurx placed and getting drained 3x/week for the past 2 weeks (last drained yesterday) presents with AMS and hypoglycemia at home. Pt unable to provide hx sec to AMS. Patient noted lethargic & confused , POCT reviewed, hypoglycemia noted, Decadron provided . No edema , BM (1/5) Ammonia 147, patient receiving lactulose enemas . Patient noted with severe muscle wasting & loss of body fat observed .  37 Kazakh speaking male hx of recently dx metastatic liver cancer to lung about 2 months ago s/p recent abd Pleurx placed and getting drained 3x/week for the past 2 weeks (last drained yesterday) presents with AMS and hypoglycemia at home. Pt unable to provide hx sec to AMS. Patient noted lethargic & confused , POCT reviewed, hypoglycemia noted, Decadron provided . No edema , BM (1/5) Ammonia 147, patient receiving lactulose enemas . Patient noted with severe muscle wasting & loss of body fat observed . Patient was noted under Hospice services PTA , however family requesting hospitalization for AMS .

## 2023-01-06 NOTE — PROGRESS NOTE ADULT - SUBJECTIVE AND OBJECTIVE BOX
Progress:  more awake today    Present Symptoms:   Dyspnea: y  Nausea/Vomiting: n  Anxiety:  n  Depressed Mood: n  Fatigue: n  Loss of appetite: y  Pain:   n                location:   Review of Systems: All others negative     MEDICATIONS  (STANDING):  ascorbic acid 500 milliGRAM(s) Oral daily  cholecalciferol 1000 Unit(s) Oral daily  dexAMETHasone     Tablet 2 milliGRAM(s) Oral two times a day  dextrose 50% Injectable 25 Gram(s) IV Push once  dextrose 50% Injectable 12.5 Gram(s) IV Push once  dextrose 50% Injectable 25 Gram(s) IV Push once  dextrose Oral Gel 15 Gram(s) Oral once  enoxaparin Injectable 40 milliGRAM(s) SubCutaneous every 24 hours  furosemide    Tablet 20 milliGRAM(s) Oral daily  glucagon  Injectable 1 milliGRAM(s) IntraMuscular once  lactulose Retention Enema 200 Gram(s) Rectal three times a day  melatonin 6 milliGRAM(s) Oral at bedtime  nystatin    Suspension 924517 Unit(s) Oral four times a day  pantoprazole    Tablet 40 milliGRAM(s) Oral before breakfast  spironolactone 25 milliGRAM(s) Oral daily  zinc sulfate 220 milliGRAM(s) Oral daily    MEDICATIONS  (PRN):  HYDROmorphone   Tablet 4 milliGRAM(s) Oral every 4 hours PRN Severe Pain (7 - 10)      PHYSICAL EXAM:  Vital Signs Last 24 Hrs  T(C): 36.7 (06 Jan 2023 12:00), Max: 36.9 (05 Jan 2023 16:36)  T(F): 98 (06 Jan 2023 12:00), Max: 98.4 (05 Jan 2023 16:36)  HR: 110 (06 Jan 2023 12:00) (100 - 117)  BP: 126/85 (06 Jan 2023 12:00) (115/79 - 126/85)  BP(mean): --  RR: 17 (06 Jan 2023 12:00) (15 - 17)  SpO2: 97% (06 Jan 2023 12:00) (84% - 99%)    Parameters below as of 06 Jan 2023 12:00  Patient On (Oxygen Delivery Method): nasal cannula  O2 Flow (L/min): 3  3  General: alert  oriented x ____      HEENT: normal    Lungs: pleurex in place  CV: normal    GI: normal    : normal    Musculoskeletal: normal   Skin: normal    Neuro: no deficits   Oral intake ability:  oral feeding  Diet: NPO,     LABS:                          13.5   14.05 )-----------( 152      ( 06 Jan 2023 06:15 )             41.3     01-06    137  |  104  |  11  ----------------------------<  109<H>  3.5   |  23  |  0.60    Ca    8.6      06 Jan 2023 06:15  Phos  2.9     01-06  Mg     1.7     01-06    TPro  x   /  Alb  x   /  TBili  3.3<H>  /  DBili  x   /  AST  x   /  ALT  x   /  AlkPhos  x   01-05        RADIOLOGY & ADDITIONAL STUDIES:    ADVANCE DIRECTIVES:  Advanced Care Planning discussion total time spent:

## 2023-01-06 NOTE — PROGRESS NOTE ADULT - ASSESSMENT
37M with recently diagnosed metastatic liver cancer to lung about 2 months ago, on home hospice, s/p recent abdominal Pleurx getting drained 3x/week presents with AMS and hypoglycemia. Admitted for metabolic encephalopathy, sepsis, and viral PNA from COVID.     #Metabolic and hepatic encephalopathy  #Metastatic liver Cancer  #Hyponatremia  #Transaminitis  #Elevated total bilirubin   #Elevated lactate due to liver cancer and poor PO intake  -Encourage PO intake  -GI following, c/w lactulose, lasix and aldactone  -Oncology following - consideration to resume hospice upon d/c  -Palliative care consulted - advanced care planning discussions in progress     #Hypoglycemia  Likely due to poor PO intake and poor liver function  -Continue hypoglycemia protocol prn  -c/w Dexamethasone PO  -Will do trial off D5W at this time.... if sugars drop again, may need to discuss if patient can receive gentle D5W while on hospice     #Viral PNA 2/2 COVID  #Acute hypoxic respiratory failure   -Not a candidate for Remdesivir given transaminitis  -One episode of exertional hypoxia, could be from atelectasis vs fluid-induced vs COVID... will repeat CXR today, Will not change long-term outcome unfortunately     #Severe protein calorie malnutrition  #Cachexia  -Continue multivitamin  -Supportive care    #Thrush  -c/w Nystatin    #Tachycardia  -Multifactorial including deconditioning and poor PO intake  -Encourage PO    #Prophylactic Measure  -DVT ppx: Lovenox    Palliative: In patient hospice was discussed.  Pt and wife are not interested  at this time.

## 2023-01-06 NOTE — PROGRESS NOTE ADULT - PROBLEM SELECTOR PLAN 1
Patient continues to stay on isolation for acute COVID infection, treated supportively.  Could not receive remdesivir given advanced liver disease.  Diagnosed with rapidly progressing hepatocellular carcinoma on background of cirrhosis.  Not a candidate for systemic therapy.  Received prophylactic antibiotics, now discontinued.  No evident infection.  Evaluated by palliative care; unfortunately patient's family not reached an agreement regarding DNR (patient is still FULL CODE status), although patient's brother, Kvng, aware of futility of resuscitation.

## 2023-01-06 NOTE — PROGRESS NOTE ADULT - SUBJECTIVE AND OBJECTIVE BOX
CC: f/u for  covid  Patient reports he wants to walk    REVIEW OF SYSTEMS:  All other review of systems negative (Comprehensive ROS)    Antimicrobials Day #  :  nystatin    Suspension 177167 Unit(s) Oral four times a day    Other Medications Reviewed    T(F): 98.1 (01-06-23 @ 16:01), Max: 98.3 (01-06-23 @ 00:51)  HR: 111 (01-06-23 @ 16:01)  BP: 118/81 (01-06-23 @ 16:01)  RR: 16 (01-06-23 @ 18:42)  SpO2: 97% (01-06-23 @ 18:42)  Wt(kg): --    PHYSICAL EXAM:  General: alert, no acute distress, cachectic  Eyes:  anicteric, no conjunctival injection, no discharge  Oropharynx: no lesions or injection 	  Neck: supple, without adenopathy  Lungs: course to auscultation  Heart: regular rate and rhythm; no murmur, rubs or gallops  Abdomen: soft, nondistended, nontender, without mass or organomegaly  Skin: no lesions  Extremities: no clubbing, cyanosis, or edema  Neurologic: alert, oriented, moves all extremities    LAB RESULTS:                        13.5   14.05 )-----------( 152      ( 06 Jan 2023 06:15 )             41.3     01-06    137  |  104  |  11  ----------------------------<  109<H>  3.5   |  23  |  0.60    Ca    8.6      06 Jan 2023 06:15  Phos  2.9     01-06  Mg     1.7     01-06    TPro  x   /  Alb  x   /  TBili  3.3<H>  /  DBili  x   /  AST  x   /  ALT  x   /  AlkPhos  x   01-05        MICROBIOLOGY:  RECENT CULTURES:  01-03 @ 07:18 .Blood Blood     No growth to date.          RADIOLOGY REVIEWED:    < from: CT Abdomen and Pelvis No Cont (01.03.23 @ 07:58) >  IMPRESSION:  1. Pancolonic wall thickening is suggested raising suspicion for colitis.  2. Innumerable masses are visualized within the lung parenchyma measuring   up to 2.5 cm in size consistent with lung parenchymal metastatic   neoplastic disease.  3. Hepatomegaly. Heterogeneous attenuation of the hepatic parenchyma   identified with innumerable masses within the hepatic parenchyma   measuring up to 7.5 cm consistent with reported hepatic malignancy.   Splenomegaly. Portal hypertension.  4. Indwelling peritoneal change catheter. A small volume of ascites is   identified with fluid noted within the right inguinal canal. Diffuse   stranding of the mesentery noted which may be related to the presence of   ascites; a 2.2 cm soft tissue nodule is identified posterior to the cecum   and a mesenteric neoplastic deposit cannot be excluded.  5. A region of circumscribed lucency is identified within the   anterior/inferior aspect of the L4 vertebral body, a metastatic   neoplastic deposit cannot be excluded.          --- End of Report ---            < end of copied text >            Assessment:  Patient with advanced metastatic hepatoma on hospice at home , came in for altered ms , hypoglycemia likely from liver failure and tumor metabolism. covid infected, hypoxia from covid and tumor. On baseline decadron will up to 6mg   Plan:  continue decadron  not a candidate for rdv, no role for abx

## 2023-01-06 NOTE — PROGRESS NOTE ADULT - ASSESSMENT
37M with recently diagnosed metastatic liver cancer to lung about 2 months ago, on home hospice, s/p recent abdominal Pleurx getting drained 3x/week presents with AMS and hypoglycemia. Admitted for metabolic encephalopathy, sepsis, and viral PNA from COVID.     #Metabolic and hepatic encephalopathy  #Metastatic liver Cancer  #Hyponatremia  #Transaminitis  #Elevated total bilirubin   #Elevated lactate due to liver cancer and poor PO intake  -Encourage PO intake  -GI following, c/w lactulose, lasix and aldactone  -Oncology following - consideration to resume hospice upon d/c  -Palliative care consulted - advanced care planning discussions in progress     #Hypoglycemia  Likely due to poor PO intake and poor liver function  -Continue hypoglycemia protocol prn  -c/w Dexamethasone PO  -Will do trial off D5W at this time.... if sugars drop again, may need to discuss if patient can receive gentle D5W while on hospice     #Viral PNA 2/2 COVID  -Not a candidate for Remdesivir given transaminitis  -One episode of exertional hypoxia, could be from atelectasis vs fluid-induced vs COVID... will repeat CXR today, Will not change long-term outcome unfortunately     #Severe protein calorie malnutrition  #Cachexia  -Continue multivitamin  -Supportive care    #Thrush  -c/w Nystatin    #Tachycardia  -Multifactorial including deconditioning and poor PO intake  -Encourage PO    #Prophylactic Measure  -DVT ppx: Lovenox    Patient declined my calling his family members today. I did speak to patient's brother, Manual, yesterday. Patient and I had an extensive conversation today about disease process, prognosis long-term, and consideration for DNR/DNI and its impact on quality of life. Patient to think about it. I asked palliative care team to follow-up today, and possibly show patient videos about CPR. In the meantime, will check CXR. Would plan for dispo as soon as possible... the question will be home with hospice, vs inpatient hospice... Patient also inquiring about going to [facility], unclear if this will be feasible in his condition. Long term prognosis is poor. Appreciate palliative care efforts in regards to his dispo planning. CM/SW follow-up. I personally spent  17 minutes face-to-face with patient discussing advance care planning and code status. Will consider MOLST eventually. 37M with recently diagnosed metastatic liver cancer to lung about 2 months ago, on home hospice, s/p recent abdominal Pleurx getting drained 3x/week presents with AMS and hypoglycemia. Admitted for metabolic encephalopathy, sepsis, and viral PNA from COVID.     #Metabolic and hepatic encephalopathy  #Metastatic liver Cancer  #Hyponatremia  #Transaminitis  #Elevated total bilirubin   #Elevated lactate due to liver cancer and poor PO intake  -Encourage PO intake  -GI following, c/w lactulose, lasix and aldactone  -Oncology following - consideration to resume hospice upon d/c  -Palliative care consulted - advanced care planning discussions in progress     #Hypoglycemia  Likely due to poor PO intake and poor liver function  -Continue hypoglycemia protocol prn  -c/w Dexamethasone PO  -Will do trial off D5W at this time.... if sugars drop again, may need to discuss if patient can receive gentle D5W while on hospice     #Viral PNA 2/2 COVID  -Not a candidate for Remdesivir given transaminitis  -One episode of exertional hypoxia, could be from atelectasis vs fluid-induced vs COVID... will repeat CXR today, Will not change long-term outcome unfortunately     #Severe protein calorie malnutrition  #Cachexia  -Continue multivitamin  -Supportive care    #Thrush  -c/w Nystatin    #Tachycardia  -Multifactorial including deconditioning and poor PO intake  -Encourage PO    #Prophylactic Measure  -DVT ppx: Lovenox    Patient declined my calling his family members today. I did speak to patient's brother, Manual, yesterday. Patient has a wife - but she did not  phone yesterday, Patient and I had an extensive conversation today about disease process, prognosis long-term, and consideration for DNR/DNI and its impact on quality of life. Patient to think about it. I asked palliative care team to follow-up today, and possibly show patient videos about CPR. In the meantime, will check CXR. Would plan for dispo as soon as possible... the question will be home with hospice, vs inpatient hospice... Patient also inquiring about going to [facility], unclear if this will be feasible in his condition. Long term prognosis is poor. Appreciate palliative care efforts in regards to his dispo planning. CM/SW follow-up. I personally spent  17 minutes face-to-face with patient discussing advance care planning and code status. Will consider MOLST eventually. 37M with recently diagnosed metastatic liver cancer to lung about 2 months ago, on home hospice, s/p recent abdominal Pleurx getting drained 3x/week presents with AMS and hypoglycemia. Admitted for metabolic encephalopathy, sepsis, and viral PNA from COVID.     #Metabolic and hepatic encephalopathy  #Metastatic liver Cancer  #Hyponatremia  #Transaminitis  #Elevated total bilirubin   #Elevated lactate due to liver cancer and poor PO intake  -Encourage PO intake  -GI following, c/w lactulose, lasix and aldactone  -Oncology following - consideration to resume hospice upon d/c  -Palliative care consulted - advanced care planning discussions in progress     #Hypoglycemia  Likely due to poor PO intake and poor liver function  -Continue hypoglycemia protocol prn  -c/w Dexamethasone PO  -Will do trial off D5W at this time.... if sugars drop again, may need to discuss if patient can receive gentle D5W while on hospice     #Viral PNA 2/2 COVID  #Acute hypoxic respiratory failure   -Not a candidate for Remdesivir given transaminitis  -One episode of exertional hypoxia, could be from atelectasis vs fluid-induced vs COVID... will repeat CXR today, Will not change long-term outcome unfortunately     #Severe protein calorie malnutrition  #Cachexia  -Continue multivitamin  -Supportive care    #Thrush  -c/w Nystatin    #Tachycardia  -Multifactorial including deconditioning and poor PO intake  -Encourage PO    #Prophylactic Measure  -DVT ppx: Lovenox    Patient declined my calling his family members today. I did speak to patient's brother, Hamlet, yesterday. Patient has a wife - but she did not  phone yesterday, Patient and I had an extensive conversation today about disease process, prognosis long-term, and consideration for DNR/DNI and its impact on quality of life. Patient to think about it. I asked palliative care team to follow-up today, and possibly show patient videos about CPR. In the meantime, will check CXR. Would plan for dispo as soon as possible... the question will be home with hospice, vs inpatient hospice... Patient also inquiring about going to [facility], unclear if this will be feasible in his condition. Long term prognosis is poor. Appreciate palliative care efforts in regards to his dispo planning. CM/SW follow-up. I personally spent  17 minutes face-to-face with patient discussing advance care planning and code status. Will consider MOLST eventually.

## 2023-01-06 NOTE — PROGRESS NOTE ADULT - SUBJECTIVE AND OBJECTIVE BOX
NARINDER LAKHANI, 37y Male  MRN: [id number]  ATTENDING: Rosemarie Barr    HPI:  37M, Slovak speaking only, reportedly diagnosed with metastatic hepatoma in October 2022 at [facility], admitted to home hospice, brought in by family to the ED at [place] with altered mentation and hypoglycemia.  Patient has been clinically deteriorating, has a Pleurx catheter in place and has therapeutic pleural fluid removal 3 times a week.  On arrival to the emergency room with abdominal pain patient had a CT abdomen did show hepatosplenomegaly, innumerable masses visualized within the lung parenchyma measuring up to 2.5 cm in size consistent with metastatic disease, innumerable masses in the hepatic parenchyma measuring up to 7.5 cm consistent with hepatic malignancy, portal hypertension, a small volume of ascites, L4 metastatic deposit.  CT head showed no acute pathology.  Evaluated by palliative care team in [place] deemed appropriate for hospice.  Patient has never been treated with systemic therapy.  Hematologic picture consistent with coagulopathy, secondary to hypersplenism/thrombocytopenia, elevated LFTs.  In the emergency room he was found encephalopathic, hypoglycemic, acute COVID +.  Medical oncology consulted to discuss prognosis.    MEDICATIONS:  ascorbic acid 500 milliGRAM(s) Oral daily  cholecalciferol 1000 Unit(s) Oral daily  dexAMETHasone     Tablet 2 milliGRAM(s) Oral two times a day  dextrose 50% Injectable 25 Gram(s) IV Push once  dextrose 50% Injectable 12.5 Gram(s) IV Push once  dextrose 50% Injectable 25 Gram(s) IV Push once  dextrose Oral Gel 15 Gram(s) Oral once  enoxaparin Injectable 40 milliGRAM(s) SubCutaneous every 24 hours  furosemide    Tablet 20 milliGRAM(s) Oral daily  glucagon  Injectable 1 milliGRAM(s) IntraMuscular once  HYDROmorphone   Tablet 4 milliGRAM(s) Oral every 4 hours PRN  lactulose Retention Enema 200 Gram(s) Rectal three times a day  melatonin 6 milliGRAM(s) Oral at bedtime  pantoprazole    Tablet 40 milliGRAM(s) Oral before breakfast  piperacillin/tazobactam IVPB.. 3.375 Gram(s) IV Intermittent every 8 hours  sodium chloride 0.9% Bolus 500 milliLiter(s) IV Bolus once  spironolactone 25 milliGRAM(s) Oral daily  zinc sulfate 220 milliGRAM(s) Oral daily    All other medications reviewed.    SUBJECTIVE:  Patient still in ER Hold; seen by palliative care.  Confused; cannot provide info.    VITALS:  T(C): 36.6 (01-04-23 @ 16:42), Max: 36.6 (01-04-23 @ 16:42)  T(F): 97.9 (01-04-23 @ 16:42), Max: 97.9 (01-04-23 @ 16:42)  HR: 105 (01-05-23 @ 05:10) (105 - 123)  BP: 126/89 (01-05-23 @ 05:10) (118/81 - 129/89)      PHYSICAL EXAM:  Constitutional: alert,cachectic  HEENT: normocephalic, anicteric sclerae, no mucositis or thrush  Respiratory: bilateral clear to auscultation anteriorly  Cardiovascular : S1, S2 regular, rhythmic, no murmurs, gallops or rubs  Abdomen: soft, distended, + normoactive BS, no palpable HS- megaly  Extremities: no tenderness;  -c/c/e, pulses equal bilaterally    LABS:  (01-04) WBC: 12.84 K/uL,Hemoglobin: 13.1 g/dL, Hematocrit: 40.7 %,  Platelet: 139 K/uL  (01-04) Na: 133 mmol/L ; K: 4.0 mmol/L ; BUN: 13 mg/dL ; Cr: 0.40 mg/dL.  PT/INR - ( 03 Jan 2023 05:45 )   PT: 31.7 sec;   INR: 2.71 ratio  PTT - ( 03 Jan 2023 05:45 )  PTT:40.4 sec  Ca    8.2<L>      03 Jan 2023 05:45  TPro  5.6<L>  /  Alb  2.3<L>  /  TBili  2.0<H>  /  DBili  x   /  AST  78<H>  /  ALT  78<H>  /  AlkPhos  199<H>  01-03  LIVER FUNCTIONS - ( 03 Jan 2023 05:45 )  Alb: 2.3 g/dL / Pro: 5.6 g/dL / ALK PHOS: 199 U/L / ALT: 78 U/L / AST: 78 U/L / GGT: x         Lipase, Serum: 23 U/L (01-03-23 @ 05:45)    RADIOLOGY:  ACC: [id number] EXAM:  CT ABDOMEN AND PELVIS                          PROCEDURE DATE:  01/03/2023      INTERPRETATION:  CLINICAL INFORMATION: Sepsis, abdominal pain. Reported   hepatic malignancy/cirrhosis.    COMPARISON: None.    CONTRAST/COMPLICATIONS:  IV Contrast: NONE  Oral Contrast: NONE  Complications: None reported at time of study completion    PROCEDURE:  CT of the Abdomen and Pelvis was performed.  Sagittal and coronal reformats were performed.    FINDINGS:  LOWER CHEST: Innumerable masses are visualized within the lung parenchyma   measuring up to 2.5 cm in size consistent with lung parenchymal   metastatic neoplastic disease.    LIVER: Hepatomegaly. Heterogeneous attenuation of the hepatic parenchyma   identified with innumerable masses within the hepatic parenchyma   measuring up to 7.5 cm consistent with reported hepatic malignancy.  BILE DUCTS: No intrahepatic or extrahepatic biliary ductal dilatation   identified.  GALLBLADDER: No gross gallstones or gallbladder wall thickening   identified.  SPLEEN: Splenomegaly.  PANCREAS: Within normal limits.  ADRENALS: Soft tissue nodularity measuring 1.9 cm is identified within   the right suprarenal region and a right adrenal nodule cannot be   excluded. The left adrenal appears unremarkable.  KIDNEYS/URETERS: No hydronephrosis. No renal calculi. No space-occupying   lesions of the renal parenchyma identified.    BLADDER: Minimally distended, precluding assessment.  REPRODUCTIVE ORGANS: The prostate appears unremarkable.    BOWEL: No evidence for mechanical bowel obstruction. Pancolonic wall   thickening is suggested raising suspicion for colitis. No evidence for   acute appendicitis.  PERITONEUM: Indwelling peritoneal change catheter. A small volume of   ascites isidentified with fluid noted within the right inguinal canal.   Diffuse stranding of the mesentery noted which may be related to the   presence of ascites; a 2.2 cm soft tissue nodule is identified posterior   to the cecum and a mesenteric neoplastic deposit cannot be excluded. No   free intraperitoneal air.  VESSELS: Recanalized paraumbilical vein and perisplenic varices likely   related to portal hypertension.  RETROPERITONEUM/LYMPH NODES: No lymphadenopathy.  ABDOMINAL WALL: Within normal limits.  BONES: A region of circumscribed lucency is identified within the   anterior/inferior aspect of the L4 vertebral body, a metastatic   neoplastic deposit cannot be excluded.    IMPRESSION:  1. Pancolonic wall thickening is suggested raising suspicion for colitis.  2. Innumerable masses are visualized within the lung parenchyma measuring   up to 2.5 cm in size consistent with lung parenchymal metastatic   neoplastic disease.  3. Hepatomegaly. Heterogeneous attenuation of the hepatic parenchyma   identified with innumerable masses within the hepatic parenchyma   measuring up to 7.5 cm consistent with reported hepatic malignancy.   Splenomegaly. Portal hypertension.  4. Indwelling peritoneal change catheter. A small volume of ascites is   identified with fluid noted within the right inguinal canal. Diffuse   stranding of the mesentery noted which may be related to the presence of   ascites; a 2.2 cm soft tissue nodule is identified posterior to the cecum   and a mesenteric neoplastic deposit cannot be excluded.  5. A region of circumscribed lucency is identified within the   anterior/inferior aspect of the L4 vertebral body, a metastatic   neoplastic deposit cannot be excluded.

## 2023-01-06 NOTE — PROGRESS NOTE ADULT - SUBJECTIVE AND OBJECTIVE BOX
Patient is a 37y old  Male who presents with a chief complaint of hypoglycemia, AMS (06 Jan 2023 07:46)     ID # [id number] utilized    Patient seen and examined at bedside. Overnight, per RN, patient dropped his O2 to 84% while turning to receive an enema. Other than that, no acute issues. Patient denies any complaints. No SOB. No chest pain.     ALLERGIES:  No Known Allergies    MEDICATIONS  (STANDING):  ascorbic acid 500 milliGRAM(s) Oral daily  cholecalciferol 1000 Unit(s) Oral daily  dexAMETHasone     Tablet 2 milliGRAM(s) Oral two times a day  dextrose 5% + sodium chloride 0.9%. 1000 milliLiter(s) (70 mL/Hr) IV Continuous <Continuous>  dextrose 50% Injectable 25 Gram(s) IV Push once  dextrose 50% Injectable 12.5 Gram(s) IV Push once  dextrose 50% Injectable 25 Gram(s) IV Push once  dextrose Oral Gel 15 Gram(s) Oral once  enoxaparin Injectable 40 milliGRAM(s) SubCutaneous every 24 hours  furosemide    Tablet 20 milliGRAM(s) Oral daily  glucagon  Injectable 1 milliGRAM(s) IntraMuscular once  lactulose Retention Enema 200 Gram(s) Rectal three times a day  melatonin 6 milliGRAM(s) Oral at bedtime  nystatin    Suspension [id number] Unit(s) Oral four times a day  pantoprazole    Tablet 40 milliGRAM(s) Oral before breakfast  sodium chloride 0.9% Bolus 500 milliLiter(s) IV Bolus once  spironolactone 25 milliGRAM(s) Oral daily  zinc sulfate 220 milliGRAM(s) Oral daily    MEDICATIONS  (PRN):  HYDROmorphone   Tablet 4 milliGRAM(s) Oral every 4 hours PRN Severe Pain (7 - 10)    Vital Signs Last 24 Hrs  T(F): 97.8 (06 Jan 2023 05:08), Max: 98.4 (05 Jan 2023 16:36)  HR: 105 (06 Jan 2023 05:08) (100 - 125)  BP: 125/83 (06 Jan 2023 05:08) (115/79 - 129/93)  RR: 15 (06 Jan 2023 05:08) (15 - 17)  SpO2: 92% (06 Jan 2023 05:10) (84% - 99%)  I&O's Summary    06 Jan 2023 07:01  -  06 Jan 2023 10:45  --------------------------------------------------------  IN: 0 mL / OUT: 230 mL / NET: -230 mL      PHYSICAL EXAM:  General: NAD, A/O x 3, cachectic, fair concentration  ENT: No gross hearing impairment, dry mucous membranes, + thrush  Neck: Supple, No JVD  Lungs: Clear to auscultation bilaterally limited to poor effort, good air entry, non-labored breathing  Cardio: tachycardic, S1/S2, No murmur  Abdomen: Soft, Nontender, Nondistended; Bowel sounds present  Extremities: No calf tenderness, No cyanosis, No pitting edema  Psych: Appropriate mood and affect    LABS:                        13.5   14.05 )-----------( 152      ( 06 Jan 2023 06:15 )             41.3     01-06    137  |  104  |  11  ----------------------------<  109  3.5   |  23  |  0.60    Ca    8.6      06 Jan 2023 06:15  Phos  2.9     01-06  Mg     1.7     01-06    TPro  x   /  Alb  x   /  TBili  3.3  /  DBili  x   /  AST  x   /  ALT  x   /  AlkPhos  x   01-05      Lipase, Serum: 23 U/L (01-03-23 @ 05:45)      PT/INR - ( 05 Jan 2023 05:05 )   PT: 28.6 sec;   INR: 2.44 ratio           Lactate, Blood: 2.5 mmol/L (01-05 @ 05:05)  Lactate, Blood: 4.5 mmol/L (01-05 @ 00:10)  Lactate, Blood: 5.0 mmol/L (01-03 @ 21:00)  Lactate, Blood: 5.1 mmol/L (01-03 @ 17:25)  Lactate, Blood: 5.6 mmol/L (01-03 @ 12:30)    POCT Blood Glucose.: 93 mg/dL (06 Jan 2023 06:33)  POCT Blood Glucose.: 111 mg/dL (06 Jan 2023 00:30)  POCT Blood Glucose.: 188 mg/dL (05 Jan 2023 19:03)  POCT Blood Glucose.: 287 mg/dL (05 Jan 2023 15:56)  POCT Blood Glucose.: 38 mg/dL (05 Jan 2023 15:42)  POCT Blood Glucose.: 112 mg/dL (05 Jan 2023 12:03)    Culture - Blood (collected 03 Jan 2023 07:18)  Source: .Blood Blood  Preliminary Report (04 Jan 2023 13:02):    No growth to date.    Culture - Blood (collected 03 Jan 2023 07:18)  Source: .Blood Blood  Preliminary Report (04 Jan 2023 13:02):    No growth to date.

## 2023-01-07 NOTE — PROGRESS NOTE ADULT - SUBJECTIVE AND OBJECTIVE BOX
Patient is a 37y old  Male who presents with a chief complaint of hypoglycemia, AMS (07 Jan 2023 07:25)    Patient seen and examined at bedside. No overnight events reported. Used  ID [id number]. Patient sitting up in bed, family at bedside. No complaints at this time. On supplemental O2. Eating breakfast.     ALLERGIES:  No Known Allergies    MEDICATIONS  (STANDING):  ascorbic acid 500 milliGRAM(s) Oral daily  cholecalciferol 1000 Unit(s) Oral daily  dexAMETHasone     Tablet 6 milliGRAM(s) Oral daily  dextrose 50% Injectable 25 Gram(s) IV Push once  dextrose 50% Injectable 12.5 Gram(s) IV Push once  dextrose 50% Injectable 25 Gram(s) IV Push once  dextrose Oral Gel 15 Gram(s) Oral once  enoxaparin Injectable 40 milliGRAM(s) SubCutaneous every 24 hours  furosemide    Tablet 20 milliGRAM(s) Oral daily  glucagon  Injectable 1 milliGRAM(s) IntraMuscular once  lactulose Retention Enema 200 Gram(s) Rectal three times a day  melatonin 6 milliGRAM(s) Oral at bedtime  nystatin    Suspension [id number] Unit(s) Oral four times a day  pantoprazole    Tablet 40 milliGRAM(s) Oral before breakfast  spironolactone 25 milliGRAM(s) Oral daily  zinc sulfate 220 milliGRAM(s) Oral daily    MEDICATIONS  (PRN):  HYDROmorphone   Tablet 4 milliGRAM(s) Oral every 4 hours PRN Severe Pain (7 - 10)    Vital Signs Last 24 Hrs  T(F): 98.2 (07 Jan 2023 05:08), Max: 98.2 (07 Jan 2023 05:08)  HR: 107 (07 Jan 2023 05:08) (107 - 116)  BP: 126/81 (07 Jan 2023 05:08) (118/81 - 126/85)  RR: 16 (07 Jan 2023 05:08) (16 - 19)  SpO2: 99% (07 Jan 2023 05:08) (85% - 99%)    I&O's Summary  06 Jan 2023 07:01  -  07 Jan 2023 07:00  --------------------------------------------------------  IN: 360 mL / OUT: 530 mL / NET: -170 mL    PHYSICAL EXAM:  General: NAD, A/O x 3, chronically ill appearing, cachectic  ENT: No gross hearing impairment, dry mucous membranes, no thrush  Neck: Supple, No JVD  Lungs: Diminished to bases on auscultation bilaterally, good air entry, non-labored breathing  Cardio: RRR, S1/S2, No murmur  Abdomen: Soft, distended, nontender, abd catheter   Extremities: No calf tenderness, No cyanosis, No pitting edema  Psych: Appropriate mood and affect    LABS:                        13.5   14.05 )-----------( 152      ( 06 Jan 2023 06:15 )             41.3     01-06    137  |  104  |  11  ----------------------------<  109  3.5   |  23  |  0.60    Ca    8.6      06 Jan 2023 06:15  Phos  2.9     01-06  Mg     1.7     01-06    TPro  x   /  Alb  x   /  TBili  3.3  /  DBili  x   /  AST  x   /  ALT  x   /  AlkPhos  x   01-05    PT/INR - ( 05 Jan 2023 05:05 )   PT: 28.6 sec;   INR: 2.44 ratio        Lactate, Blood: 2.5 mmol/L (01-05 @ 05:05)  Lactate, Blood: 4.5 mmol/L (01-05 @ 00:10)    POCT Blood Glucose.: 89 mg/dL (07 Jan 2023 06:09)  POCT Blood Glucose.: 100 mg/dL (06 Jan 2023 23:57)  POCT Blood Glucose.: 108 mg/dL (06 Jan 2023 12:03)    Culture - Blood (collected 03 Jan 2023 07:18)  Source: .Blood Blood  Preliminary Report (04 Jan 2023 13:02):    No growth to date.    Culture - Blood (collected 03 Jan 2023 07:18)  Source: .Blood Blood  Preliminary Report (04 Jan 2023 13:02):    No growth to date.        RADIOLOGY & ADDITIONAL TESTS:    Care Discussed with Consultants/Other Providers:

## 2023-01-07 NOTE — PROGRESS NOTE ADULT - ASSESSMENT
37M with recently diagnosed metastatic liver cancer to lung about 2 months ago, on home hospice, s/p recent abdominal Pleurx getting drained 3x/week presents with AMS and hypoglycemia. Admitted for metabolic encephalopathy, sepsis, and viral PNA from COVID.     #Metabolic and hepatic encephalopathy - improved   #Metastatic liver Cancer  #Hyponatremia - resolved   #Transaminitis  #Elevated total bilirubin   #Elevated lactate due to liver cancer and poor PO intake  - Encourage PO intake  - GI following, c/w lactulose, lasix and aldactone  - Oncology following - consideration to resume hospice upon d/c  - Palliative care consulted - advanced care planning discussions in progress     #Hypoglycemia  - Likely due to poor PO intake and poor liver function  - Continue hypoglycemia protocol prn  - c/w Dexamethasone PO  - Now off D5W. If sugars drop again, may need to discuss if patient can receive gentle D5W while on hospice     #Viral PNA 2/2 COVID  #Acute hypoxic respiratory failure   - Not a candidate for Remdesivir given transaminitis  - Two episodes exertional hypoxia, repeat CXR appears unchanged, continue supplemental O2    #Severe protein calorie malnutrition  #Cachexia  - Continue multivitamin  - Supportive care    #Thrush  - c/w Nystatin    #Tachycardia  - Multifactorial including deconditioning and poor PO intake  - Encourage PO    #Prophylactic Measure  - DVT ppx: Lovenox     37M with recently diagnosed metastatic liver cancer to lung about 2 months ago, on home hospice, s/p recent abdominal Pleurx getting drained 3x/week presents with AMS and hypoglycemia. Admitted for metabolic encephalopathy, sepsis, and viral PNA from COVID.     #Metabolic and hepatic encephalopathy - improved   #Metastatic liver Cancer  #Hyponatremia - resolved   #Transaminitis  #Elevated total bilirubin   #Elevated lactate due to liver cancer and poor PO intake  - Encourage PO intake  - GI following, c/w lactulose enemas, lasix and aldactone  - Oncology following - consideration to resume hospice upon d/c  - Palliative care consulted - advanced care planning discussions in progress     #Hypoglycemia  - Likely due to poor PO intake and poor liver function  - Continue hypoglycemia protocol prn  - c/w Dexamethasone PO  - Now off D5W. If sugars drop again, may need to discuss if patient can receive gentle D5W while on hospice     #Viral PNA 2/2 COVID  #Acute hypoxic respiratory failure   - Not a candidate for Remdesivir given transaminitis  - Two episodes exertional hypoxia, repeat CXR appears unchanged, continue supplemental O2    #Severe protein calorie malnutrition  #Cachexia  - Continue multivitamin  - Supportive care    #Thrush  - c/w Nystatin    #Tachycardia  - Multifactorial including deconditioning and poor PO intake  - Encourage PO    #Prophylactic Measure  - DVT ppx: Lovenox    GOC: Conversations ongoing, this morning patient and his wife stated that they would prefer to wait until patient's brother arrives to discuss GOC further/complete paperwork    Dispo: pending above     Updated wife at bedside       37M with recently diagnosed metastatic liver cancer to lung about 2 months ago, on home hospice, s/p recent abdominal Pleurx getting drained 3x/week presents with AMS and hypoglycemia. Admitted for metabolic encephalopathy, sepsis, and viral PNA from COVID.     #Metabolic and hepatic encephalopathy - improved   #Metastatic liver Cancer  #Hyponatremia - resolved   #Transaminitis  #Elevated total bilirubin   #Elevated lactate due to liver cancer and poor PO intake  - Encourage PO intake  - GI following, c/w lactulose enemas, lasix and aldactone  - Oncology following - consideration to resume hospice upon d/c  - Palliative care consulted - advanced care planning discussions in progress     #Hypoglycemia  - Likely due to poor PO intake and poor liver function  - Continue hypoglycemia protocol prn  - c/w Dexamethasone PO  - Now off D5W. If sugars drop again, may need to discuss if patient can receive gentle D5W while on hospice     #Viral PNA 2/2 COVID  #Acute hypoxic respiratory failure   - Not a candidate for Remdesivir given transaminitis  - Two episodes exertional hypoxia, repeat CXR appears unchanged, continue supplemental O2    #Severe protein calorie malnutrition  #Cachexia  - Continue multivitamin  - Supportive care    #Thrush  - c/w Nystatin    #Tachycardia  - Multifactorial including deconditioning and poor PO intake  - Encourage PO    #Prophylactic Measure  - DVT ppx: Lovenox    GOC: Conversations ongoing, this morning patient and his wife stated that they would prefer to wait until patient's brother arrives to discuss GOC further/complete paperwork.     Dispo: Pending above. Discussed with case management and social work in AM rounds. ?Potential home hospice pending ongoing GOC discussions. As patient undocumented/no insurance, home O2 or VIJAY may be an impossibility. Will continue to work with team on plan     Updated wife at bedside

## 2023-01-08 NOTE — PROGRESS NOTE ADULT - ASSESSMENT
37M with recently diagnosed metastatic liver cancer to lung about 2 months ago, on home hospice, s/p recent abdominal Pleurx getting drained 3x/week presents with AMS and hypoglycemia. Admitted for metabolic encephalopathy, sepsis, and viral PNA from COVID.     #Metabolic and hepatic encephalopathy - improved   #Metastatic liver Cancer  #Hyponatremia - resolved   #Transaminitis  #Elevated total bilirubin   #Elevated lactate due to liver cancer and poor PO intake  - Encourage PO intake  - GI following, c/w lactulose enemas, lasix and aldactone  - Oncology following - consideration to resume hospice upon d/c  - Palliative care consulted - advanced care planning discussions in progress     #Hypoglycemia  - Likely due to poor PO intake and poor liver function  - Continue hypoglycemia protocol prn  - c/w Dexamethasone PO  - Was off D5W - will resume as hypoglycemic. Will request endocrine consult      #Viral PNA 2/2 COVID  #Acute hypoxic respiratory failure   - Not a candidate for Remdesivir given transaminitis  - Two episodes exertional hypoxia, repeat CXR appears unchanged, continue supplemental O2    #Severe protein calorie malnutrition  #Cachexia  - Continue multivitamin  - Supportive care    #Thrush  - c/w Nystatin    #Tachycardia  - Multifactorial including deconditioning and poor PO intake  - Encourage PO    #Prophylactic Measure  - DVT ppx: Lovenox    GOC: Conversations ongoing, this morning patient and his wife stated that they would prefer to wait until patient's brother arrives to discuss GOC further/complete paperwork.     Dispo: Pending above. Discussed with case management and social work in AM rounds. ?Potential home hospice pending ongoing GOC discussions. As patient undocumented/no insurance, home O2 or VIJAY may be an impossibility. Will continue to work with team on plan     Updated wife at bedside       37M with recently diagnosed metastatic liver cancer to lung about 2 months ago, on home hospice, s/p recent abdominal Pleurx getting drained 3x/week presents with AMS and hypoglycemia. Admitted for metabolic encephalopathy, sepsis, and viral PNA from COVID.     #Metabolic and hepatic encephalopathy - improved   #Metastatic liver Cancer  #Hyponatremia - resolved   #Transaminitis  #Elevated total bilirubin   #Elevated lactate due to liver cancer and poor PO intake  - Encourage PO intake  - GI following, c/w lactulose enemas, lasix and aldactone  - Oncology following - consideration to resume hospice upon d/c  - Palliative care consulted - advanced care planning discussions in progress   - R upper abdominal pleux - patient reports that it is supposed to be drained q3 days, last drained prior to admission. RN to drain  - Will request chaplaincy consult. Emotional support provided    #Hypoglycemia  - Likely due to poor PO intake and poor liver function  - Continue hypoglycemia protocol prn  - c/w Dexamethasone PO  - Was off D5W - will resume as hypoglycemic. Will request endocrine consult - d/w Dr Kraft who will see patient tomorrow     #Viral PNA 2/2 COVID  #Acute hypoxic respiratory failure   - Not a candidate for Remdesivir given transaminitis  - Two episodes exertional hypoxia, repeat CXR appears unchanged, continue supplemental O2    #Severe protein calorie malnutrition  #Cachexia  - Continue multivitamin  - Supportive care    #Thrush  - c/w Nystatin    #Tachycardia  - Multifactorial including deconditioning and poor PO intake  - Encourage PO    #Prophylactic Measure  - DVT ppx: Lovenox    GOC: DNR/I, MOLST in chart    Dispo: Endocrine consult pending. Anticipate eventual home hospice     1/8: Updated wife Kendra at bedside

## 2023-01-08 NOTE — PROGRESS NOTE ADULT - SUBJECTIVE AND OBJECTIVE BOX
Patient is a 37y old  Male who presents with a chief complaint of hypoglycemia, AMS (08 Jan 2023 07:20)    Patient seen and examined at bedside. Now on room air. Patient states that he is feeling better. Was hypoglycemic this morning     ALLERGIES:  No Known Allergies    MEDICATIONS  (STANDING):  ascorbic acid 500 milliGRAM(s) Oral daily  cholecalciferol 1000 Unit(s) Oral daily  dexAMETHasone     Tablet 6 milliGRAM(s) Oral daily  enoxaparin Injectable 40 milliGRAM(s) SubCutaneous every 24 hours  furosemide    Tablet 20 milliGRAM(s) Oral daily  lactulose Retention Enema 200 Gram(s) Rectal two times a day  melatonin 6 milliGRAM(s) Oral at bedtime  nystatin    Suspension 719568 Unit(s) Oral four times a day  pantoprazole    Tablet 40 milliGRAM(s) Oral before breakfast  spironolactone 25 milliGRAM(s) Oral daily  zinc sulfate 220 milliGRAM(s) Oral daily    MEDICATIONS  (PRN):  HYDROmorphone   Tablet 4 milliGRAM(s) Oral every 4 hours PRN Severe Pain (7 - 10)    Vital Signs Last 24 Hrs  T(F): 97.3 (08 Jan 2023 05:50), Max: 99 (07 Jan 2023 19:50)  HR: 123 (08 Jan 2023 05:50) (108 - 123)  BP: 137/89 (08 Jan 2023 05:50) (116/75 - 137/89)  RR: 20 (08 Jan 2023 05:50) (16 - 20)  SpO2: 93% (08 Jan 2023 05:50) (93% - 99%)    I&O's Summary  07 Jan 2023 07:01  -  08 Jan 2023 07:00  --------------------------------------------------------  IN: 700 mL / OUT: 0 mL / NET: 700 mL    08 Jan 2023 07:01  -  08 Jan 2023 09:35  --------------------------------------------------------  IN: 150 mL / OUT: 0 mL / NET: 150 mL    PHYSICAL EXAM:  General: NAD, A/O x 3, chronically ill appearing, cachectic  ENT: No gross hearing impairment, dry mucous membranes, no thrush  Neck: Supple, No JVD  Lungs: Diminished to bases on auscultation bilaterally, good air entry, non-labored breathing  Cardio: RRR, S1/S2, No murmur  Abdomen: Soft, distended, nontender, abd catheter   Extremities: No calf tenderness, No cyanosis, No pitting edema  Psych: Appropriate mood and affect    LABS:                        13.1   16.81 )-----------( 126      ( 08 Jan 2023 06:25 )             39.6     01-08    132  |  98  |  15  ----------------------------<  241  3.8   |  25  |  0.54    Ca    8.8      08 Jan 2023 06:25  Phos  2.9     01-06  Mg     1.7     01-06    TPro  5.6  /  Alb  2.1  /  TBili  2.5  /  DBili  x   /  AST  106  /  ALT  126  /  AlkPhos  245  01-08    PT/INR - ( 08 Jan 2023 06:25 )   PT: 29.1 sec;   INR: 2.49 ratio      POCT Blood Glucose.: 304 mg/dL (08 Jan 2023 05:59)  POCT Blood Glucose.: 39 mg/dL (08 Jan 2023 05:35)  POCT Blood Glucose.: 43 mg/dL (08 Jan 2023 05:32)  POCT Blood Glucose.: 90 mg/dL (07 Jan 2023 23:30)  POCT Blood Glucose.: 77 mg/dL (07 Jan 2023 17:54)    Culture - Urine (collected 06 Jan 2023 20:04)  Source: Clean Catch Clean Catch (Midstream)  Final Report (07 Jan 2023 20:19):    No growth    Culture - Blood (collected 03 Jan 2023 07:18)  Source: .Blood Blood  Preliminary Report (04 Jan 2023 13:02):    No growth to date.    Culture - Blood (collected 03 Jan 2023 07:18)  Source: .Blood Blood  Preliminary Report (04 Jan 2023 13:02):    No growth to date.        RADIOLOGY & ADDITIONAL TESTS:    Care Discussed with Consultants/Other Providers:    Patient is a 37y old  Male who presents with a chief complaint of hypoglycemia, AMS (08 Jan 2023 07:20)    Patient seen and examined at bedside. Now on room air. Patient states that he is feeling better. Was hypoglycemic this morning. Spoke with patient using  ID [id number]    ALLERGIES:  No Known Allergies    MEDICATIONS  (STANDING):  ascorbic acid 500 milliGRAM(s) Oral daily  cholecalciferol 1000 Unit(s) Oral daily  dexAMETHasone     Tablet 6 milliGRAM(s) Oral daily  enoxaparin Injectable 40 milliGRAM(s) SubCutaneous every 24 hours  furosemide    Tablet 20 milliGRAM(s) Oral daily  lactulose Retention Enema 200 Gram(s) Rectal two times a day  melatonin 6 milliGRAM(s) Oral at bedtime  nystatin    Suspension [id number] Unit(s) Oral four times a day  pantoprazole    Tablet 40 milliGRAM(s) Oral before breakfast  spironolactone 25 milliGRAM(s) Oral daily  zinc sulfate 220 milliGRAM(s) Oral daily    MEDICATIONS  (PRN):  HYDROmorphone   Tablet 4 milliGRAM(s) Oral every 4 hours PRN Severe Pain (7 - 10)    Vital Signs Last 24 Hrs  T(F): 97.3 (08 Jan 2023 05:50), Max: 99 (07 Jan 2023 19:50)  HR: 123 (08 Jan 2023 05:50) (108 - 123)  BP: 137/89 (08 Jan 2023 05:50) (116/75 - 137/89)  RR: 20 (08 Jan 2023 05:50) (16 - 20)  SpO2: 93% (08 Jan 2023 05:50) (93% - 99%)    I&O's Summary  07 Jan 2023 07:01  -  08 Jan 2023 07:00  --------------------------------------------------------  IN: 700 mL / OUT: 0 mL / NET: 700 mL    08 Jan 2023 07:01  -  08 Jan 2023 09:35  --------------------------------------------------------  IN: 150 mL / OUT: 0 mL / NET: 150 mL    PHYSICAL EXAM:  General: NAD, A/O x 3, chronically ill appearing, cachectic  ENT: No gross hearing impairment, dry mucous membranes, no thrush  Neck: Supple, No JVD  Lungs: Diminished to bases on auscultation bilaterally, good air entry, non-labored breathing  Cardio: RRR, S1/S2, No murmur  Abdomen: Soft, distended, nontender, abd catheter   Extremities: No calf tenderness, No cyanosis, No pitting edema  Psych: Appropriate mood and affect    LABS:                        13.1   16.81 )-----------( 126      ( 08 Jan 2023 06:25 )             39.6     01-08    132  |  98  |  15  ----------------------------<  241  3.8   |  25  |  0.54    Ca    8.8      08 Jan 2023 06:25  Phos  2.9     01-06  Mg     1.7     01-06    TPro  5.6  /  Alb  2.1  /  TBili  2.5  /  DBili  x   /  AST  106  /  ALT  126  /  AlkPhos  245  01-08    PT/INR - ( 08 Jan 2023 06:25 )   PT: 29.1 sec;   INR: 2.49 ratio      POCT Blood Glucose.: 304 mg/dL (08 Jan 2023 05:59)  POCT Blood Glucose.: 39 mg/dL (08 Jan 2023 05:35)  POCT Blood Glucose.: 43 mg/dL (08 Jan 2023 05:32)  POCT Blood Glucose.: 90 mg/dL (07 Jan 2023 23:30)  POCT Blood Glucose.: 77 mg/dL (07 Jan 2023 17:54)    Culture - Urine (collected 06 Jan 2023 20:04)  Source: Clean Catch Clean Catch (Midstream)  Final Report (07 Jan 2023 20:19):    No growth    Culture - Blood (collected 03 Jan 2023 07:18)  Source: .Blood Blood  Preliminary Report (04 Jan 2023 13:02):    No growth to date.    Culture - Blood (collected 03 Jan 2023 07:18)  Source: .Blood Blood  Preliminary Report (04 Jan 2023 13:02):    No growth to date.        RADIOLOGY & ADDITIONAL TESTS:    Care Discussed with Consultants/Other Providers:

## 2023-01-08 NOTE — PROVIDER CONTACT NOTE (HYPOGLYCEMIA EVENT) - NS PROVIDER CONTACT RECOMMEND-HYPO
SHOAIB Anaya notified. Pt poor po intake. Dextrose 50% 25 grams given IV Push. D5W 75 mL/hr ordered.

## 2023-01-08 NOTE — PROVIDER CONTACT NOTE (HYPOGLYCEMIA EVENT) - NS PROVIDER CONTACT BACKGROUND-HYPO
Age: 37y    Gender: Male    POCT Blood Glucose:  304 mg/dL (01-08-23 @ 05:59)  39 mg/dL (01-08-23 @ 05:35)  43 mg/dL (01-08-23 @ 05:32)  90 mg/dL (01-07-23 @ 23:30)  77 mg/dL (01-07-23 @ 17:54)      eMAR:  dexAMETHasone     Tablet   6 milliGRAM(s) Oral (01-08-23 @ 06:00)    dextrose 50% Injectable   25 Gram(s) IV Push (01-08-23 @ 06:02)

## 2023-01-09 NOTE — PROVIDER CONTACT NOTE (HYPOGLYCEMIA EVENT) - NS PROVIDER CONTACT CONTRIBUTING FACTORS OF EPISODE
Poor oral intake within the last 24 hours/Previous finger stick less than 100 mg/dL
Poor oral intake within the last 24 hours/Liver failure
Poor oral intake within the last 24 hours/Liver failure
Poor oral intake within the last 24 hours/Previous finger stick less than 100 mg/dL/Liver failure

## 2023-01-09 NOTE — PROGRESS NOTE ADULT - SUBJECTIVE AND OBJECTIVE BOX
Patient is a 37y old  Male who presents with a chief complaint of hypoglycemia, AMS (08 Jan 2023 07:20)      Patient seen and examined at bedside.    ALLERGIES:  No Known Allergies    MEDICATIONS  (STANDING):  ascorbic acid 500 milliGRAM(s) Oral daily  cholecalciferol 1000 Unit(s) Oral daily  dexAMETHasone     Tablet 6 milliGRAM(s) Oral daily  dextrose 5%. 1000 milliLiter(s) (75 mL/Hr) IV Continuous <Continuous>  dextrose 50% Injectable 25 Gram(s) IV Push once  dextrose 50% Injectable 12.5 Gram(s) IV Push once  dextrose 50% Injectable 25 Gram(s) IV Push once  dextrose Oral Gel 15 Gram(s) Oral once  enoxaparin Injectable 40 milliGRAM(s) SubCutaneous every 24 hours  furosemide    Tablet 20 milliGRAM(s) Oral daily  glucagon  Injectable 1 milliGRAM(s) IntraMuscular once  lactulose Retention Enema 200 Gram(s) Rectal two times a day  melatonin 6 milliGRAM(s) Oral at bedtime  nystatin    Suspension [id number] Unit(s) Oral four times a day  pantoprazole    Tablet 40 milliGRAM(s) Oral before breakfast  spironolactone 25 milliGRAM(s) Oral daily  zinc sulfate 220 milliGRAM(s) Oral daily    MEDICATIONS  (PRN):  HYDROmorphone   Tablet 4 milliGRAM(s) Oral every 4 hours PRN Severe Pain (7 - 10)    Vital Signs Last 24 Hrs  T(F): 97.8 (09 Jan 2023 05:46), Max: 97.9 (08 Jan 2023 16:34)  HR: 126 (09 Jan 2023 05:46) (115 - 126)  BP: 134/93 (09 Jan 2023 05:46) (134/93 - 144/97)  RR: 20 (09 Jan 2023 05:46) (18 - 20)  SpO2: 94% (09 Jan 2023 05:46) (94% - 95%)  I&O's Summary    07 Jan 2023 07:01  -  08 Jan 2023 07:00  --------------------------------------------------------  IN: 700 mL / OUT: 0 mL / NET: 700 mL    08 Jan 2023 07:01  -  09 Jan 2023 06:53  --------------------------------------------------------  IN: 2575 mL / OUT: 550 mL / NET: 2025 mL      PHYSICAL EXAM:  General: NAD, A/O x 3  ENT: MMM  Neck: Supple, No JVD  Lungs: Clear to auscultation bilaterally, Non labored breathing   Cardio: RRR, S1/S2, No murmurs  Abdomen: Soft, Nontender, Nondistended; Bowel sounds present  Extremities: No calf tenderness, No pitting edema    LABS:                        13.1   16.81 )-----------( 126      ( 08 Jan 2023 06:25 )             39.6     01-08    132  |  98  |  15  ----------------------------<  241  3.8   |  25  |  0.54    Ca    8.8      08 Jan 2023 06:25    TPro  5.6  /  Alb  2.1  /  TBili  2.5  /  DBili  x   /  AST  106  /  ALT  126  /  AlkPhos  245  01-08      PT/INR - ( 08 Jan 2023 06:25 )   PT: 29.1 sec;   INR: 2.49 ratio                               POCT Blood Glucose.: 68 mg/dL (09 Jan 2023 06:28)  POCT Blood Glucose.: 56 mg/dL (09 Jan 2023 06:06)  POCT Blood Glucose.: 62 mg/dL (09 Jan 2023 06:03)  POCT Blood Glucose.: 101 mg/dL (08 Jan 2023 21:41)  POCT Blood Glucose.: 135 mg/dL (08 Jan 2023 17:58)  POCT Blood Glucose.: 80 mg/dL (08 Jan 2023 13:01)          Culture - Urine (collected 06 Jan 2023 20:04)  Source: Clean Catch Clean Catch (Midstream)  Final Report (07 Jan 2023 20:19):    No growth    Culture - Blood (collected 03 Jan 2023 07:18)  Source: .Blood Blood  Final Report (08 Jan 2023 13:01):    No Growth Final    Culture - Blood (collected 03 Jan 2023 07:18)  Source: .Blood Blood  Final Report (08 Jan 2023 13:01):    No Growth Final        RADIOLOGY & ADDITIONAL TESTS:    Care Discussed with Consultants/Other Providers:    Patient is a 37y old  Male who presents with a chief complaint of hypoglycemia, AMS (08 Jan 2023 07:20)      Patient seen and examined at bedside.  Pt with tachycardia and hypoglycemic -overnight currently on D5 at 75cc/hr  Brother at bedside     ALLERGIES:  No Known Allergies    MEDICATIONS  (STANDING):  ascorbic acid 500 milliGRAM(s) Oral daily  cholecalciferol 1000 Unit(s) Oral daily  dexAMETHasone     Tablet 6 milliGRAM(s) Oral daily  dextrose 5%. 1000 milliLiter(s) (75 mL/Hr) IV Continuous <Continuous>  dextrose 50% Injectable 25 Gram(s) IV Push once  dextrose 50% Injectable 12.5 Gram(s) IV Push once  dextrose 50% Injectable 25 Gram(s) IV Push once  dextrose Oral Gel 15 Gram(s) Oral once  enoxaparin Injectable 40 milliGRAM(s) SubCutaneous every 24 hours  furosemide    Tablet 20 milliGRAM(s) Oral daily  glucagon  Injectable 1 milliGRAM(s) IntraMuscular once  lactulose Retention Enema 200 Gram(s) Rectal two times a day  melatonin 6 milliGRAM(s) Oral at bedtime  nystatin    Suspension [id number] Unit(s) Oral four times a day  pantoprazole    Tablet 40 milliGRAM(s) Oral before breakfast  spironolactone 25 milliGRAM(s) Oral daily  zinc sulfate 220 milliGRAM(s) Oral daily    MEDICATIONS  (PRN):  HYDROmorphone   Tablet 4 milliGRAM(s) Oral every 4 hours PRN Severe Pain (7 - 10)    Vital Signs Last 24 Hrs  T(F): 97.8 (09 Jan 2023 05:46), Max: 97.9 (08 Jan 2023 16:34)  HR: 126 (09 Jan 2023 05:46) (115 - 126)  BP: 134/93 (09 Jan 2023 05:46) (134/93 - 144/97)  RR: 20 (09 Jan 2023 05:46) (18 - 20)  SpO2: 94% (09 Jan 2023 05:46) (94% - 95%)  I&O's Summary    07 Jan 2023 07:01  -  08 Jan 2023 07:00  --------------------------------------------------------  IN: 700 mL / OUT: 0 mL / NET: 700 mL    08 Jan 2023 07:01  -  09 Jan 2023 06:53  --------------------------------------------------------  IN: 2575 mL / OUT: 550 mL / NET: 2025 mL      PHYSICAL EXAM:  General:36 y/o emeciated male in NAD, A/O x 3  ENT: MMM  Neck: Supple, No JVD  Lungs: Clear to auscultation bilaterally, Non labored breathing   Cardio: tachycardia RR, S1/S2, No murmurs  Abdomen: firm , Nontender, distended; Bowel sounds present  Extremities: No calf tenderness, No pitting edema    LABS:                        13.1   16.81 )-----------( 126      ( 08 Jan 2023 06:25 )             39.6     01-08    132  |  98  |  15  ----------------------------<  241  3.8   |  25  |  0.54    Ca    8.8      08 Jan 2023 06:25    TPro  5.6  /  Alb  2.1  /  TBili  2.5  /  DBili  x   /  AST  106  /  ALT  126  /  AlkPhos  245  01-08      PT/INR - ( 08 Jan 2023 06:25 )   PT: 29.1 sec;   INR: 2.49 ratio                               POCT Blood Glucose.: 68 mg/dL (09 Jan 2023 06:28)  POCT Blood Glucose.: 56 mg/dL (09 Jan 2023 06:06)  POCT Blood Glucose.: 62 mg/dL (09 Jan 2023 06:03)  POCT Blood Glucose.: 101 mg/dL (08 Jan 2023 21:41)  POCT Blood Glucose.: 135 mg/dL (08 Jan 2023 17:58)  POCT Blood Glucose.: 80 mg/dL (08 Jan 2023 13:01)          Culture - Urine (collected 06 Jan 2023 20:04)  Source: Clean Catch Clean Catch (Midstream)  Final Report (07 Jan 2023 20:19):    No growth    Culture - Blood (collected 03 Jan 2023 07:18)  Source: .Blood Blood  Final Report (08 Jan 2023 13:01):    No Growth Final    Culture - Blood (collected 03 Jan 2023 07:18)  Source: .Blood Blood  Final Report (08 Jan 2023 13:01):    No Growth Final        RADIOLOGY & ADDITIONAL TESTS:    Care Discussed with Consultants/Other Providers:

## 2023-01-09 NOTE — CONSULT NOTE ADULT - ASSESSMENT
A/P 37 Upper sor[place] speaking male hx of recently dx metastatic liver cancer to lung about 2 months ago s/p recent abd Pleurx placed and getting drained 3x/week for the past 2 weeks (last drained yesterday) pw AMS with hypoglycemia, elevated ammonia levels,   sepsis and +COVID.         AMS with hepatic encephalopathy in setting of metastatic liver cancer    r/o infection joanne SBP in setting of sig leukocytosis  Check CTAP.  IV Ceftriaxone  GI consult  lactulose enemas (failed dysphagia in ED)    Hypoglycemia  on dexamethasone.   switched to IV stress dose steroids.    Metastatic liver cancer  brother to bring in info regarding pt's care providers  Dx approx 2 months ago at [facility]   pt currently on Home Hospice services w/ [facility]        COVID positive  not candidate for Remdesivir given LFTs/liver cancer  supportive care.       Palliative :  asked for [facility] assist in pt currently on home hospice services for liver Ca w/ mets. case d/w med team ,chart reviewed, and pt seen bedside. he was alert, but very weakened , barely able to speak. he is frail and cachectic .  Pts brothdevon Calderon at bedside and very involved in pts care .  See Goc note above.  Pt had been home w/ hospice , when his mental status changed and he became  weaker , they called 911.   I called and spoke w/ pts  Effie at  Hospice  [phone], and  confirmed w/ [facility] that pt is actively enrolled in home hospice, but is a full code.  Discussed if pt and or family decide pt is dnr , then he may be candidate for inpatient hospice .  D/w Brother Kvng , I recommended to him that pt shd be dnr and to consider inpatient hospice services. Showed CPR video in [place] .  Brother has my contact info for any questions , he was going to speak w/ family.    Med team aware  . Will cont to follow clinical course . 
Assessment/Plan:  This patient has hypoglycemia in the setting of metastatic hepatocellular carcinoma.   The most likely etiology of his hypoglycemia is impaired hepatic gluconeogenesis.  I encouraged the patient to eat small frequent meals that are carbohydrate rich.  If the patient is unable to do this, then may use D50% - 25 mls IV q4 to 6hours prn if BG< 70 mg/dl.  I will monitor this patient.  Thank you very much for this consult.
37 year old male with PMH metastatic liver cancer presenting with change in mental status, hypoglycemia.  Found to be COVID positive 
37 [place] speaking male  recently dx metastatic liver cancer to lung about 2 months ago s/p recent abd Pleurx placed and getting drained 3x/week for the past 2 weeks (last drained yesterday) Home hospes nurse sent him to ER with AMS with hypoglycemia, elevated ammonia levels,   sepsis and +COVID.

## 2023-01-09 NOTE — PROGRESS NOTE ADULT - SUBJECTIVE AND OBJECTIVE BOX
Progress:  pt more lethargic    Present Symptoms:   Dyspnea: n  Nausea/Vomiting: n  Anxiety:  n  Depressed Mood: n  Fatigue: y  Loss of appetite: y  Pain:  no                 location:   Review of Systems:  Unable to obtain due to poor mentation    MEDICATIONS  (STANDING):  ascorbic acid 500 milliGRAM(s) Oral daily  cholecalciferol 1000 Unit(s) Oral daily  dexAMETHasone     Tablet 4 milliGRAM(s) Oral two times a day  dextrose 5%. 1000 milliLiter(s) (100 mL/Hr) IV Continuous <Continuous>  dextrose 50% Injectable 25 Gram(s) IV Push once  dextrose 50% Injectable 12.5 Gram(s) IV Push once  dextrose 50% Injectable 25 Gram(s) IV Push once  dextrose Oral Gel 15 Gram(s) Oral once  enoxaparin Injectable 40 milliGRAM(s) SubCutaneous every 24 hours  furosemide    Tablet 20 milliGRAM(s) Oral daily  glucagon  Injectable 1 milliGRAM(s) IntraMuscular once  lactulose Retention Enema 200 Gram(s) Rectal two times a day  melatonin 6 milliGRAM(s) Oral at bedtime  nystatin    Suspension [id number] Unit(s) Oral four times a day  pantoprazole    Tablet 40 milliGRAM(s) Oral before breakfast  spironolactone 25 milliGRAM(s) Oral daily  zinc sulfate 220 milliGRAM(s) Oral daily    MEDICATIONS  (PRN):  HYDROmorphone   Tablet 4 milliGRAM(s) Oral every 4 hours PRN Severe Pain (7 - 10)      PHYSICAL EXAM:  Vital Signs Last 24 Hrs  T(C): 36.6 (09 Jan 2023 05:46), Max: 36.6 (08 Jan 2023 16:34)  T(F): 97.8 (09 Jan 2023 05:46), Max: 97.9 (08 Jan 2023 16:34)  HR: 126 (09 Jan 2023 05:46) (115 - 126)  BP: 134/93 (09 Jan 2023 05:46) (134/93 - 144/97)  BP(mean): --  RR: 20 (09 Jan 2023 05:46) (18 - 20)  SpO2: 94% (09 Jan 2023 05:46) (94% - 95%)    Parameters below as of 09 Jan 2023 05:46  Patient On (Oxygen Delivery Method): room air      General: alert  oriented x _2___      HEENT: cachectic  Lungs: comfortable   CV: normal    GI: normal    : normal    Musculoskeletal: profound weakness    Neuro: lethargic  Oral intake ability:  oral feeding       LABS:                          13.1   16.81 )-----------( 126      ( 08 Jan 2023 06:25 )             39.6     01-08    132<L>  |  98  |  15  ----------------------------<  241<H>  3.8   |  25  |  0.54    Ca    8.8      08 Jan 2023 06:25    TPro  5.6<L>  /  Alb  2.1<L>  /  TBili  2.5<H>  /  DBili  x   /  AST  106<H>  /  ALT  126<H>  /  AlkPhos  245<H>  01-08        RADIOLOGY & ADDITIONAL STUDIES:    ADVANCE DIRECTIVES:  Advanced Care Planning discussion total time spent:

## 2023-01-09 NOTE — CONSULT NOTE ADULT - REASON FOR ADMISSION
hypoglycemia, AMS

## 2023-01-09 NOTE — CHART NOTE - NSCHARTNOTEFT_GEN_A_CORE
Nutrition Follow Up Note  Hospital Course   (Per Electronic Medical Record)    Source:  Patient [X]  Nursing Staff [X]   Medical Record [X]      Diet: Diet, Regular:   Supplement Feeding Modality:  Oral  Ensure Enlive Cans or Servings Per Day:  1       Frequency:  Three Times a day (01-08-23 @ 08:30) [Active]    Nutrition Follow Up: Patient with poor PO intakes at this time as per nursing flow sheets and meal tray observation. Hypoglycemic event noted and resolved per hospital protocol (1/9/23). POCT reviewed:  mg/dL in last 24 hours. Obtained food preferences from patients brother who was present at bedside to optimize pt's PO intake. Recommend continue with Ensure Plus High Protein 8oz PO TID (Provides 1,050kcal & 60grams of Protein).      Enteral/Parenteral Nutrition: Not Applicable    START [id number]    132<L>  |  98  |  15  ----------------------------<  241<H>  3.8   |  25  |  0.54    Ca    8.8      08 Jan 2023 06:25    TPro  5.6<L>  /  Alb  2.1<L>  /  TBili  2.5<H>  /  DBili  x   /  AST  106<H>  /  ALT  126<H>  /  AlkPhos  245<H>  01-08  POCT Blood Glucose.: 109 mg/dL (01-09-23 @ 11:58)  A1C with Estimated Average Glucose Result: 4.7 % (01-04-23 @ 11:17)  END CHEMFISH  START MEDSACTIVEMEDICATIONS  (STANDING):  ascorbic acid 500 milliGRAM(s) Oral daily  cholecalciferol 1000 Unit(s) Oral daily  dexAMETHasone     Tablet 4 milliGRAM(s) Oral two times a day  dextrose 5%. 1000 milliLiter(s) (100 mL/Hr) IV Continuous <Continuous>  dextrose 50% Injectable 25 Gram(s) IV Push once  dextrose 50% Injectable 12.5 Gram(s) IV Push once  dextrose 50% Injectable 25 Gram(s) IV Push once  dextrose Oral Gel 15 Gram(s) Oral once  enoxaparin Injectable 40 milliGRAM(s) SubCutaneous every 24 hours  furosemide    Tablet 20 milliGRAM(s) Oral daily  glucagon  Injectable 1 milliGRAM(s) IntraMuscular once  lactulose Retention Enema 200 Gram(s) Rectal two times a day  melatonin 6 milliGRAM(s) Oral at bedtime  nystatin    Suspension [id number] Unit(s) Oral four times a day  pantoprazole    Tablet 40 milliGRAM(s) Oral before breakfast  spironolactone 25 milliGRAM(s) Oral daily  zinc sulfate 220 milliGRAM(s) Oral daily    MEDICATIONS  (PRN):  HYDROmorphone   Tablet 4 milliGRAM(s) Oral every 4 hours PRN Severe Pain (7 - 10)  END MEDSACTIVE  START DIETORDEREND DIETORDER  START ADMITDXHypoglycemia    END ADMITDX  START IOFS  END IOFS  START SKINPUEND SKINPU    Pertinent Medications: MEDICATIONS  (STANDING):  ascorbic acid 500 milliGRAM(s) Oral daily  cholecalciferol 1000 Unit(s) Oral daily  dexAMETHasone     Tablet 4 milliGRAM(s) Oral two times a day  dextrose 5%. 1000 milliLiter(s) (100 mL/Hr) IV Continuous <Continuous>  dextrose 50% Injectable 25 Gram(s) IV Push once  dextrose 50% Injectable 12.5 Gram(s) IV Push once  dextrose 50% Injectable 25 Gram(s) IV Push once  dextrose Oral Gel 15 Gram(s) Oral once  enoxaparin Injectable 40 milliGRAM(s) SubCutaneous every 24 hours  furosemide    Tablet 20 milliGRAM(s) Oral daily  glucagon  Injectable 1 milliGRAM(s) IntraMuscular once  lactulose Retention Enema 200 Gram(s) Rectal two times a day  melatonin 6 milliGRAM(s) Oral at bedtime  nystatin    Suspension [id number] Unit(s) Oral four times a day  pantoprazole    Tablet 40 milliGRAM(s) Oral before breakfast  spironolactone 25 milliGRAM(s) Oral daily  zinc sulfate 220 milliGRAM(s) Oral daily    MEDICATIONS  (PRN):  HYDROmorphone   Tablet 4 milliGRAM(s) Oral every 4 hours PRN Severe Pain (7 - 10)      Pertinent Labs:  01-08 Na132 mmol/L<L> Glu 241 mg/dL<H> K+ 3.8 mmol/L Cr  0.54 mg/dL BUN 15 mg/dL 01-06 Phos 2.9 mg/dL 01-08 Alb 2.1 g/dL<L>          Weight Trends: 114 lb (1/3/23)      Skin: intact    Edema: +3 edema noted at b/l leg     Last Bowel Movement: 1/7/23    Estimated Needs:   [X] No Change Since Previous Assessment    Previous Nutrition Diagnosis:   Severe Malnutrition   Altered Nutrition Related Lab Values    Nutrition Diagnosis is [X] Ongoing - Ensure Plus High Protein 8oz PO TID (Provides 1,050kcal & 60grams of Protein) & food preferences      Interventions:   1. Recommend continue with Ensure Plus High Protein 8oz PO TID (Provides 1,050kcal & 60grams of Protein)   2. Honor patients daily food preferences as feasible with prescribed diet   3. Monitor daily PO intakes, weights, and BM regularity    Monitoring & Evaluation:   [X] Weights   [X] PO Intake   [X] Skin Integrity   [X] Follow Up (Per Protocol)  [X] Tolerance to Diet Prescription   [X] Other: Labs & POCT    Registered Dietitian/Nutritionist Remains Available.  Zachery Jane RD, CDN    Phone# [phone]

## 2023-01-09 NOTE — CONSULT NOTE ADULT - SUBJECTIVE AND OBJECTIVE BOX
HPI:  Audi Mac is a 37 year old male who was admitted to hospital on 01/03/2023 with altered mental status and hypoglycemia.  Patient had fingersticks in the 30s and 40s.  Patient has a diagnosis of metastatic hepatocellular carcinoma.   He is on dexamethasone.  Pertinent labs and Radiology: 01/04/2023 - HbA1c 4.7%, 01/03/2023 - CT abdomen - pancreas normal, hepatomegaly with innumerable masses in hepatic parenchyma measuring up to 7.5cm consistent with reported hepatic malignancy.    Review of systems - patient has pedal edema and distended abdomen, all other systems were reviewed and were negative.    PAST MEDICAL & SURGICAL HISTORY:  Liver cancer  H/O cirrhosis    MEDICATIONS  (STANDING):  ascorbic acid 500 milliGRAM(s) Oral daily  cholecalciferol 1000 Unit(s) Oral daily  dexAMETHasone     Tablet 4 milliGRAM(s) Oral two times a day  dextrose 5%. 1000 milliLiter(s) (100 mL/Hr) IV Continuous <Continuous>  dextrose 50% Injectable 25 Gram(s) IV Push once  dextrose 50% Injectable 12.5 Gram(s) IV Push once  dextrose 50% Injectable 25 Gram(s) IV Push once  dextrose Oral Gel 15 Gram(s) Oral once  enoxaparin Injectable 40 milliGRAM(s) SubCutaneous every 24 hours  furosemide    Tablet 20 milliGRAM(s) Oral daily  glucagon  Injectable 1 milliGRAM(s) IntraMuscular once  lactulose Retention Enema 200 Gram(s) Rectal two times a day  melatonin 6 milliGRAM(s) Oral at bedtime  nystatin    Suspension [id number] Unit(s) Oral four times a day  pantoprazole    Tablet 40 milliGRAM(s) Oral before breakfast  spironolactone 25 milliGRAM(s) Oral daily  zinc sulfate 220 milliGRAM(s) Oral daily    MEDICATIONS  (PRN):  HYDROmorphone   Tablet 4 milliGRAM(s) Oral every 4 hours PRN Severe Pain (7 - 10)    Allergies - No Known Allergies    Social History:  denied tob, ETOH or illicit drug use per brother (03 Jan 2023 07:45)      EXAMINATION:  T(C): 36.6 (01-09-23 @ 16:03), Max: 36.6 (01-09-23 @ 05:46)  HR: 126 (01-09-23 @ 05:46) (126 - 126)  BP: 129/83 (01-09-23 @ 16:03) (129/83 - 134/93)  RR: 18 (01-09-23 @ 16:03) (18 - 20)  SpO2: 93% (01-09-23 @ 16:03) (93% - 94%)    CONSTITUTIONAL: Well groomed, no apparent distress  EYES:, No conjunctival or scleral injection, non-icteric  ENMT: Normal external appearance of nose  RESP: No respiratory distress, no use of accessory muscles  CV: Has peripheral edema  GI: Abdomen is distended  MSK: No digital clubbing or cyanosis  SKIN: No rashes or ulcers noted  NEURO: Patient converses normally  PSYCH: Appropriate insight/judgment; A+O x 3, mood and affect appropriate, recent/remote memory intact

## 2023-01-09 NOTE — PROGRESS NOTE ADULT - ASSESSMENT
37M with recently diagnosed metastatic liver cancer to lung about 2 months ago, on home hospice, s/p recent abdominal Pleurx getting drained 3x/week presents with AMS and hypoglycemia. Admitted for metabolic encephalopathy, sepsis, and viral PNA from COVID.     #Metabolic and hepatic encephalopathy - improved   #Metastatic liver Cancer  #Hyponatremia - resolved   #Transaminitis  #Elevated total bilirubin   #Elevated lactate due to liver cancer and poor PO intake  - Encourage PO intake  - GI following, c/w lactulose enemas, lasix and aldactone  - Oncology following - consideration to resume hospice upon d/c  - Palliative care consulted - advanced care planning discussions in progress   - R upper abdominal pleux - patient reports that it is supposed to be drained q3 days, last drained prior to admission. RN to drain  - Will request chaplaincy consult. Emotional support provided    #Hypoglycemia  - Likely due to poor PO intake and poor liver function  - Continue hypoglycemia protocol prn  - c/w Dexamethasone PO  - Was off D5W - will resume as hypoglycemic. Will request endocrine consult - d/w Dr Kraft who will see patient tomorrow     #Viral PNA 2/2 COVID  #Acute hypoxic respiratory failure   - Not a candidate for Remdesivir given transaminitis  - Two episodes exertional hypoxia, repeat CXR appears unchanged, continue supplemental O2    #Severe protein calorie malnutrition  #Cachexia  - Continue multivitamin  - Supportive care    #Thrush  - c/w Nystatin    #Tachycardia  - Multifactorial including deconditioning and poor PO intake  - Encourage PO    #Prophylactic Measure  - DVT ppx: Lovenox    GOC: DNR/I, MOLST in chart    Dispo: Endocrine consult pending. Anticipate eventual home hospice     1/8: Updated wife Kendra at bedside       37M with recently diagnosed metastatic liver cancer to lung about 2 months ago, on home hospice, s/p recent abdominal Pleurx getting drained 3x/week presents with AMS and hypoglycemia. Admitted for metabolic encephalopathy, sepsis, and viral PNA from COVID.     #Metabolic and hepatic encephalopathy - improved   #Metastatic liver Cancer  #Hyponatremia - resolved   #Transaminitis  #Elevated total bilirubin   #Elevated lactate due to liver cancer and poor PO intake  - Encourage PO intake  - GI following, c/w lactulose enemas- bid as tolerated ,  - Oncology following - consideration for inpt hospic - discussion with family and palliative   - Palliative care consulted -following   - R upper abdominal pleux - patient reports that it is supposed to be drained q3 days, last drained Sunday 550cc   - chaplaincy consult placed and seen . Emotional support provided    #Hypoglycemia  - Likely due to poor PO intake and poor liver function  - Continue hypoglycemia protocol prn  - c/w Dexamethasone PO  - continue  D5W - will increase to 125/hr  await  endocrine consult - d/w Dr Kraft see pt today     #Viral PNA 2/2 COVID- resolved   #Acute hypoxic respiratory failure   - Not a candidate for Remdesivir given transaminitis  - Two episodes exertional hypoxia, repeat CXR appears unchanged, continue supplemental O2    #Severe protein calorie malnutrition  #Cachexia  - Continue multivitamin  - Supportive care    #Thrush  - c/w Nystatin    #Tachycardia  - Multifactorial including deconditioning and poor PO intake  - Encourage PO  - IVF at 125cc /hr     #Prophylactic Measure  - DVT ppx: Lovenox    GOC: DNR/I, MOLST in chart    Dispo: Endocrine consult pending. Anticipatemostlikelfrancis in pt hospice     1/9: Updated brother at bedside        37M with recently diagnosed metastatic liver cancer to lung about 2 months ago, on home hospice, s/p recent abdominal Pleurx getting drained 3x/week presents with AMS and hypoglycemia. Admitted for metabolic encephalopathy, sepsis, and viral PNA from COVID.     #Metabolic and hepatic encephalopathy - improved   #Metastatic liver Cancer  #Hyponatremia - resolved   #Transaminitis  #Elevated total bilirubin   #Elevated lactate due to liver cancer and poor PO intake  - Encourage PO intake  - GI following, c/w lactulose enemas- bid as tolerated ,  - Oncology following - consideration for inpt hospic - discussion with family and palliative   - Palliative care consulted -following   - R upper abdominal pleux - patient reports that it is supposed to be drained q3 days, last drained Sunday 550cc   - chaplaincy consult placed and seen . Emotional support provided    #Hypoglycemia  - Likely due to poor PO intake and poor liver function  - Continue hypoglycemia protocol prn  - c/w Dexamethasone PO  - continue  D5W - will increase to 125/hr  await  endocrine consult - d/w Dr Kraft see pt today     #Viral PNA 2/2 COVID- resolved   #Acute hypoxic respiratory failure   - Not a candidate for Remdesivir given transaminitis  - Two episodes exertional hypoxia, repeat CXR appears unchanged, continue supplemental O2    #Severe protein calorie malnutrition  #Cachexia  - Continue multivitamin  - Supportive care    #Thrush  - c/w Nystatin    #Tachycardia  - Multifactorial including deconditioning and poor PO intake  - Encourage PO  - IVF at 100cc /hr     #Prophylactic Measure  - DVT ppx: Lovenox    GOC: DNR/I, MOLST in chart    Dispo: Endocrine consult pending. Anticipate nikko in pt hospice     1/9: Updated brother at bedside

## 2023-01-09 NOTE — PROGRESS NOTE ADULT - SUBJECTIVE AND OBJECTIVE BOX
NARINDER LAKHANI, 37y Male  MRN: [id number]  ATTENDING: Rosemarie Barr    HPI:  37M, Bangladeshi speaking only, reportedly diagnosed with metastatic hepatoma in October 2022 at [place], admitted to home hospice, brought in by family to the ED at [place] with altered mentation and hypoglycemia.  Patient has been clinically deteriorating, has a Pleurx catheter in place and has therapeutic pleural fluid removal 3 times a week.  On arrival to the emergency room with abdominal pain patient had a CT abdomen did show hepatosplenomegaly, innumerable masses visualized within the lung parenchyma measuring up to 2.5 cm in size consistent with metastatic disease, innumerable masses in the hepatic parenchyma measuring up to 7.5 cm consistent with hepatic malignancy, portal hypertension, a small volume of ascites, L4 metastatic deposit.  CT head showed no acute pathology.  Evaluated by palliative care team in [place] deemed appropriate for hospice.  Patient has never been treated with systemic therapy.  Hematologic picture consistent with coagulopathy, secondary to hypersplenism/thrombocytopenia, elevated LFTs.  In the emergency room he was found encephalopathic, hypoglycemic, acute COVID +.  Medical oncology consulted to discuss prognosis.    MEDICATIONS:  ascorbic acid 500 milliGRAM(s) Oral daily  cholecalciferol 1000 Unit(s) Oral daily  dexAMETHasone     Tablet 2 milliGRAM(s) Oral two times a day  dextrose 50% Injectable 25 Gram(s) IV Push once  dextrose 50% Injectable 12.5 Gram(s) IV Push once  dextrose 50% Injectable 25 Gram(s) IV Push once  dextrose Oral Gel 15 Gram(s) Oral once  enoxaparin Injectable 40 milliGRAM(s) SubCutaneous every 24 hours  furosemide    Tablet 20 milliGRAM(s) Oral daily  glucagon  Injectable 1 milliGRAM(s) IntraMuscular once  HYDROmorphone   Tablet 4 milliGRAM(s) Oral every 4 hours PRN  lactulose Retention Enema 200 Gram(s) Rectal three times a day  melatonin 6 milliGRAM(s) Oral at bedtime  pantoprazole    Tablet 40 milliGRAM(s) Oral before breakfast  piperacillin/tazobactam IVPB.. 3.375 Gram(s) IV Intermittent every 8 hours  sodium chloride 0.9% Bolus 500 milliLiter(s) IV Bolus once  spironolactone 25 milliGRAM(s) Oral daily  zinc sulfate 220 milliGRAM(s) Oral daily    All other medications reviewed.    SUBJECTIVE:  Patient progressively more lethargic    VITALS:  T(C): 36.6 (01-04-23 @ 16:42), Max: 36.6 (01-04-23 @ 16:42)  T(F): 97.9 (01-04-23 @ 16:42), Max: 97.9 (01-04-23 @ 16:42)  HR: 105 (01-05-23 @ 05:10) (105 - 123)  BP: 126/89 (01-05-23 @ 05:10) (118/81 - 129/89)      PHYSICAL EXAM:  Constitutional: alert,cachectic  HEENT: normocephalic, anicteric sclerae, no mucositis or thrush  Respiratory: bilateral clear to auscultation anteriorly  Cardiovascular : S1, S2 regular, rhythmic, no murmurs, gallops or rubs  Abdomen: soft, distended, + normoactive BS, no palpable HS- megaly  Extremities: no tenderness;  -c/c/e, pulses equal bilaterally    LABS:             13.1   16.81 )-----------( 126      ( 08 Jan 2023 06:25 )             39.6     01-08    132<L>  |  98  |  15  ----------------------------<  241<H>  3.8   |  25  |  0.54    Ca    8.8      08 Jan 2023 06:25    TPro  5.6<L>  /  Alb  2.1<L>  /  TBili  2.5<H>  /  DBili  x   /  AST  106<H>  /  ALT  126<H>  /  AlkPhos  245<H>  01-08    (01-04) WBC: 12.84 K/uL,Hemoglobin: 13.1 g/dL, Hematocrit: 40.7 %,  Platelet: 139 K/uL  (01-04) Na: 133 mmol/L ; K: 4.0 mmol/L ; BUN: 13 mg/dL ; Cr: 0.40 mg/dL.  PT/INR - ( 03 Jan 2023 05:45 )   PT: 31.7 sec;   INR: 2.71 ratio  PTT - ( 03 Jan 2023 05:45 )  PTT:40.4 sec  Ca    8.2<L>      03 Jan 2023 05:45  TPro  5.6<L>  /  Alb  2.3<L>  /  TBili  2.0<H>  /  DBili  x   /  AST  78<H>  /  ALT  78<H>  /  AlkPhos  199<H>  01-03  LIVER FUNCTIONS - ( 03 Jan 2023 05:45 )  Alb: 2.3 g/dL / Pro: 5.6 g/dL / ALK PHOS: 199 U/L / ALT: 78 U/L / AST: 78 U/L / GGT: x         Lipase, Serum: 23 U/L (01-03-23 @ 05:45)    RADIOLOGY:  ACC: [id number] EXAM:  CT ABDOMEN AND PELVIS                          PROCEDURE DATE:  01/03/2023      INTERPRETATION:  CLINICAL INFORMATION: Sepsis, abdominal pain. Reported   hepatic malignancy/cirrhosis.    COMPARISON: None.    CONTRAST/COMPLICATIONS:  IV Contrast: NONE  Oral Contrast: NONE  Complications: None reported at time of study completion    PROCEDURE:  CT of the Abdomen and Pelvis was performed.  Sagittal and coronal reformats were performed.    FINDINGS:  LOWER CHEST: Innumerable masses are visualized within the lung parenchyma   measuring up to 2.5 cm in size consistent with lung parenchymal   metastatic neoplastic disease.    LIVER: Hepatomegaly. Heterogeneous attenuation of the hepatic parenchyma   identified with innumerable masses within the hepatic parenchyma   measuring up to 7.5 cm consistent with reported hepatic malignancy.  BILE DUCTS: No intrahepatic or extrahepatic biliary ductal dilatation   identified.  GALLBLADDER: No gross gallstones or gallbladder wall thickening   identified.  SPLEEN: Splenomegaly.  PANCREAS: Within normal limits.  ADRENALS: Soft tissue nodularity measuring 1.9 cm is identified within   the right suprarenal region and a right adrenal nodule cannot be   excluded. The left adrenal appears unremarkable.  KIDNEYS/URETERS: No hydronephrosis. No renal calculi. No space-occupying   lesions of the renal parenchyma identified.    BLADDER: Minimally distended, precluding assessment.  REPRODUCTIVE ORGANS: The prostate appears unremarkable.    BOWEL: No evidence for mechanical bowel obstruction. Pancolonic wall   thickening is suggested raising suspicion for colitis. No evidence for   acute appendicitis.  PERITONEUM: Indwelling peritoneal change catheter. A small volume of   ascites isidentified with fluid noted within the right inguinal canal.   Diffuse stranding of the mesentery noted which may be related to the   presence of ascites; a 2.2 cm soft tissue nodule is identified posterior   to the cecum and a mesenteric neoplastic deposit cannot be excluded. No   free intraperitoneal air.  VESSELS: Recanalized paraumbilical vein and perisplenic varices likely   related to portal hypertension.  RETROPERITONEUM/LYMPH NODES: No lymphadenopathy.  ABDOMINAL WALL: Within normal limits.  BONES: A region of circumscribed lucency is identified within the   anterior/inferior aspect of the L4 vertebral body, a metastatic   neoplastic deposit cannot be excluded.    IMPRESSION:  1. Pancolonic wall thickening is suggested raising suspicion for colitis.  2. Innumerable masses are visualized within the lung parenchyma measuring   up to 2.5 cm in size consistent with lung parenchymal metastatic   neoplastic disease.  3. Hepatomegaly. Heterogeneous attenuation of the hepatic parenchyma   identified with innumerable masses within the hepatic parenchyma   measuring up to 7.5 cm consistent with reported hepatic malignancy.   Splenomegaly. Portal hypertension.  4. Indwelling peritoneal change catheter. A small volume of ascites is   identified with fluid noted within the right inguinal canal. Diffuse   stranding of the mesentery noted which may be related to the presence of   ascites; a 2.2 cm soft tissue nodule is identified posterior to the cecum   and a mesenteric neoplastic deposit cannot be excluded.  5. A region of circumscribed lucency is identified within the   anterior/inferior aspect of the L4 vertebral body, a metastatic   neoplastic deposit cannot be excluded.

## 2023-01-09 NOTE — PROGRESS NOTE ADULT - PROBLEM SELECTOR PLAN 1
Patient with liver cancer, acute COVID infection, rapidly progressing hepatocellular carcinoma.  Evaluated by palliative care–awaiting family decision on hospice.  In interim treated supportively, with recurrent upper abdominal Pleurx drainage of ascites, lactulose enemas, pain medication.  Hematologic picture stable, but no plans for systemic therapy.  Social work in progress.

## 2023-01-09 NOTE — CONSULT NOTE ADULT - CONSULT REQUESTED DATE/TIME
04-Jan-2023 19:48
09-Jan-2023 12:11
03-Jan-2023 16:21
04-Jan-2023
03-Jan-2023 15:55
03-Jan-2023 15:13

## 2023-01-09 NOTE — PROVIDER CONTACT NOTE (HYPOGLYCEMIA EVENT) - NS PROVIDER CONTACT NOTE-TREATMENT-HYPO
4 oz Apple Juice and 2 crackers on 1/8/2023 at 6:07am  4 oz Apple juice and 2 crackers on 1/8/2023 at 6:29 am/4 oz Fruit Juice (Specify quantity, date/time) 4 oz Apple Juice and 2 crackers on 1/9/2023 at 6:07am  4 oz Apple juice and 2 crackers on 1/9/2023 at 6:29 am/4 oz Fruit Juice (Specify quantity, date/time)

## 2023-01-09 NOTE — PROVIDER CONTACT NOTE (HYPOGLYCEMIA EVENT) - NS PROVIDER CONTACT BACKGROUND-HYPO
Age: 37y    Gender: Male    POCT Blood Glucose:  92 mg/dL (01-09-23 @ 06:56)  68 mg/dL (01-09-23 @ 06:28)  56 mg/dL (01-09-23 @ 06:06)  62 mg/dL (01-09-23 @ 06:03)  101 mg/dL (01-08-23 @ 21:41)  135 mg/dL (01-08-23 @ 17:58)  80 mg/dL (01-08-23 @ 13:01)      eMAR:  dexAMETHasone     Tablet   6 milliGRAM(s) Oral (01-09-23 @ 05:54)

## 2023-01-09 NOTE — CONSULT NOTE ADULT - CONSULT REASON
metastatic liver cancer with sepsis r/o SBP
sepsis
goc assist / home hospice pt
Lactic Acidosis
Assistance in the management of hypoglycemia
liver cancer

## 2023-01-09 NOTE — PROGRESS NOTE ADULT - ASSESSMENT
37M with recently diagnosed metastatic liver cancer to lung about 2 months ago, on home hospice, s/p recent abdominal Pleurx getting drained 3x/week presents with AMS and hypoglycemia. Admitted for metabolic encephalopathy, sepsis, and viral PNA from COVID.   Hospice palcement discussed with family today.  #Metabolic and hepatic encephalopathy - improved   #Metastatic liver Cancer  #Hyponatremia - resolved   #Transaminitis  #Elevated total bilirubin   #Elevated lactate due to liver cancer and poor PO intake  - Encourage PO intake  - GI following, c/w lactulose enemas- bid as tolerated ,  - Oncology following - consideration for inpt hospic - discussion with family and palliative      - R upper abdominal pleux - patient reports that it is supposed to be drained q3 days, last drained Sunday 550cc   - chaplaincy consult placed and seen . Emotional support provided    #Hypoglycemia  - Likely due to poor PO intake and poor liver function  - Continue hypoglycemia protocol prn  - c/w Dexamethasone PO  - continue  D5W - will increase to 125/hr  await  endocrine consult - d/w Dr Kraft see pt today     #Viral PNA 2/2 COVID- resolved   #Acute hypoxic respiratory failure   - Not a candidate for Remdesivir given transaminitis  - Two episodes exertional hypoxia, repeat CXR appears unchanged, continue supplemental O2    #Severe protein calorie malnutrition  #Cachexia  - Continue multivitamin  - Supportive care    #Thrush  - c/w Nystatin    #Tachycardia  - Multifactorial including deconditioning and poor PO intake  - Encourage PO  - IVF at 100cc /hr     #Prophylactic Measure  - DVT ppx: Lovenox    GOC: DNR/I, MOLST in chart    Dispo: Endocrine consult pending. Anticipate nikko in pt hospice     1/9: Updated brother at bedside

## 2023-01-10 NOTE — PROGRESS NOTE ADULT - ASSESSMENT
37M with recently diagnosed metastatic liver cancer to lung about 2 months ago, on home hospice, s/p recent abdominal Pleurx getting drained 3x/week presents with AMS and hypoglycemia. Admitted for metabolic encephalopathy, sepsis, and viral PNA from COVID.     #Metabolic and hepatic encephalopathy - improved   #Metastatic liver Cancer  #Hyponatremia - resolved   #Transaminitis  #Elevated total bilirubin   #Elevated lactate due to liver cancer and poor PO intake  - Encourage PO intake  - GI following, c/w lactulose enemas- bid as tolerated   - Oncology following - consideration for inpt hospic - discussion with family and palliative ongoing  - Palliative care consulted  -following   - R upper abdominal pleux - patient reports that it is supposed to be drained q3 days, last drained Sunday 550cc   - Chaplaincy consult placed and seen. Emotional support provided    #Hypoglycemia  - Likely due to poor PO intake and poor liver function  - Continue hypoglycemia protocol prn  - c/w Dexamethasone PO  - continue  D5W - will increase to 125/hr    If the patient is unable to do this, then may use D50% - 25 mls IV q4 to 6hours prn if BG< 70 mg/dl.      #Viral PNA 2/2 COVID- resolved   #Acute hypoxic respiratory failure   - Not a candidate for Remdesivir given transaminitis  - Two episodes exertional hypoxia, repeat CXR appears unchanged, continue supplemental O2    #Severe protein calorie malnutrition  #Cachexia  - Continue multivitamin  - Supportive care    #Thrush  - c/w Nystatin    #Tachycardia  - Multifactorial including deconditioning and poor PO intake  - Encourage PO  - IVF at 100cc /hr     #Prophylactic Measure  - DVT ppx: Lovenox    GOC: DNR/I, SHRUTI in chart    Dispo: Possible inpatient hospice     1/10: Updated brother Ariana at bedside        37M with recently diagnosed metastatic liver cancer to lung about 2 months ago, on home hospice, s/p recent abdominal Pleurx getting drained 3x/week presents with AMS and hypoglycemia. Admitted for metabolic encephalopathy, sepsis, and viral PNA from COVID.     #Metabolic and hepatic encephalopathy - improved   #Metastatic liver Cancer  #Hyponatremia - resolved   #Transaminitis  #Elevated total bilirubin   #Elevated lactate due to liver cancer and poor PO intake  - Encourage PO intake  - GI following, c/w lactulose enemas- bid as tolerated   - Oncology following - consideration for inpt hospic - discussion with family and palliative ongoing  - Palliative care consulted  -following   - R upper abdominal pleux - patient reports that it is supposed to be drained q3 days, last drained Sunday 550cc   - Chaplaincy consult placed and seen. Emotional support provided    #Hypoglycemia  #Hyponatremia  - Likely due to poor PO intake and poor liver function  - Continue hypoglycemia protocol prn  - c/w Dexamethasone PO  - Switch to D5W + 1/2 NS - will increase to 125/hr    - Endocrinology consulted, appreciate recs     #Viral PNA 2/2 COVID- resolved   #Acute hypoxic respiratory failure   - Not a candidate for Remdesivir given transaminitis  - Two episodes exertional hypoxia, repeat CXR appears unchanged, continue supplemental O2    #Severe protein calorie malnutrition  #Cachexia  - Continue multivitamin  - Supportive care    #Thrush  - c/w Nystatin    #Tachycardia  - Multifactorial including deconditioning and poor PO intake  - Encourage PO  - Continue IVF    #Prophylactic Measure  - DVT ppx: Lovenox    GOC: DNR/I, MOL in chart    Dispo: Possible inpatient hospice     1/10: Updated brother Yumikocee at bedside

## 2023-01-10 NOTE — PROGRESS NOTE ADULT - ASSESSMENT
Palliative:  end stage liver cancer now being kept alive with iv glucose.  I would consider asking his hospice agency if he could go to there hospice facility on iv glucose.

## 2023-01-10 NOTE — PROGRESS NOTE ADULT - PROBLEM SELECTOR PLAN 1
Progressive encephalopathy in the setting of rapid clinical deterioration due to liver cancer.  Continue supportive care, but unlikely to change the course of disease.  Opted for comfort care; family to decide regarding palliative care services.  No need for transfusion at this time.  DVT prophylaxis with Lovenox.  Consider inpatient hospice.

## 2023-01-10 NOTE — PROGRESS NOTE ADULT - SUBJECTIVE AND OBJECTIVE BOX
NARINDER LAKHANI, 37y Male  MRN: [id number]  ATTENDING: Rosemarie Barr    HPI:  37M, French speaking only, reportedly diagnosed with metastatic hepatoma in October 2022 at [facility], admitted to home hospice, brought in by family to the ED at [place] with altered mentation and hypoglycemia.  Patient has been clinically deteriorating, has a Pleurx catheter in place and has therapeutic pleural fluid removal 3 times a week.  On arrival to the emergency room with abdominal pain patient had a CT abdomen did show hepatosplenomegaly, innumerable masses visualized within the lung parenchyma measuring up to 2.5 cm in size consistent with metastatic disease, innumerable masses in the hepatic parenchyma measuring up to 7.5 cm consistent with hepatic malignancy, portal hypertension, a small volume of ascites, L4 metastatic deposit.  CT head showed no acute pathology.  Evaluated by palliative care team in [place] deemed appropriate for hospice.  Patient has never been treated with systemic therapy.  Hematologic picture consistent with coagulopathy, secondary to hypersplenism/thrombocytopenia, elevated LFTs.  In the emergency room he was found encephalopathic, hypoglycemic, acute COVID +.  Medical oncology consulted to discuss prognosis.    MEDICATIONS:  ascorbic acid 500 milliGRAM(s) Oral daily  cholecalciferol 1000 Unit(s) Oral daily  dexAMETHasone     Tablet 2 milliGRAM(s) Oral two times a day  dextrose 50% Injectable 25 Gram(s) IV Push once  dextrose 50% Injectable 12.5 Gram(s) IV Push once  dextrose 50% Injectable 25 Gram(s) IV Push once  dextrose Oral Gel 15 Gram(s) Oral once  enoxaparin Injectable 40 milliGRAM(s) SubCutaneous every 24 hours  furosemide    Tablet 20 milliGRAM(s) Oral daily  glucagon  Injectable 1 milliGRAM(s) IntraMuscular once  HYDROmorphone   Tablet 4 milliGRAM(s) Oral every 4 hours PRN  lactulose Retention Enema 200 Gram(s) Rectal three times a day  melatonin 6 milliGRAM(s) Oral at bedtime  pantoprazole    Tablet 40 milliGRAM(s) Oral before breakfast  piperacillin/tazobactam IVPB.. 3.375 Gram(s) IV Intermittent every 8 hours  sodium chloride 0.9% Bolus 500 milliLiter(s) IV Bolus once  spironolactone 25 milliGRAM(s) Oral daily  zinc sulfate 220 milliGRAM(s) Oral daily    All other medications reviewed.    SUBJECTIVE:  Nonverbal, encephalopathic.  No changes in clinical status overall.    VITALS:  T(C): 36.6 (01-04-23 @ 16:42), Max: 36.6 (01-04-23 @ 16:42)  T(F): 97.9 (01-04-23 @ 16:42), Max: 97.9 (01-04-23 @ 16:42)  HR: 105 (01-05-23 @ 05:10) (105 - 123)  BP: 126/89 (01-05-23 @ 05:10) (118/81 - 129/89)    PHYSICAL EXAM:  Constitutional: alert,cachectic  HEENT: normocephalic, anicteric sclerae, no mucositis or thrush  Respiratory: bilateral clear to auscultation anteriorly  Cardiovascular : S1, S2 regular, rhythmic, no murmurs, gallops or rubs  Abdomen: soft, distended, + normoactive BS, no palpable HS- megaly  Extremities: no tenderness;  -c/c/e, pulses equal bilaterally    LABS:             13.1   16.81 )-----------( 126      ( 08 Jan 2023 06:25 )             39.6     01-08    132<L>  |  98  |  15  ----------------------------<  241<H>  3.8   |  25  |  0.54    Ca    8.8      08 Jan 2023 06:25    TPro  5.6<L>  /  Alb  2.1<L>  /  TBili  2.5<H>  /  DBili  x   /  AST  106<H>  /  ALT  126<H>  /  AlkPhos  245<H>  01-08    (01-04) WBC: 12.84 K/uL,Hemoglobin: 13.1 g/dL, Hematocrit: 40.7 %,  Platelet: 139 K/uL  (01-04) Na: 133 mmol/L ; K: 4.0 mmol/L ; BUN: 13 mg/dL ; Cr: 0.40 mg/dL.  PT/INR - ( 03 Jan 2023 05:45 )   PT: 31.7 sec;   INR: 2.71 ratio  PTT - ( 03 Jan 2023 05:45 )  PTT:40.4 sec  Ca    8.2<L>      03 Jan 2023 05:45  TPro  5.6<L>  /  Alb  2.3<L>  /  TBili  2.0<H>  /  DBili  x   /  AST  78<H>  /  ALT  78<H>  /  AlkPhos  199<H>  01-03  LIVER FUNCTIONS - ( 03 Jan 2023 05:45 )  Alb: 2.3 g/dL / Pro: 5.6 g/dL / ALK PHOS: 199 U/L / ALT: 78 U/L / AST: 78 U/L / GGT: x         Lipase, Serum: 23 U/L (01-03-23 @ 05:45)    RADIOLOGY:  ACC: [id number] EXAM:  CT ABDOMEN AND PELVIS                          PROCEDURE DATE:  01/03/2023      INTERPRETATION:  CLINICAL INFORMATION: Sepsis, abdominal pain. Reported   hepatic malignancy/cirrhosis.    COMPARISON: None.    CONTRAST/COMPLICATIONS:  IV Contrast: NONE  Oral Contrast: NONE  Complications: None reported at time of study completion    PROCEDURE:  CT of the Abdomen and Pelvis was performed.  Sagittal and coronal reformats were performed.    FINDINGS:  LOWER CHEST: Innumerable masses are visualized within the lung parenchyma   measuring up to 2.5 cm in size consistent with lung parenchymal   metastatic neoplastic disease.    LIVER: Hepatomegaly. Heterogeneous attenuation of the hepatic parenchyma   identified with innumerable masses within the hepatic parenchyma   measuring up to 7.5 cm consistent with reported hepatic malignancy.  BILE DUCTS: No intrahepatic or extrahepatic biliary ductal dilatation   identified.  GALLBLADDER: No gross gallstones or gallbladder wall thickening   identified.  SPLEEN: Splenomegaly.  PANCREAS: Within normal limits.  ADRENALS: Soft tissue nodularity measuring 1.9 cm is identified within   the right suprarenal region and a right adrenal nodule cannot be   excluded. The left adrenal appears unremarkable.  KIDNEYS/URETERS: No hydronephrosis. No renal calculi. No space-occupying   lesions of the renal parenchyma identified.    BLADDER: Minimally distended, precluding assessment.  REPRODUCTIVE ORGANS: The prostate appears unremarkable.    BOWEL: No evidence for mechanical bowel obstruction. Pancolonic wall   thickening is suggested raising suspicion for colitis. No evidence for   acute appendicitis.  PERITONEUM: Indwelling peritoneal change catheter. A small volume of   ascites isidentified with fluid noted within the right inguinal canal.   Diffuse stranding of the mesentery noted which may be related to the   presence of ascites; a 2.2 cm soft tissue nodule is identified posterior   to the cecum and a mesenteric neoplastic deposit cannot be excluded. No   free intraperitoneal air.  VESSELS: Recanalized paraumbilical vein and perisplenic varices likely   related to portal hypertension.  RETROPERITONEUM/LYMPH NODES: No lymphadenopathy.  ABDOMINAL WALL: Within normal limits.  BONES: A region of circumscribed lucency is identified within the   anterior/inferior aspect of the L4 vertebral body, a metastatic   neoplastic deposit cannot be excluded.    IMPRESSION:  1. Pancolonic wall thickening is suggested raising suspicion for colitis.  2. Innumerable masses are visualized within the lung parenchyma measuring   up to 2.5 cm in size consistent with lung parenchymal metastatic   neoplastic disease.  3. Hepatomegaly. Heterogeneous attenuation of the hepatic parenchyma   identified with innumerable masses within the hepatic parenchyma   measuring up to 7.5 cm consistent with reported hepatic malignancy.   Splenomegaly. Portal hypertension.  4. Indwelling peritoneal change catheter. A small volume of ascites is   identified with fluid noted within the right inguinal canal. Diffuse   stranding of the mesentery noted which may be related to the presence of   ascites; a 2.2 cm soft tissue nodule is identified posterior to the cecum   and a mesenteric neoplastic deposit cannot be excluded.  5. A region of circumscribed lucency is identified within the   anterior/inferior aspect of the L4 vertebral body, a metastatic   neoplastic deposit cannot be excluded.

## 2023-01-10 NOTE — PROGRESS NOTE ADULT - SUBJECTIVE AND OBJECTIVE BOX
Progress: no improvement.  Pt is getting progressively weaker     Present Symptoms: lethargic and generally non verbal  Dyspnea: n  Nausea/Vomiting: n  Anxiety:  n  Depressed Mood: n  Fatigue: n  Loss of appetite: y  Pain: no apparent                  location:   Review of Systems:  Unable to obtain due to poor mentation    MEDICATIONS  (STANDING):  ascorbic acid 500 milliGRAM(s) Oral daily  cholecalciferol 1000 Unit(s) Oral daily  dexAMETHasone     Tablet 4 milliGRAM(s) Oral two times a day  dextrose 5% + sodium chloride 0.45%. 1000 milliLiter(s) (125 mL/Hr) IV Continuous <Continuous>  dextrose 50% Injectable 25 Gram(s) IV Push once  dextrose 50% Injectable 12.5 Gram(s) IV Push once  dextrose 50% Injectable 25 Gram(s) IV Push once  dextrose Oral Gel 15 Gram(s) Oral once  enoxaparin Injectable 40 milliGRAM(s) SubCutaneous every 24 hours  furosemide    Tablet 20 milliGRAM(s) Oral daily  glucagon  Injectable 1 milliGRAM(s) IntraMuscular once  lactulose Retention Enema 200 Gram(s) Rectal two times a day  melatonin 6 milliGRAM(s) Oral at bedtime  nystatin    Suspension 225085 Unit(s) Oral four times a day  pantoprazole    Tablet 40 milliGRAM(s) Oral before breakfast  spironolactone 25 milliGRAM(s) Oral daily  zinc sulfate 220 milliGRAM(s) Oral daily    MEDICATIONS  (PRN):  HYDROmorphone   Tablet 4 milliGRAM(s) Oral every 4 hours PRN Severe Pain (7 - 10)      PHYSICAL EXAM:  Vital Signs Last 24 Hrs  T(C): 36.7 (10 Nick 2023 05:36), Max: 36.7 (10 Nick 2023 05:36)  T(F): 98.1 (10 Nick 2023 05:36), Max: 98.1 (10 Nick 2023 05:36)  HR: 116 (10 Nick 2023 05:36) (116 - 116)  BP: 133/67 (10 Nick 2023 05:36) (133/67 - 133/67)  BP(mean): --  RR: 18 (10 Nick 2023 05:36) (18 - 18)  SpO2: 96% (10 Nick 2023 05:36) (96% - 96%)    Parameters below as of 10 Nick 2023 05:36  Patient On (Oxygen Delivery Method): room air      General: opens eyes to verbal stimuli  HEENT: normal    Lungs: comfortable   CV: normal    GI: distended abdomen  : normal    Musculoskeletal: weak  Skin: normal    Neuro: confused  Oral intake ability:  minmal  Diet: NPO,     LABS:      01-10    129<L>  |  95<L>  |  8   ----------------------------<  117<H>  4.1   |  23  |  0.40<L>    Ca    8.5      10 Nick 2023 06:20          RADIOLOGY & ADDITIONAL STUDIES:    ADVANCE DIRECTIVES:  Advanced Care Planning discussion total time spent:

## 2023-01-10 NOTE — PROGRESS NOTE ADULT - SUBJECTIVE AND OBJECTIVE BOX
Patient is a 37y old  Male who presents with a chief complaint of hypoglycemia, AMS (09 Jan 2023 21:11)    Patient seen and examined at bedside. No overnight events reported. Attempted to use  ID [id number] but connection dropped, patient's brother at bedside translated. Patient c/o heart racing/palpitations. Denies pain. Otherwise no complaints     ALLERGIES:  No Known Allergies    MEDICATIONS  (STANDING):  ascorbic acid 500 milliGRAM(s) Oral daily  cholecalciferol 1000 Unit(s) Oral daily  dexAMETHasone     Tablet 4 milliGRAM(s) Oral two times a day  enoxaparin Injectable 40 milliGRAM(s) SubCutaneous every 24 hours  furosemide    Tablet 20 milliGRAM(s) Oral daily  lactulose Retention Enema 200 Gram(s) Rectal two times a day  melatonin 6 milliGRAM(s) Oral at bedtime  nystatin    Suspension 261019 Unit(s) Oral four times a day  pantoprazole    Tablet 40 milliGRAM(s) Oral before breakfast  spironolactone 25 milliGRAM(s) Oral daily  zinc sulfate 220 milliGRAM(s) Oral daily    MEDICATIONS  (PRN):  HYDROmorphone   Tablet 4 milliGRAM(s) Oral every 4 hours PRN Severe Pain (7 - 10)    Vital Signs Last 24 Hrs  T(F): 98.1 (10 Nick 2023 05:36), Max: 98.1 (10 Nick 2023 05:36)  HR: 116 (10 Nick 2023 05:36) (116 - 116)  BP: 133/67 (10 Nick 2023 05:36) (129/83 - 133/67)  RR: 18 (10 Nick 2023 05:36) (18 - 18)  SpO2: 96% (10 Nick 2023 05:36) (93% - 96%)    I&O's Summary  09 Jan 2023 07:01  -  10 Nick 2023 07:00  --------------------------------------------------------  IN: 1200 mL / OUT: 2 mL / NET: 1198 mL    PHYSICAL EXAM:  General: NAD, A/O x 3, chronically ill appearing, cachectic  ENT: No gross hearing impairment, dry mucous membranes, no thrush  Neck: Supple, No JVD  Lungs: Diminished to bases on auscultation bilaterally, good air entry, non-labored breathing  Cardio: tachycardic, S1/S2, No murmur  Abdomen: Soft, distended, nontender, abd catheter   Extremities: No calf tenderness, No cyanosis, No pitting edema  Psych: Appropriate mood and affect    LABS:                        13.1   16.81 )-----------( 126      ( 08 Jan 2023 06:25 )             39.6     01-10    129  |  95  |  8   ----------------------------<  117  4.1   |  23  |  0.40    Ca    8.5      10 Nick 2023 06:20    TPro  5.6  /  Alb  2.1  /  TBili  2.5  /  DBili  x   /  AST  106  /  ALT  126  /  AlkPhos  245  01-08    PT/INR - ( 08 Jan 2023 06:25 )   PT: 29.1 sec;   INR: 2.49 ratio      POCT Blood Glucose.: 102 mg/dL (10 Nick 2023 05:34)  POCT Blood Glucose.: 119 mg/dL (09 Jan 2023 23:42)  POCT Blood Glucose.: 101 mg/dL (09 Jan 2023 17:33)  POCT Blood Glucose.: 109 mg/dL (09 Jan 2023 11:58)  POCT Blood Glucose.: 119 mg/dL (09 Jan 2023 10:47)    Culture - Urine (collected 06 Jan 2023 20:04)  Source: Clean Catch Clean Catch (Midstream)  Final Report (07 Jan 2023 20:19):    No growth        RADIOLOGY & ADDITIONAL TESTS:    Care Discussed with Consultants/Other Providers:

## 2023-01-11 NOTE — PROGRESS NOTE ADULT - SUBJECTIVE AND OBJECTIVE BOX
Patient is a 37y old  Male who presents with a chief complaint of hypoglycemia, AMS (10 Nick 2023 17:41)    Patient seen and examined at bedside. Patient reports that he had back/abdominal pain this morning, improved after dilaudid 4 mg PO. Currently he is resting without complaint. Awake and alert. Brother at bedside     ALLERGIES:  No Known Allergies    MEDICATIONS  (STANDING):  ascorbic acid 500 milliGRAM(s) Oral daily  cholecalciferol 1000 Unit(s) Oral daily  dexAMETHasone     Tablet 4 milliGRAM(s) Oral two times a day  enoxaparin Injectable 40 milliGRAM(s) SubCutaneous every 24 hours  furosemide    Tablet 20 milliGRAM(s) Oral daily  lactulose Retention Enema 200 Gram(s) Rectal two times a day  melatonin 6 milliGRAM(s) Oral at bedtime  nystatin    Suspension 323532 Unit(s) Oral four times a day  pantoprazole    Tablet 40 milliGRAM(s) Oral before breakfast  spironolactone 25 milliGRAM(s) Oral daily  zinc sulfate 220 milliGRAM(s) Oral daily    MEDICATIONS  (PRN):    Vital Signs Last 24 Hrs  T(F): 98.2 (11 Jan 2023 06:24), Max: 98.5 (10 Nick 2023 20:33)  HR: 129 (11 Jan 2023 06:24) (117 - 129)  BP: 138/86 (11 Jan 2023 06:24) (119/81 - 138/86)  RR: 14 (11 Jan 2023 06:24) (14 - 17)  SpO2: 94% (11 Jan 2023 06:24) (93% - 95%)    I&O's Summary  10 Nick 2023 07:01  -  11 Jan 2023 07:00  --------------------------------------------------------  IN: 1850 mL / OUT: 0 mL / NET: 1850 mL    PHYSICAL EXAM:  General: NAD, A/O x 3, chronically ill appearing, cachectic  ENT: No gross hearing impairment, dry mucous membranes, no thrush  Neck: Supple, No JVD  Lungs: Diminished to bases on auscultation bilaterally, good air entry, non-labored breathing  Cardio: tachycardic, S1/S2, No murmur  Abdomen: Soft, distended, nontender, abd catheter   Extremities: No calf tenderness, No cyanosis, No pitting edema  Psych: Appropriate mood and affect    LABS:    01-10    129  |  95  |  8   ----------------------------<  117  4.1   |  23  |  0.40    Ca    8.5      10 Nick 2023 06:20    POCT Blood Glucose.: 94 mg/dL (11 Jan 2023 06:37)  POCT Blood Glucose.: 112 mg/dL (10 Nick 2023 23:45)  POCT Blood Glucose.: 115 mg/dL (10 Nick 2023 12:34)    Culture - Urine (collected 06 Jan 2023 20:04)  Source: Clean Catch Clean Catch (Midstream)  Final Report (07 Jan 2023 20:19):    No growth    RADIOLOGY & ADDITIONAL TESTS:    Care Discussed with Consultants/Other Providers:

## 2023-01-11 NOTE — PROGRESS NOTE ADULT - ASSESSMENT
37M with recently diagnosed metastatic liver cancer to lung about 2 months ago, on home hospice, s/p recent abdominal Pleurx getting drained 3x/week presents with AMS and hypoglycemia. Admitted for metabolic encephalopathy, sepsis, and viral PNA from COVID.     #Metabolic and hepatic encephalopathy - improved   #Metastatic liver cancer  - Encourage PO intake  - GI consulted, c/w lactulose enemas- bid as tolerated   - Oncology consulted, appreciate recs   - Palliative care consulted    - R upper abdominal pleux - patient reports that it is supposed to be drained q3 days, last drained Sunday 550cc, plan to drain today  - Chaplaincy consult placed and seen. Emotional support provided  - Patient with abdominal/back pain this morning, improved with PO dilaudid, will order dilaudid PO PRN    #Hypoglycemia  #Hyponatremia - resolved   - Likely due to poor PO intake and poor liver function  - Continue hypoglycemia protocol prn  - c/w Dexamethasone PO - will increase to q6 hrs   - Switched to D5W + 1/2 NS - will decrease to 100/hr    - Endocrinology consulted, appreciate recs     #Viral PNA 2/2 COVID- resolved   #Acute hypoxic respiratory failure - resolved  - Not a candidate for Remdesivir given transaminitis  - Two episodes exertional hypoxia, repeat CXR appears unchanged  - Tolerating room air  - Continue isolation through 1/13    #Severe protein calorie malnutrition  #Cachexia  - Continue multivitamin  - Supportive care    #Thrush  - c/w Nystatin    #Tachycardia  - Multifactorial including deconditioning and poor PO intake  - Encourage PO  - Continue IVF    #Prophylactic Measure  - DVT ppx: Lovenox    GOC: DNR/I, MOL in chart    Dispo: Awaiting inpatient hospice     1/11: pt with bloody fluid with chest tube drainage.  I agree with transfusion prn.  pt is being tapered off of iv glucose.

## 2023-01-11 NOTE — PROGRESS NOTE ADULT - PROBLEM SELECTOR PLAN 1
Advanced hepatocellular carcinoma with metabolic encephalopathy, with a right abdominal Pleurx catheter drained every 3 days.  Patient symptomatic this morning with abdominal/back pain, controlled with Dilaudid.  Patient with recurrent episodes of hypoglycemia.  Evaluated by GI, ID, palliative care and oncology, all consultants concluding towards comfort care.  Patient's brother aware of prognosis and in agreement with above.  Pending reevaluation for home hospice vs hospice inn.

## 2023-01-11 NOTE — CHART NOTE - NSCHARTNOTEFT_GEN_A_CORE
Chart reviewed  Hospice is goal  No additional ID w/u planned  We will not actively follow, please call if ID issues arise

## 2023-01-11 NOTE — PROGRESS NOTE ADULT - ASSESSMENT
37M with recently diagnosed metastatic liver cancer to lung about 2 months ago, on home hospice, s/p recent abdominal Pleurx getting drained 3x/week presents with AMS and hypoglycemia. Admitted for metabolic encephalopathy, sepsis, and viral PNA from COVID.     #Metabolic and hepatic encephalopathy - improved   #Metastatic liver cancer  - Encourage PO intake  - GI consulted, c/w lactulose enemas- bid as tolerated   - Oncology consulted, appreciate recs   - Palliative care consulted    - R upper abdominal pleux - patient reports that it is supposed to be drained q3 days, last drained Sunday 550cc   - Chaplaincy consult placed and seen. Emotional support provided  - Patient with abdominal/back pain this morning, improved with PO dilaudid, will order dilaudid PO PRN    #Hypoglycemia  #Hyponatremia  - Likely due to poor PO intake and poor liver function  - Continue hypoglycemia protocol prn  - c/w Dexamethasone PO  - Switched to D5W + 1/2 NS - will increase to 125/hr    - Endocrinology consulted, appreciate recs   - f/u AM BMP    #Viral PNA 2/2 COVID- resolved   #Acute hypoxic respiratory failure - resolved  - Not a candidate for Remdesivir given transaminitis  - Two episodes exertional hypoxia, repeat CXR appears unchanged  - Tolerating room air  - Continue isolation through 1/13    #Severe protein calorie malnutrition  #Cachexia  - Continue multivitamin  - Supportive care    #Thrush  - c/w Nystatin    #Tachycardia  - Multifactorial including deconditioning and poor PO intake  - Encourage PO  - Continue IVF    #Prophylactic Measure  - DVT ppx: Lovenox    GOC: DNR/I, SHRUTI in chart    Dispo: Awaiting inpatient hospice     1/11: Updated brother Yumikocee at bedside        37M with recently diagnosed metastatic liver cancer to lung about 2 months ago, on home hospice, s/p recent abdominal Pleurx getting drained 3x/week presents with AMS and hypoglycemia. Admitted for metabolic encephalopathy, sepsis, and viral PNA from COVID.     #Metabolic and hepatic encephalopathy - improved   #Metastatic liver cancer  - Encourage PO intake  - GI consulted, c/w lactulose enemas- bid as tolerated   - Oncology consulted, appreciate recs   - Palliative care consulted    - R upper abdominal pleux - patient reports that it is supposed to be drained q3 days, last drained Sunday 550cc, plan to drain today  - Chaplaincy consult placed and seen. Emotional support provided  - Patient with abdominal/back pain this morning, improved with PO dilaudid, will order dilaudid PO PRN    #Hypoglycemia  #Hyponatremia - resolved   - Likely due to poor PO intake and poor liver function  - Continue hypoglycemia protocol prn  - c/w Dexamethasone PO - will increase to q6 hrs   - Switched to D5W + 1/2 NS - will decrease to 100/hr    - Endocrinology consulted, appreciate recs     #Viral PNA 2/2 COVID- resolved   #Acute hypoxic respiratory failure - resolved  - Not a candidate for Remdesivir given transaminitis  - Two episodes exertional hypoxia, repeat CXR appears unchanged  - Tolerating room air  - Continue isolation through 1/13    #Severe protein calorie malnutrition  #Cachexia  - Continue multivitamin  - Supportive care    #Thrush  - c/w Nystatin    #Tachycardia  - Multifactorial including deconditioning and poor PO intake  - Encourage PO  - Continue IVF    #Prophylactic Measure  - DVT ppx: Lovenox    GOC: DNR/I, SHRUTI in chart    Dispo: Awaiting inpatient hospice     1/11: Updated brother Yumikocee at bedside

## 2023-01-11 NOTE — PROGRESS NOTE ADULT - SUBJECTIVE AND OBJECTIVE BOX
Progress: less obtunded    Present Symptoms:   Dyspnea: n  Nausea/Vomiting: n  Anxiety:  n  Depressed Mood: y  Fatigue: y  Loss of appetite: y  Pain:   no                location:   Review of Systems: Unable to obtain due to poor mentation]    MEDICATIONS  (STANDING):  ascorbic acid 500 milliGRAM(s) Oral daily  cholecalciferol 1000 Unit(s) Oral daily  dexAMETHasone     Tablet 4 milliGRAM(s) Oral every 6 hours  dextrose 5% + sodium chloride 0.45%. 1000 milliLiter(s) (100 mL/Hr) IV Continuous <Continuous>  dextrose 50% Injectable 25 Gram(s) IV Push once  dextrose 50% Injectable 12.5 Gram(s) IV Push once  dextrose 50% Injectable 25 Gram(s) IV Push once  dextrose Oral Gel 15 Gram(s) Oral once  furosemide    Tablet 20 milliGRAM(s) Oral daily  glucagon  Injectable 1 milliGRAM(s) IntraMuscular once  lactulose Retention Enema 200 Gram(s) Rectal two times a day  melatonin 6 milliGRAM(s) Oral at bedtime  nystatin    Suspension 069756 Unit(s) Oral four times a day  pantoprazole    Tablet 40 milliGRAM(s) Oral before breakfast  spironolactone 25 milliGRAM(s) Oral daily  zinc sulfate 220 milliGRAM(s) Oral daily    MEDICATIONS  (PRN):  HYDROmorphone   Tablet 4 milliGRAM(s) Oral every 6 hours PRN Severe Pain (7 - 10)      PHYSICAL EXAM:  Vital Signs Last 24 Hrs  T(C): 36.8 (11 Jan 2023 06:24), Max: 36.9 (10 Nick 2023 20:33)  T(F): 98.2 (11 Jan 2023 06:24), Max: 98.5 (10 Nick 2023 20:33)  HR: 129 (11 Jan 2023 06:24) (117 - 129)  BP: 138/86 (11 Jan 2023 06:24) (119/81 - 138/86)  BP(mean): --  RR: 14 (11 Jan 2023 06:24) (14 - 17)  SpO2: 94% (11 Jan 2023 06:24) (93% - 95%)    Parameters below as of 11 Jan 2023 06:24  Patient On (Oxygen Delivery Method): room air      General: alert  oriented x _3___      HEENT: normal    Lungs: comfortable   CV: normal    GI: normal    : normal    Musculoskeletal: normal     Neuro: no deficits   Oral intake ability:  oral feeding      LABS:      01-11    133<L>  |  98  |  10  ----------------------------<  118<H>  4.0   |  25  |  0.51    Ca    8.4      11 Jan 2023 08:47          RADIOLOGY & ADDITIONAL STUDIES:    ADVANCE DIRECTIVES:  Advanced Care Planning discussion total time spent:

## 2023-01-11 NOTE — PROGRESS NOTE ADULT - SUBJECTIVE AND OBJECTIVE BOX
NARINDER LAKHANI, 37y Male  MRN: [id number]  ATTENDING: Rosemarie Barr    HPI:  37M, Mosotho speaking only, reportedly diagnosed with metastatic hepatoma in October 2022 at [facility], admitted to home hospice, brought in by family to the ED at [place] with altered mentation and hypoglycemia.  Patient has been clinically deteriorating, has a Pleurx catheter in place and has therapeutic pleural fluid removal 3 times a week.  On arrival to the emergency room with abdominal pain patient had a CT abdomen did show hepatosplenomegaly, innumerable masses visualized within the lung parenchyma measuring up to 2.5 cm in size consistent with metastatic disease, innumerable masses in the hepatic parenchyma measuring up to 7.5 cm consistent with hepatic malignancy, portal hypertension, a small volume of ascites, L4 metastatic deposit.  CT head showed no acute pathology.  Evaluated by palliative care team in [place] deemed appropriate for hospice.  Patient has never been treated with systemic therapy.  Hematologic picture consistent with coagulopathy, secondary to hypersplenism/thrombocytopenia, elevated LFTs.  In the emergency room he was found encephalopathic, hypoglycemic, acute COVID +.  Medical oncology consulted to discuss prognosis.    MEDICATIONS:  ascorbic acid 500 milliGRAM(s) Oral daily  cholecalciferol 1000 Unit(s) Oral daily  dexAMETHasone     Tablet 2 milliGRAM(s) Oral two times a day  dextrose 50% Injectable 25 Gram(s) IV Push once  dextrose 50% Injectable 12.5 Gram(s) IV Push once  dextrose 50% Injectable 25 Gram(s) IV Push once  dextrose Oral Gel 15 Gram(s) Oral once  enoxaparin Injectable 40 milliGRAM(s) SubCutaneous every 24 hours  furosemide    Tablet 20 milliGRAM(s) Oral daily  glucagon  Injectable 1 milliGRAM(s) IntraMuscular once  HYDROmorphone   Tablet 4 milliGRAM(s) Oral every 4 hours PRN  lactulose Retention Enema 200 Gram(s) Rectal three times a day  melatonin 6 milliGRAM(s) Oral at bedtime  pantoprazole    Tablet 40 milliGRAM(s) Oral before breakfast  piperacillin/tazobactam IVPB.. 3.375 Gram(s) IV Intermittent every 8 hours  sodium chloride 0.9% Bolus 500 milliLiter(s) IV Bolus once  spironolactone 25 milliGRAM(s) Oral daily  zinc sulfate 220 milliGRAM(s) Oral daily    All other medications reviewed.    SUBJECTIVE:  Complained of back pain this a.m.  Pain under better control.    VITALS:  T(C): 36.6 (01-04-23 @ 16:42), Max: 36.6 (01-04-23 @ 16:42)  T(F): 97.9 (01-04-23 @ 16:42), Max: 97.9 (01-04-23 @ 16:42)  HR: 105 (01-05-23 @ 05:10) (105 - 123)  BP: 126/89 (01-05-23 @ 05:10) (118/81 - 129/89)    PHYSICAL EXAM:  Constitutional: alert,cachectic  HEENT: normocephalic, anicteric sclerae, no mucositis or thrush  Respiratory: bilateral clear to auscultation anteriorly  Cardiovascular : S1, S2 regular, rhythmic, no murmurs, gallops or rubs  Abdomen: soft, distended, + normoactive BS, no palpable HS- megaly  Extremities: no tenderness;  -c/c/e, pulses equal bilaterally    LABS:             13.1   16.81 )-----------( 126      ( 08 Jan 2023 06:25 )             39.6     01-08    132<L>  |  98  |  15  ----------------------------<  241<H>  3.8   |  25  |  0.54    Ca    8.8      08 Jan 2023 06:25    TPro  5.6<L>  /  Alb  2.1<L>  /  TBili  2.5<H>  /  DBili  x   /  AST  106<H>  /  ALT  126<H>  /  AlkPhos  245<H>  01-08    (01-04) WBC: 12.84 K/uL,Hemoglobin: 13.1 g/dL, Hematocrit: 40.7 %,  Platelet: 139 K/uL  (01-04) Na: 133 mmol/L ; K: 4.0 mmol/L ; BUN: 13 mg/dL ; Cr: 0.40 mg/dL.  PT/INR - ( 03 Jan 2023 05:45 )   PT: 31.7 sec;   INR: 2.71 ratio  PTT - ( 03 Jan 2023 05:45 )  PTT:40.4 sec  Ca    8.2<L>      03 Jan 2023 05:45  TPro  5.6<L>  /  Alb  2.3<L>  /  TBili  2.0<H>  /  DBili  x   /  AST  78<H>  /  ALT  78<H>  /  AlkPhos  199<H>  01-03  LIVER FUNCTIONS - ( 03 Jan 2023 05:45 )  Alb: 2.3 g/dL / Pro: 5.6 g/dL / ALK PHOS: 199 U/L / ALT: 78 U/L / AST: 78 U/L / GGT: x         Lipase, Serum: 23 U/L (01-03-23 @ 05:45)    RADIOLOGY:  ACC: [id number] EXAM:  CT ABDOMEN AND PELVIS                          PROCEDURE DATE:  01/03/2023      INTERPRETATION:  CLINICAL INFORMATION: Sepsis, abdominal pain. Reported   hepatic malignancy/cirrhosis.    COMPARISON: None.    CONTRAST/COMPLICATIONS:  IV Contrast: NONE  Oral Contrast: NONE  Complications: None reported at time of study completion    PROCEDURE:  CT of the Abdomen and Pelvis was performed.  Sagittal and coronal reformats were performed.    FINDINGS:  LOWER CHEST: Innumerable masses are visualized within the lung parenchyma   measuring up to 2.5 cm in size consistent with lung parenchymal   metastatic neoplastic disease.    LIVER: Hepatomegaly. Heterogeneous attenuation of the hepatic parenchyma   identified with innumerable masses within the hepatic parenchyma   measuring up to 7.5 cm consistent with reported hepatic malignancy.  BILE DUCTS: No intrahepatic or extrahepatic biliary ductal dilatation   identified.  GALLBLADDER: No gross gallstones or gallbladder wall thickening   identified.  SPLEEN: Splenomegaly.  PANCREAS: Within normal limits.  ADRENALS: Soft tissue nodularity measuring 1.9 cm is identified within   the right suprarenal region and a right adrenal nodule cannot be   excluded. The left adrenal appears unremarkable.  KIDNEYS/URETERS: No hydronephrosis. No renal calculi. No space-occupying   lesions of the renal parenchyma identified.    BLADDER: Minimally distended, precluding assessment.  REPRODUCTIVE ORGANS: The prostate appears unremarkable.    BOWEL: No evidence for mechanical bowel obstruction. Pancolonic wall   thickening is suggested raising suspicion for colitis. No evidence for   acute appendicitis.  PERITONEUM: Indwelling peritoneal change catheter. A small volume of   ascites isidentified with fluid noted within the right inguinal canal.   Diffuse stranding of the mesentery noted which may be related to the   presence of ascites; a 2.2 cm soft tissue nodule is identified posterior   to the cecum and a mesenteric neoplastic deposit cannot be excluded. No   free intraperitoneal air.  VESSELS: Recanalized paraumbilical vein and perisplenic varices likely   related to portal hypertension.  RETROPERITONEUM/LYMPH NODES: No lymphadenopathy.  ABDOMINAL WALL: Within normal limits.  BONES: A region of circumscribed lucency is identified within the   anterior/inferior aspect of the L4 vertebral body, a metastatic   neoplastic deposit cannot be excluded.    IMPRESSION:  1. Pancolonic wall thickening is suggested raising suspicion for colitis.  2. Innumerable masses are visualized within the lung parenchyma measuring   up to 2.5 cm in size consistent with lung parenchymal metastatic   neoplastic disease.  3. Hepatomegaly. Heterogeneous attenuation of the hepatic parenchyma   identified with innumerable masses within the hepatic parenchyma   measuring up to 7.5 cm consistent with reported hepatic malignancy.   Splenomegaly. Portal hypertension.  4. Indwelling peritoneal change catheter. A small volume of ascites is   identified with fluid noted within the right inguinal canal. Diffuse   stranding of the mesentery noted which may be related to the presence of   ascites; a 2.2 cm soft tissue nodule is identified posterior to the cecum   and a mesenteric neoplastic deposit cannot be excluded.  5. A region of circumscribed lucency is identified within the   anterior/inferior aspect of the L4 vertebral body, a metastatic   neoplastic deposit cannot be excluded.

## 2023-01-12 NOTE — PROGRESS NOTE ADULT - SUBJECTIVE AND OBJECTIVE BOX
Progress: more lethargic today.  No significant pain at 1145am.    Present Symptoms:   Dyspnea: n  Nausea/Vomiting: n  Anxiety:  n  Depressed Mood: n  Fatigue:   Loss of appetite: n  Pain:       n            location:   Review of Systems: Unable to obtain due to poor mentation    MEDICATIONS  (STANDING):  ascorbic acid 500 milliGRAM(s) Oral daily  cholecalciferol 1000 Unit(s) Oral daily  dexAMETHasone     Tablet 4 milliGRAM(s) Oral every 6 hours  dextrose 5% + sodium chloride 0.45%. 1000 milliLiter(s) (100 mL/Hr) IV Continuous <Continuous>  dextrose 50% Injectable 25 Gram(s) IV Push once  dextrose 50% Injectable 12.5 Gram(s) IV Push once  dextrose 50% Injectable 25 Gram(s) IV Push once  dextrose Oral Gel 15 Gram(s) Oral once  furosemide    Tablet 20 milliGRAM(s) Oral daily  glucagon  Injectable 1 milliGRAM(s) IntraMuscular once  lactulose Retention Enema 200 Gram(s) Rectal two times a day  melatonin 6 milliGRAM(s) Oral at bedtime  nystatin    Suspension [id number] Unit(s) Oral four times a day  pantoprazole    Tablet 40 milliGRAM(s) Oral before breakfast  spironolactone 25 milliGRAM(s) Oral daily  zinc sulfate 220 milliGRAM(s) Oral daily    MEDICATIONS  (PRN):  HYDROmorphone  Injectable 0.5 milliGRAM(s) IV Push every 4 hours PRN Moderate Pain (4 - 6)  HYDROmorphone  Injectable 1 milliGRAM(s) IV Push every 4 hours PRN Severe Pain (7 - 10)      PHYSICAL EXAM:  Vital Signs Last 24 Hrs  T(C): 37.2 (12 Jan 2023 06:34), Max: 37.2 (12 Jan 2023 05:54)  T(F): 98.9 (12 Jan 2023 06:34), Max: 98.9 (12 Jan 2023 05:54)  HR: 128 (12 Jan 2023 06:34) (114 - 128)  BP: 137/91 (12 Jan 2023 06:34) (125/85 - 137/91)  BP(mean): --  RR: 17 (12 Jan 2023 06:34) (16 - 17)  SpO2: 94% (12 Jan 2023 06:34) (94% - 95%)    Parameters below as of 12 Jan 2023 06:34  Patient On (Oxygen Delivery Method): room air      General: alert lethargic and cachectic  HEENT: normal    Lungs: comfortable   CV: normal    GI: normal    : normal    Musculoskeletal: cachectic  Skin: normal    Neuro: lethargic  Oral intake ability:  oral feeding with assistance      LABS:                          12.8   19.10 )-----------( 140      ( 12 Jan 2023 07:15 )             38.7     01-12    133<L>  |  98  |  13  ----------------------------<  118<H>  4.3   |  22  |  0.56    Ca    8.6      12 Jan 2023 07:15          RADIOLOGY & ADDITIONAL STUDIES:    ADVANCE DIRECTIVES:  Advanced Care Planning discussion total time spent:

## 2023-01-12 NOTE — PROGRESS NOTE ADULT - ASSESSMENT
Palliative:  pt continues to deteriorate and is in patient hospice appropriate.  Awaitng hospice input

## 2023-01-12 NOTE — PROGRESS NOTE ADULT - SUBJECTIVE AND OBJECTIVE BOX
Patient is a 37y old  Male who presents with a chief complaint of hypoglycemia, AMS     Patient seen and examined at bedside. Noted tachycardia. Brother Anne at bedside attempting to feed patient. Patient increasingly encephalopathic, unable to provide any reliable subjective information. Stating he wants food although swatting at food when near his mouth. Pt currently unable to swallow.     ALLERGIES:  No Known Allergies    MEDICATIONS  (STANDING):  ascorbic acid 500 milliGRAM(s) Oral daily  cholecalciferol 1000 Unit(s) Oral daily  dexAMETHasone     Tablet 4 milliGRAM(s) Oral every 6 hours  dextrose 5% + sodium chloride 0.45%. 1000 milliLiter(s) (100 mL/Hr) IV Continuous <Continuous>  dextrose 50% Injectable 25 Gram(s) IV Push once  dextrose 50% Injectable 12.5 Gram(s) IV Push once  dextrose 50% Injectable 25 Gram(s) IV Push once  dextrose Oral Gel 15 Gram(s) Oral once  furosemide    Tablet 20 milliGRAM(s) Oral daily  glucagon  Injectable 1 milliGRAM(s) IntraMuscular once  lactulose Retention Enema 200 Gram(s) Rectal two times a day  melatonin 6 milliGRAM(s) Oral at bedtime  nystatin    Suspension [id number] Unit(s) Oral four times a day  pantoprazole    Tablet 40 milliGRAM(s) Oral before breakfast  spironolactone 25 milliGRAM(s) Oral daily  zinc sulfate 220 milliGRAM(s) Oral daily    MEDICATIONS  (PRN):  HYDROmorphone   Tablet 4 milliGRAM(s) Oral every 6 hours PRN Severe Pain (7 - 10)    Vital Signs Last 24 Hrs  T(F): 98.9 (12 Jan 2023 06:34), Max: 98.9 (12 Jan 2023 05:54)  HR: 128 (12 Jan 2023 06:34) (114 - 128)  BP: 137/91 (12 Jan 2023 06:34) (125/85 - 137/91)  RR: 17 (12 Jan 2023 06:34) (16 - 17)  SpO2: 94% (12 Jan 2023 06:34) (94% - 95%)  I&O's Summary    11 Jan 2023 07:01  -  12 Jan 2023 07:00  --------------------------------------------------------  IN: 900 mL / OUT: 0 mL / NET: 900 mL      PHYSICAL EXAM:  General: Frail, FTT, AOx0  ENT: dry MM, no oral thrush   Neck: Supple, No JVD  Lungs: Clear to auscultation bilaterally, non labored breathing  Cardio: tachycardic, S1/S2, No murmurs  Abdomen: Soft, Nontender, +distended; Bowel sounds present  Extremities: No calf tenderness, 2+ LE pitting edema    LABS:                        12.8   19.10 )-----------( 140      ( 12 Jan 2023 07:15 )             38.7     01-12    133  |  98  |  13  ----------------------------<  118  4.3   |  22  |  0.56    Ca    8.6      12 Jan 2023 07:15              POCT Blood Glucose.: 100 mg/dL (12 Jan 2023 06:01)  POCT Blood Glucose.: 118 mg/dL (12 Jan 2023 00:24)  POCT Blood Glucose.: 129 mg/dL (11 Jan 2023 13:33)          Culture - Urine (collected 06 Jan 2023 20:04)  Source: Clean Catch Clean Catch (Midstream)  Final Report (07 Jan 2023 20:19):    No growth          RADIOLOGY & ADDITIONAL TESTS:    Care Discussed with Consultants/Other Providers:    Patient is a 37y old  Male who presents with a chief complaint of hypoglycemia, AMS     Patient seen and examined at bedside. Noted tachycardia. Brother Medino at bedside attempting to feed patient. Patient increasingly encephalopathic, unable to provide any reliable subjective information. Stating he wants food although swatting at food when near his mouth. Pt currently unable to swallow.     ALLERGIES:  No Known Allergies    MEDICATIONS  (STANDING):  ascorbic acid 500 milliGRAM(s) Oral daily  cholecalciferol 1000 Unit(s) Oral daily  dexAMETHasone     Tablet 4 milliGRAM(s) Oral every 6 hours  dextrose 5% + sodium chloride 0.45%. 1000 milliLiter(s) (100 mL/Hr) IV Continuous <Continuous>  dextrose 50% Injectable 25 Gram(s) IV Push once  dextrose 50% Injectable 12.5 Gram(s) IV Push once  dextrose 50% Injectable 25 Gram(s) IV Push once  dextrose Oral Gel 15 Gram(s) Oral once  furosemide    Tablet 20 milliGRAM(s) Oral daily  glucagon  Injectable 1 milliGRAM(s) IntraMuscular once  lactulose Retention Enema 200 Gram(s) Rectal two times a day  melatonin 6 milliGRAM(s) Oral at bedtime  nystatin    Suspension 527384 Unit(s) Oral four times a day  pantoprazole    Tablet 40 milliGRAM(s) Oral before breakfast  spironolactone 25 milliGRAM(s) Oral daily  zinc sulfate 220 milliGRAM(s) Oral daily    MEDICATIONS  (PRN):  HYDROmorphone   Tablet 4 milliGRAM(s) Oral every 6 hours PRN Severe Pain (7 - 10)    Vital Signs Last 24 Hrs  T(F): 98.9 (12 Jan 2023 06:34), Max: 98.9 (12 Jan 2023 05:54)  HR: 128 (12 Jan 2023 06:34) (114 - 128)  BP: 137/91 (12 Jan 2023 06:34) (125/85 - 137/91)  RR: 17 (12 Jan 2023 06:34) (16 - 17)  SpO2: 94% (12 Jan 2023 06:34) (94% - 95%)  I&O's Summary    11 Jan 2023 07:01  -  12 Jan 2023 07:00  --------------------------------------------------------  IN: 900 mL / OUT: 0 mL / NET: 900 mL      PHYSICAL EXAM:  General: Frail, FTT, AOx0  ENT: dry MM, no oral thrush   Neck: Supple, No JVD  Lungs: Clear to auscultation bilaterally, non labored breathing  Cardio: tachycardic, S1/S2, No murmurs  Abdomen: Soft, Nontender, +distended; Bowel sounds present  Extremities: No calf tenderness, 2+ LE pitting edema    LABS:                        12.8   19.10 )-----------( 140      ( 12 Jan 2023 07:15 )             38.7     01-12    133  |  98  |  13  ----------------------------<  118  4.3   |  22  |  0.56    Ca    8.6      12 Jan 2023 07:15              POCT Blood Glucose.: 100 mg/dL (12 Jan 2023 06:01)  POCT Blood Glucose.: 118 mg/dL (12 Jan 2023 00:24)  POCT Blood Glucose.: 129 mg/dL (11 Jan 2023 13:33)          Culture - Urine (collected 06 Jan 2023 20:04)  Source: Clean Catch Clean Catch (Midstream)  Final Report (07 Jan 2023 20:19):    No growth          RADIOLOGY & ADDITIONAL TESTS:    Care Discussed with Consultants/Other Providers:

## 2023-01-12 NOTE — PROGRESS NOTE ADULT - ASSESSMENT
37M with recently diagnosed metastatic liver cancer to lung about 2 months ago, on home hospice, s/p recent abdominal Pleurx getting drained 3x/week presents with AMS and hypoglycemia. Admitted for metabolic encephalopathy, sepsis, and viral PNA from COVID.     #Metabolic and hepatic encephalopathy -worsening   #Metastatic liver cancer  - Encourage PO intake  - GI consulted, c/w lactulose enemas- bid as tolerated   - Oncology consulted, appreciate recs   - Palliative care consulted    - R upper abdominal pleux - patient reports that it is supposed to be drained q3 days, last drained Sunday 550cc, plan to drain today  - Chaplaincy consult placed and seen. Emotional support provided  - Patient with abdominal/back pain this morning, improved with PO dilaudid, will order dilaudid PO PRN    #Hypoglycemia  #Hyponatremia - resolved   - Likely due to poor PO intake and poor liver function  - Continue hypoglycemia protocol prn  - c/w Dexamethasone PO - will increase to q6 hrs   - Switched to D5W + 1/2 NS - will decrease to 100/hr    - Endocrinology consulted, appreciate recs     #Viral PNA 2/2 COVID- resolved   #Acute hypoxic respiratory failure - resolved  - Not a candidate for Remdesivir given transaminitis  - Two episodes exertional hypoxia, repeat CXR appears unchanged  - Tolerating room air  - Continue isolation through 1/13    #Severe protein calorie malnutrition  #Cachexia  - Continue multivitamin  - Supportive care    #Thrush  - c/w Nystatin    #Tachycardia  - Multifactorial including deconditioning and poor PO intake  - Encourage PO  - Continue IVF    #Prophylactic Measure  - DVT ppx: Lovenox    GOC: DNR/I, SHRUTI in chart    Dispo: Awaiting inpatient hospice     1/11: Updated brother Yumikocee at bedside        37M with recently diagnosed metastatic liver cancer to lung about 2 months ago, on home hospice, s/p recent abdominal Pleurx getting drained 3x/week presents with AMS and hypoglycemia. Admitted for metabolic encephalopathy, sepsis, and viral PNA from COVID.     #Metabolic and hepatic encephalopathy -worsening   #Metastatic liver cancer  - Encourage PO intake  - GI consulted, c/w lactulose enemas- bid as tolerated   - Oncology consulted, appreciate recs   - Palliative care consulted    - R upper abdominal pleux - patient reports that it is supposed to be drained q3 days, last drained Sunday 550cc,   - Chaplaincy Emotional support provided  -Dilaudid PRN for pain control     #Hypoglycemia  #Hyponatremia   - Likely due to poor PO intake and poor liver function  - Continue hypoglycemia protocol prn  - c/w Dexamethasone PO q6 hrs   - D5W + 1/2 NS - will decrease to 100/hr. monitor for hypervolemia   - Endocrinology consulted, appreciate recs     #Viral PNA 2/2 COVID- resolved   #Acute hypoxic respiratory failure - resolved  - Two episodes exertional hypoxia, repeat CXR appears unchanged  - Tolerating room air  - Continue isolation through 1/13    #Severe protein calorie malnutrition  #Cachexia  - Continue multivitamin  - Supportive care    #Thrush  - c/w Nystatin    #Tachycardia  - Multifactorial including deconditioning and poor PO intake  - Encourage PO.   - Continue IVF    #Prophylactic Measure  - DVT ppx: Lovenox    GOC: DNR/ISHRUTI in chart    Dispo: Awaiting inpatient hospice     1/12: Updated brother Ariana at bedside        37M with recently diagnosed metastatic liver cancer to lung about 2 months ago, on home hospice, s/p recent abdominal Pleurx getting drained 3x/week presents with AMS and hypoglycemia. Admitted for metabolic encephalopathy, sepsis, and viral PNA from COVID.     #Metabolic and hepatic encephalopathy -worsening   #Metastatic liver cancer  - Encourage PO intake  - GI consulted, c/w lactulose enemas- bid as tolerated   - Oncology consulted, appreciate recs   - Palliative care consulted    - R upper abdominal pleux - patient reports that it is supposed to be drained q3 days, last drained Sunday 550cc,   - Chaplaincy Emotional support provided  -Dilaudid PRN for pain control     #Hypoglycemia  #Hyponatremia   - Likely due to poor PO intake and poor liver function  - Continue hypoglycemia protocol prn  - c/w Dexamethasone PO q6 hrs   - IVF changed to D5W + NS,  further decrease to 60ml/hr.   - Endocrinology consulted, appreciate recs     #Viral PNA 2/2 COVID- resolved   #Acute hypoxic respiratory failure - resolved  - Two episodes exertional hypoxia, repeat CXR appears unchanged  - Tolerating room air  - Continue isolation through 1/13    #Severe protein calorie malnutrition  #Cachexia  - Continue multivitamin  - Supportive care    #Thrush  - c/w Nystatin    #Tachycardia  - Multifactorial including deconditioning and poor PO intake  - Encourage PO.   - Continue IVF    #Prophylactic Measure  - DVT ppx: Lovenox    GOC: DNR/ISHRUTI in chart    Dispo: Awaiting inpatient hospice     1/12: Updated brother Ariana at bedside

## 2023-01-12 NOTE — CHART NOTE - NSCHARTNOTEFT_GEN_A_CORE
Nutrition Follow Up Note  Hospital Course   (Per Electronic Medical Record)    Source:  Patient [X]  Family Member [X]   Nursing Staff [X]   Medical Record [X]      Diet: Diet, Regular:   Supplement Feeding Modality:  Oral  Ensure Enlive Cans or Servings Per Day:  1       Frequency:  Three Times a day (01-08-23 @ 08:30) [Active]    Nutrition Follow Up: Patient with severe protein calorie malnutrition. Poor PO intakes. Brother at bedside to assist patient with feeding. Receives vanilla ice cream and applesauce with meals per brothers request. Per pt's brother, pt consuming some of Ensure supplement. Palliative following. DNR/I + no tube feeding.. Will honor patients daily food preferences and continue with Ensure Plus High Protein 8oz PO TID (Provides 1,050kcal & 60grams of Protein).       Enteral/Parenteral Nutrition: Not Applicable    Weight Trends:  114 lb (1/3/23)      Skin: Intact    Edema: +2 @ L/R leg    Last Bowel Movement: 1/11/23    Pertinent Medications: MEDICATIONS  (STANDING):  ascorbic acid 500 milliGRAM(s) Oral daily  cholecalciferol 1000 Unit(s) Oral daily  dexAMETHasone     Tablet 4 milliGRAM(s) Oral every 6 hours  dextrose 5% + sodium chloride 0.9%. 1000 milliLiter(s) (60 mL/Hr) IV Continuous <Continuous>  dextrose 50% Injectable 25 Gram(s) IV Push once  dextrose 50% Injectable 12.5 Gram(s) IV Push once  dextrose 50% Injectable 25 Gram(s) IV Push once  dextrose Oral Gel 15 Gram(s) Oral once  furosemide    Tablet 20 milliGRAM(s) Oral daily  glucagon  Injectable 1 milliGRAM(s) IntraMuscular once  lactulose Retention Enema 200 Gram(s) Rectal two times a day  melatonin 6 milliGRAM(s) Oral at bedtime  nystatin    Suspension 144632 Unit(s) Oral four times a day  pantoprazole    Tablet 40 milliGRAM(s) Oral before breakfast  spironolactone 25 milliGRAM(s) Oral daily  zinc sulfate 220 milliGRAM(s) Oral daily    MEDICATIONS  (PRN):  HYDROmorphone  Injectable 0.5 milliGRAM(s) IV Push every 4 hours PRN Moderate Pain (4 - 6)  HYDROmorphone  Injectable 1 milliGRAM(s) IV Push every 4 hours PRN Severe Pain (7 - 10)      Pertinent Labs:  01-12 Na133 mmol/L<L> Glu 118 mg/dL<H> K+ 4.3 mmol/L Cr  0.56 mg/dL BUN 13 mg/dL 01-06 Phos 2.9 mg/dL 01-08 Alb 2.1 g/dL<L>            Estimated Needs:   [X] No Change Since Previous Assessment    Previous Nutrition Diagnosis:   Severe Malnutrition    Nutrition Diagnosis is [X] Ongoing -     Interventions:   1. Honor patients daily food preferences  2. Continue with Ensure Plus High Protein 8oz PO TID (Provides 1,050kcal & 60grams of Protein)     Monitoring & Evaluation:   [X] Weights   [X] PO Intake   [X] Skin Integrity   [X] Follow Up (Per Protocol)  [X] Tolerance to Diet Prescription   [X] Other: Labs & POCT    Registered Dietitian/Nutritionist Remains Available.  Zachery Jaen RD, CDN    Phone# [phone]

## 2023-01-13 NOTE — PROGRESS NOTE ADULT - NUTRITIONAL ASSESSMENT
This patient has been assessed with a concern for Malnutrition and has been determined to have a diagnosis/diagnoses of Severe protein-calorie malnutrition.    This patient is being managed with:   Diet DASH/TLC-  Sodium & Cholesterol Restricted  Entered: Nick  3 2023  7:30AM    
This patient has been assessed with a concern for Malnutrition and has been determined to have a diagnosis/diagnoses of Severe protein-calorie malnutrition.    This patient is being managed with:   Diet Regular-  Supplement Feeding Modality:  Oral  Ensure Enlive Cans or Servings Per Day:  1       Frequency:  Three Times a day  Entered: Jan 8 2023  8:30AM    
This patient has been assessed with a concern for Malnutrition and has been determined to have a diagnosis/diagnoses of Severe protein-calorie malnutrition.    This patient is being managed with:   Diet DASH/TLC-  Sodium & Cholesterol Restricted  Entered: Nick  3 2023  7:30AM    
This patient has been assessed with a concern for Malnutrition and has been determined to have a diagnosis/diagnoses of Severe protein-calorie malnutrition.    This patient is being managed with:   Diet DASH/TLC-  Sodium & Cholesterol Restricted  Entered: Nick  3 2023  7:30AM    
This patient has been assessed with a concern for Malnutrition and has been determined to have a diagnosis/diagnoses of Severe protein-calorie malnutrition.    This patient is being managed with:   Diet Regular-  Supplement Feeding Modality:  Oral  Ensure Enlive Cans or Servings Per Day:  1       Frequency:  Three Times a day  Entered: Jan 8 2023  8:30AM    
This patient has been assessed with a concern for Malnutrition and has been determined to have a diagnosis/diagnoses of Severe protein-calorie malnutrition.    This patient is being managed with:   Diet DASH/TLC-  Sodium & Cholesterol Restricted  Entered: Nick  3 2023  7:30AM

## 2023-01-13 NOTE — PROGRESS NOTE ADULT - CONVERSATION/DISCUSSION
Diagnosis/Prognosis/MOLST Discussed/Treatment Options
Diagnosis/Prognosis/MOLST Discussed/Treatment Options
Diagnosis/Prognosis/Treatment Options/Hospice Referral

## 2023-01-13 NOTE — DISCHARGE NOTE FOR THE EXPIRED PATIENT - HOSPITAL COURSE
37M with recently diagnosed metastatic liver cancer to lung about 2 months ago, on home hospice, s/p recent abdominal Pleurx getting drained 3x/week presents with AMS and hypoglycemia. Admitted for metabolic encephalopathy, sepsis, and viral PNA from COVID. Pt and family did not want to pursure aggressive measures, decision made to make comfort measures only. Patient  on 23 at 2245 with family member, Dayana (brother), at bedside. Attending MD aware.

## 2023-01-13 NOTE — DISCHARGE NOTE NURSING/CASE MANAGEMENT/SOCIAL WORK - NSTRANSFERBELONGINGSDISPO_GEN_A_NUR
How Severe Is Your Growth?: mild Has Your Growth Been Treated?: not been treated Is This A New Presentation, Or A Follow-Up?: Subcutaneous Growth not applicable

## 2023-01-13 NOTE — PROGRESS NOTE ADULT - SUBJECTIVE AND OBJECTIVE BOX
Patient is a 37y old  Male who presents with a chief complaint of hypoglycemia, AMS      ID [id number] used  Brother Dayana at bedside.     Patient seen and examined at bedside. Pt not eating, unable to swallow meds, not able to communicate subjective information.     ALLERGIES:  No Known Allergies    MEDICATIONS  (STANDING):  ascorbic acid 500 milliGRAM(s) Oral daily  cholecalciferol 1000 Unit(s) Oral daily  dexAMETHasone     Tablet 4 milliGRAM(s) Oral every 6 hours  dextrose 5% + sodium chloride 0.9%. 1000 milliLiter(s) (60 mL/Hr) IV Continuous <Continuous>  dextrose 50% Injectable 25 Gram(s) IV Push once  dextrose 50% Injectable 12.5 Gram(s) IV Push once  dextrose 50% Injectable 25 Gram(s) IV Push once  dextrose Oral Gel 15 Gram(s) Oral once  furosemide    Tablet 20 milliGRAM(s) Oral daily  glucagon  Injectable 1 milliGRAM(s) IntraMuscular once  HYDROmorphone  Injectable 0.5 milliGRAM(s) IV Push every 6 hours  lactulose Retention Enema 200 Gram(s) Rectal two times a day  melatonin 6 milliGRAM(s) Oral at bedtime  nystatin    Suspension [id number] Unit(s) Oral four times a day  pantoprazole    Tablet 40 milliGRAM(s) Oral before breakfast  scopolamine 1 mG/72 Hr(s) Patch 1 Patch Transdermal every 72 hours  spironolactone 25 milliGRAM(s) Oral daily  zinc sulfate 220 milliGRAM(s) Oral daily    MEDICATIONS  (PRN):  HYDROmorphone  Injectable 1 milliGRAM(s) IV Push every 4 hours PRN Severe Pain (7 - 10)  LORazepam   Injectable 1 milliGRAM(s) IV Push every 4 hours PRN restlessness    Vital Signs Last 24 Hrs  T(F): 97.1 (13 Jan 2023 05:29), Max: 98 (12 Jan 2023 21:43)  HR: 120 (13 Jan 2023 05:29) (120 - 120)  BP: 152/88 (13 Jan 2023 05:29) (140/82 - 153/75)  RR: 18 (13 Jan 2023 05:29) (17 - 18)  SpO2: 94% (13 Jan 2023 05:29) (94% - 97%)  I&O's Summary    12 Jan 2023 07:01  -  13 Jan 2023 07:00  --------------------------------------------------------  IN: 720 mL / OUT: 0 mL / NET: 720 mL    13 Jan 2023 07:01  -  13 Jan 2023 15:22  --------------------------------------------------------  IN: 0 mL / OUT: 180 mL / NET: -180 mL      PHYSICAL EXAM:  General: Frail, FTT, AOx0  ENT: dry MM, no oral thrush   Neck: Supple, No JVD  Lungs: crackles to bases bilaterally, non labored breathing  Cardio: tachycardic, S1/S2, No murmurs  Abdomen: Soft, Nontender, +distended; Bowel sounds present  Extremities: No calf tenderness, 2+ LE pitting edema    LABS:                        12.8   19.10 )-----------( 140      ( 12 Jan 2023 07:15 )             38.7     01-12    133  |  98  |  13  ----------------------------<  118  4.3   |  22  |  0.56    Ca    8.6      12 Jan 2023 07:15                  POCT Blood Glucose.: 106 mg/dL (13 Jan 2023 05:45)  POCT Blood Glucose.: 117 mg/dL (12 Jan 2023 23:46)  POCT Blood Glucose.: 112 mg/dL (12 Jan 2023 17:27)          Culture - Urine (collected 06 Jan 2023 20:04)  Source: Clean Catch Clean Catch (Midstream)  Final Report (07 Jan 2023 20:19):    No growth          RADIOLOGY & ADDITIONAL TESTS:    Care Discussed with Consultants/Other Providers:   Palliative Dr. Ingram

## 2023-01-13 NOTE — PROGRESS NOTE ADULT - REASON FOR ADMISSION
hypoglycemia, AMS

## 2023-01-13 NOTE — PROGRESS NOTE ADULT - PROVIDER SPECIALTY LIST ADULT
Hospitalist
Palliative Care
Hospitalist
Infectious Disease
Palliative Care
Palliative Care
Heme/Onc
Hospitalist
Palliative Care
Heme/Onc
Palliative Care
Palliative Care
Heme/Onc
Hospitalist
Palliative Care
Heme/Onc
Heme/Onc
Gastroenterology

## 2023-01-13 NOTE — PROGRESS NOTE ADULT - NS ATTEND AMEND GEN_ALL_CORE FT
36 y/o M with recently diagnosed metastatic liver cancer to lung 2 months ago, on home hospice, s/p recent abdominal Pleurx getting drained 3x/week presents with AMS and hypoglycemia. Admitted for metabolic encephalopathy, sepsis, and viral PNA from COVID. Metabolic encephalopathy continues to progress - patient unable to tolerate PO, increased confusion, lethargy. awaiting hospice eval. prognosis remains poor.
seen earlier today  wife at bedside  feeding him breakfast  hypoglycemic early AM - given dextrose  endocrine consulted  started D5  GOC discussion  [facility] [id number] Case  patient was informed about terminal diagnosis due to metastatic liver cancer  pt agreeable to continue current care but wishes DNR DNI  MOLST filled - witnessed by wife and SANGEETA Quispe.
wife at bedside   [facility] number [id number]  pt feels overall improved but chronically ill with poor appetite  hypoxia - oxygen dependent  monitor for hypoglycemia  Failure to thrive  Is/Os  complex dc planning  undocumented from [facility]
frail  distended but soft abdomen  cachectic    increase steroid dose  lower D5  monitor glucose    await inpt hospice
another [facility] conversation had with patient and brother at bedside.  Dr. Ingram present  [place] no. [id number]  spent 18 mins on advanced care planning  brother was informed that home discharge unlikely given recurrent hypoglycemia  he requested continuation of D5W to allow time for family members from [facility] to visit.  terminally ill  +ascites with distended abdomen - 500cc drained 1/8
38 y/o M with recently diagnosed metastatic liver cancer to lung 2 months ago, on home hospice, s/p recent abdominal Pleurx getting drained 3x/week presents with AMS and hypoglycemia. Admitted for metabolic encephalopathy, sepsis, and viral PNA from COVID. Metabolic encephalopathy worsens, patient unable to tolerate PO, increased confusion, lethargy. more uncomfortable today with noted tachycardia. change prn dilaudid to standing, with additional prn as indicated. prognosis remains poor.
Patient seen and examined at the bedside. Agree with the assessment and plan of NP Anthony.  Metastatic HCC. Poor prognosis. Agree with management as above, plan for home hospice.

## 2023-01-13 NOTE — PROGRESS NOTE ADULT - NS ATTEND OPT1 GEN_ALL_CORE
I attest my time as attending is greater than 50% of the total combined time spent on qualifying patient care activities by the PA/NP and attending.
I independently performed the documented:
I attest my time as attending is greater than 50% of the total combined time spent on qualifying patient care activities by the PA/NP and attending.
I attest my time as attending is greater than 50% of the total combined time spent on qualifying patient care activities by the PA/NP and attending.

## 2023-01-13 NOTE — DISCHARGE NOTE NURSING/CASE MANAGEMENT/SOCIAL WORK - PATIENT PORTAL LINK FT
You can access the FollowMyHealth Patient Portal offered by [facility] by registering at the following website: [email or web address]. By joining [facility]’s FollowMyHealth portal, you will also be able to view your health information using other applications (apps) compatible with our system.

## 2023-01-13 NOTE — DISCHARGE NOTE NURSING/CASE MANAGEMENT/SOCIAL WORK - NSDCPEFALRISK_GEN_ALL_CORE
For information on Fall & Injury Prevention, visit: [email or web address] OR  [email or web address] OR  https://www.cdc.gov/steadi/patient.html

## 2023-01-19 NOTE — CDI QUERY NOTE - NSCDIOTHERTXTBX_GEN_ALL_CORE_HH
Presents with acute metabolic encephalopathy, hypoglycemia, covid +.   H/O metastatic liver cancer.    1/4/2023 note - Sepsis ruled out, Leukocytosis likely in setting of chronic steroid use.   -Will continue IV Zosyn for now pending blood cultures x2.     Zosyn discontinued on 1/5/2023.    Succeeding notes - Admitted for metabolic encephalopathy, sepsis, and viral PNA from COVID.     Expiration note - Admitted for metabolic encephalopathy, sepsis, and viral PNA from COVID.    Conflicting notes regarding Sepsis.    Please clarify    - Sepsis resolved  - Sepsis ruled out  - Other(specify)      Thank you.

## 2023-02-03 LAB — GLUCOSE BLDC GLUCOMTR-MCNC: 95 MG/DL — SIGNIFICANT CHANGE UP (ref 70–99)
